# Patient Record
Sex: MALE | Race: WHITE | Employment: OTHER | URBAN - METROPOLITAN AREA
[De-identification: names, ages, dates, MRNs, and addresses within clinical notes are randomized per-mention and may not be internally consistent; named-entity substitution may affect disease eponyms.]

---

## 2017-01-18 ENCOUNTER — PATIENT OUTREACH (OUTPATIENT)
Dept: FAMILY MEDICINE CLINIC | Age: 77
End: 2017-01-18

## 2017-01-18 NOTE — PROGRESS NOTES
Patient is greater than 30 days post episode without further incidents. Patient has attended follow up appointment as scheduled with Provider. Will close episode and follow as needed.

## 2017-01-30 DIAGNOSIS — I65.09 VERTEBRAL ARTERY OCCLUSION, UNSPECIFIED LATERALITY: ICD-10-CM

## 2017-01-30 DIAGNOSIS — R76.8 RHEUMATOID FACTOR POSITIVE: ICD-10-CM

## 2017-01-30 DIAGNOSIS — E66.3 OVERWEIGHT: ICD-10-CM

## 2017-01-30 DIAGNOSIS — Z12.5 SCREENING FOR PROSTATE CANCER: ICD-10-CM

## 2017-01-30 DIAGNOSIS — D49.4 BLADDER TUMOR: ICD-10-CM

## 2017-01-30 RX ORDER — AMLODIPINE AND VALSARTAN 10; 320 MG/1; MG/1
TABLET ORAL
Qty: 30 TAB | Refills: 2 | Status: SHIPPED | OUTPATIENT
Start: 2017-01-30 | End: 2017-05-30 | Stop reason: SDUPTHER

## 2017-03-21 ENCOUNTER — TELEPHONE (OUTPATIENT)
Dept: NEUROLOGY | Age: 77
End: 2017-03-21

## 2017-03-21 NOTE — TELEPHONE ENCOUNTER
Dr. Joanna Stevenson office is going to fax over a paper asking if the pt can hold some of his medications. Once the fax is received they would like a phone call back.

## 2017-03-22 NOTE — TELEPHONE ENCOUNTER
Spoke with [facility], informed returned Faxed patient form and MD note 12/19/16.

## 2017-04-10 ENCOUNTER — HOSPITAL ENCOUNTER (OUTPATIENT)
Dept: CT IMAGING | Age: 77
Discharge: HOME | End: 2017-04-10
Attending: NURSE PRACTITIONER
Payer: MEDICARE

## 2017-04-10 DIAGNOSIS — I65.01 STENOSIS OF RIGHT VERTEBRAL ARTERY: ICD-10-CM

## 2017-04-10 LAB — CREAT BLD-MCNC: 1 MG/DL (ref 0.6–1.3)

## 2017-04-10 PROCEDURE — 70498 CT ANGIOGRAPHY NECK: CPT

## 2017-04-10 PROCEDURE — 74011636320 HC RX REV CODE- 636/320: Performed by: NURSE PRACTITIONER

## 2017-04-10 PROCEDURE — 74011000258 HC RX REV CODE- 258: Performed by: NURSE PRACTITIONER

## 2017-04-10 PROCEDURE — 82565 ASSAY OF CREATININE: CPT

## 2017-04-10 RX ORDER — SODIUM CHLORIDE 0.9 % (FLUSH) 0.9 %
10 SYRINGE (ML) INJECTION
Status: COMPLETED | OUTPATIENT
Start: 2017-04-10 | End: 2017-04-10

## 2017-04-10 RX ADMIN — SODIUM CHLORIDE 100 ML: 900 INJECTION, SOLUTION INTRAVENOUS at 12:37

## 2017-04-10 RX ADMIN — IOPAMIDOL 100 ML: 755 INJECTION, SOLUTION INTRAVENOUS at 12:30

## 2017-04-10 RX ADMIN — Medication 10 ML: at 12:37

## 2017-04-12 ENCOUNTER — TELEPHONE (OUTPATIENT)
Dept: NEUROLOGY | Age: 77
End: 2017-04-12

## 2017-04-12 NOTE — TELEPHONE ENCOUNTER
Gris patel/ Dr. Darshan Titus office [facility] checking status for clearance for pt.   Test was done at [facility] on Monday

## 2017-04-14 NOTE — TELEPHONE ENCOUNTER
Spoke with Carisa Fountain with [facility], informed of MD recommendation and clearance for surgery, with precautions of risk for stroke or TIA if aspirin or Plavix discontinued. Patient will have to resume antiplatelets once cleared by urology. Fax # for MD noted to Urology, [phone].

## 2017-04-21 ENCOUNTER — TELEPHONE (OUTPATIENT)
Dept: FAMILY MEDICINE CLINIC | Age: 77
End: 2017-04-21

## 2017-04-21 NOTE — TELEPHONE ENCOUNTER
Radha Richardson from [facility] is calling, Radha Richardson states that the patient was sent in two medications (prilosec and plavix) and they came back as two drugs that interact with each other. Radha Richardson wants to make sure that NP Alyssa Maddox wants to proceed with filling these medications. Naima Chaidez of NP [facility] absence until Monday and she states that this can wait until then.      Best call back # for Jonathan:[phone]

## 2017-04-23 DIAGNOSIS — K21.9 GASTROESOPHAGEAL REFLUX DISEASE WITHOUT ESOPHAGITIS: Primary | ICD-10-CM

## 2017-04-23 RX ORDER — LANSOPRAZOLE 30 MG/1
30 CAPSULE, DELAYED RELEASE ORAL
Qty: 30 CAP | Refills: 5 | Status: SHIPPED | OUTPATIENT
Start: 2017-04-23 | End: 2017-09-09 | Stop reason: SDUPTHER

## 2017-04-27 ENCOUNTER — TELEPHONE (OUTPATIENT)
Dept: NEUROLOGY | Age: 77
End: 2017-04-27

## 2017-05-09 ENCOUNTER — OFFICE VISIT (OUTPATIENT)
Dept: NEUROLOGY | Age: 77
End: 2017-05-09

## 2017-05-09 VITALS
DIASTOLIC BLOOD PRESSURE: 60 MMHG | OXYGEN SATURATION: 97 % | SYSTOLIC BLOOD PRESSURE: 140 MMHG | HEART RATE: 76 BPM | RESPIRATION RATE: 18 BRPM

## 2017-05-09 DIAGNOSIS — I65.02 OCCLUSION OF LEFT VERTEBRAL ARTERY: Primary | ICD-10-CM

## 2017-05-09 DIAGNOSIS — I69.30 LATE EFFECT OF STROKE: ICD-10-CM

## 2017-05-09 NOTE — PATIENT INSTRUCTIONS
[facility]   Statement to Patients  April 1, 2014      In an effort to ensure the large volume of patient prescription refills is processed in the most efficient and expeditious manner, we are asking our patients to assist us by calling your Pharmacy for all prescription refills, this will include also your  Mail Order Pharmacy. The pharmacy will contact our office electronically to continue the refill process. Please do not wait until the last minute to call your pharmacy. We need at least 48 hours (2days) to fill prescriptions. We also encourage you to call your pharmacy before going to  your prescription to make sure it is ready. With regard to controlled substance prescription refill requests (narcotic refills) that need to be picked up at our office, we ask your cooperation by providing us with at least 72 hours (3days) notice that you will need a refill. We will not refill narcotic prescription refill requests after 4:00pm on any weekday, Monday through Thursday, or after 2:00pm on Fridays, or on the weekends. We encourage everyone to explore another way of getting your prescription refill request processed using [employer], our patient web portal through our electronic medical record system. [employer] is an efficient and effective way to communicate your medication request directly to the office and  downloadable as an cooper on your smart phone . [employer] also features a review functionality that allows you to view your medication list as well as leave messages for your physician. Are you ready to get connected? If so please review the attatched instructions or speak to any of our staff to get you set up right away! Thank you so much for your cooperation. Should you have any questions please contact our Practice Administrator.     The Physicians and Staff,  [facility]     Thank you for choosing [facility] for your     care. You may receive a survey about your visit. We appreciate you taking time     to complete this survey as we use your feedback to improve our services. We     realize we are not perfect, but we strive to provide excellent care.

## 2017-05-09 NOTE — LETTER
5/9/2017 Patient:  Judah Evangelista YOB: 1940 Date of Visit: 5/9/2017 Dear Paramjit Spence MD 
[address] [facility] [address] [id number] VIA In Basket Natividad Ramirez NP 
[address] [phone] VIA In Basket : 
 
 
I was requested by Natividad Ramirez NP to evaluate Mr. Harrison Blackmon  for Chief Complaint Patient presents with  Neurologic Problem  
  follow up stroke Harles Old I am recommending the following:  
 
Nadir Otero was seen today for neurologic problem. Diagnoses and all orders for this visit: 
 
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 
 
 
 
---------------------------------------------------------------------------------------------------------------------- Below is my encounter: Chief Complaint Patient presents with  Neurologic Problem  
  follow up stroke MARIA T Blackmon is a 65-year-old gentleman whom I evaluated in October 2016 when he suffered an acute right cerebellar infarct. Evaluation also revealing a left vertebral artery occlusion. Repeat CTA in April 2017 showed no interval change. He is currently seeing urology for what sounds like a workup for possible bladder cancer. He is currently taking aspirin and Plavix for stroke prevention and heart attack prevention. Currently, his only complaints are fatigue and some wobbly gait in the morning. Review of Systems Constitutional: Positive for malaise/fatigue. All other systems reviewed and are negative. Past Medical History:  
Diagnosis Date  Calculus of kidney  Cerebral artery occlusion with cerebral infarction ([facility] [address]  Elevated cholesterol 5/26/2010  Fracture of right ankle 5/26/2010  
 HBP (high blood pressure) 5/26/2010  
 NIDDM (non-insulin dependent diabetes mellitus) 5/26/2010  Primary urethral papillary carcinoma ([facility] [address] 2014 Dr Gabriel Jimenez  Skin cancer of face  Ureteral calculus 5/26/2010 History reviewed. No pertinent family history. Social History Social History  Marital status:  Spouse name: N/A  
 Number of children: N/A  
 Years of education: N/A Occupational History  Not on file. Social History Main Topics  Smoking status: Former Smoker Packs/day: 1.00 Years: 30.00 Quit date: 5/27/1992  Smokeless tobacco: Never Used  Alcohol use No  
 Drug use: No  
 Sexual activity: Yes  
  Partners: Female Other Topics Concern  Not on file Social History Narrative Allergies Allergen Reactions  Benicar [Olmesartan] Unable to [employer]  Pcn [Penicillins] Unable to [employer]  Simvastatin Myalgia  Uniretic [Moexipril-Hydrochlorothiazide] Other (comments) GI upset Current Outpatient Prescriptions Medication Sig  
 glimepiride (AMARYL) 4 mg tablet TAKE 1 TAB BY MOUTH TWO (2) TIMES A DAY.  lansoprazole (PREVACID) 30 mg capsule Take 1 Cap by mouth Daily (before breakfast).  metFORMIN (GLUCOPHAGE) 1,000 mg tablet TAKE 1 TABLET TWICE A DAY WITH MEALS  clopidogrel (PLAVIX) 75 mg tab Take 1 Tab by mouth daily.  amLODIPine-valsartan (EXFORGE)  mg per tablet TAKE 1 TABLET BY MOUTH DAILY  ondansetron (ZOFRAN ODT) 4 mg disintegrating tablet Take 1 Tab by mouth every eight (8) hours as needed for Nausea.  aspirin delayed-release 81 mg tablet Take 1 Tab by mouth daily.  clotrimazole-betamethasone (LOTRISONE) topical cream APPLY TO AFFECTED AREA TWO (2) TIMES A DAY. (Patient taking differently: APPLY TO AFFECTED AREA TWO (2) TIMES A DAY. Applies to fingers)  colesevelam (WELCHOL) 625 mg tablet TAKE 3 TABS BY MOUTH TWO (2) TIMES DAILY (WITH MEALS).  diclofenac (VOLTAREN) 1 % topical gel Apply 4 g to affected area four (4) times daily. No current facility-administered medications for this visit. Neurologic Exam  
 
Mental Status WD/WN adult in NAD, normal grooming VSS 
A&O x 3 
 
 PERRL, nonicteric Face is asymmetric, tongue midline Speech is fluent and clear No limb ataxia. No abnl movements. 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 He is a stroke risk. He will need to remain on aspirin and Plavix from here on out. We discussed his urology workup. I have seen the request for medical clearance. I have no restriction on his surgery from a neurologic perspective. However, will need to defer to urology when is the appropriate time for him to discontinue his antiplatelet therapy since I am not familiar with the procedure being done. I discussed with the patient that there are risks with briefly discontinuing antiplatelet therapy such as slightly increased risk for stroke. He will need to discuss with his urologist the risks versus benefits of discontinuing antiplatelet therapy recently for his procedure. If he does pursue the procedure he will need to resume antiplatelets once cleared by urology postop. I would like to see him in 6 months as needed. Thank you for giving me the opportunity to assist in the care of Mr. Ike Simpson. If you have questions, please do not hesitate to contact me. Sincerely, [address] [facility], DO Neurologist 
Diplomate ASHLEY

## 2017-05-09 NOTE — PROGRESS NOTES
Chief Complaint   Patient presents with    Neurologic Problem     follow up stroke       HPI    Lisseth Atwood is a 65-year-old gentleman whom I evaluated in October 2016 when he suffered an acute right cerebellar infarct. Evaluation also revealing a left vertebral artery occlusion. Repeat CTA in April 2017 showed no interval change. He is currently seeing urology for what sounds like a workup for possible bladder cancer. He is currently taking aspirin and Plavix for stroke prevention and heart attack prevention. Currently, his only complaints are fatigue and some wobbly gait in the morning. Review of Systems   Constitutional: Positive for malaise/fatigue. All other systems reviewed and are negative. Past Medical History:   Diagnosis Date    Calculus of kidney     Cerebral artery occlusion with cerebral infarction ([facility] [address]     Elevated cholesterol 5/26/2010    Fracture of right ankle 5/26/2010    HBP (high blood pressure) 5/26/2010    NIDDM (non-insulin dependent diabetes mellitus) 5/26/2010    [facility]ral papillary [facility]) 2014    Dr Carla Dooley Skin cancer of face     Ureteral calculus 5/26/2010     History reviewed. No pertinent family history. Social History     Social History    Marital status:      Spouse name: N/A    Number of children: N/A    Years of education: N/A     Occupational History    Not on file.      Social History Main Topics    Smoking status: Former Smoker     Packs/day: 1.00     Years: 30.00     Quit date: 5/27/1992    Smokeless tobacco: Never Used    Alcohol use No    Drug use: No    Sexual activity: Yes     Partners: Female     Other Topics Concern    Not on file     Social History Narrative     Allergies   Allergen Reactions    Benicar [Olmesartan] Unable to Obtain    Pcn [Penicillins] Unable to Obtain    Simvastatin Myalgia    Uniretic [Moexipril-Hydrochlorothiazide] Other (comments)     GI upset         Current Outpatient Prescriptions Medication Sig    glimepiride (AMARYL) 4 mg tablet TAKE 1 TAB BY MOUTH TWO (2) TIMES A DAY.  lansoprazole (PREVACID) 30 mg capsule Take 1 Cap by mouth Daily (before breakfast).  metFORMIN (GLUCOPHAGE) 1,000 mg tablet TAKE 1 TABLET TWICE A DAY WITH MEALS    clopidogrel (PLAVIX) 75 mg tab Take 1 Tab by mouth daily.  amLODIPine-valsartan (EXFORGE)  mg per tablet TAKE 1 TABLET BY MOUTH DAILY    ondansetron (ZOFRAN ODT) 4 mg disintegrating tablet Take 1 Tab by mouth every eight (8) hours as needed for Nausea.  aspirin delayed-release 81 mg tablet Take 1 Tab by mouth daily.  clotrimazole-betamethasone (LOTRISONE) topical cream APPLY TO AFFECTED AREA TWO (2) TIMES A DAY. (Patient taking differently: APPLY TO AFFECTED AREA TWO (2) TIMES A DAY. Applies to fingers)    colesevelam (WELCHOL) 625 mg tablet TAKE 3 TABS BY MOUTH TWO (2) TIMES DAILY (WITH MEALS).  diclofenac (VOLTAREN) 1 % topical gel Apply 4 g to affected area four (4) times daily. No current facility-administered medications for this visit. Neurologic Exam     Mental Status        WD/WN adult in NAD, normal grooming  VSS  A&O x 3    PERRL, nonicteric  Face is asymmetric, tongue midline  Speech is fluent and clear  No limb ataxia. No abnl movements. Moving all extemities spontaneously and symmetric  Normal gait    CVS RRR  Lungs nonlabored  Skin is warm and dry         Visit Vitals    /60    Pulse 76    Resp 18    SpO2 97%       Assessment and Plan   Yimi Rosas was seen today for neurologic problem. Diagnoses and all orders for this visit:    Occlusion of left vertebral artery    Late effect of stroke      54-year-old gentleman with left vertebral artery occlusion. He is a stroke risk. He will need to remain on aspirin and Plavix from here on out. We discussed his urology workup. I have seen the request for medical clearance. I have no restriction on his surgery from a neurologic perspective.   However, will need to defer to urology when is the appropriate time for him to discontinue his antiplatelet therapy since I am not familiar with the procedure being done. I discussed with the patient that there are risks with briefly discontinuing antiplatelet therapy such as slightly increased risk for stroke. He will need to discuss with his urologist the risks versus benefits of discontinuing antiplatelet therapy recently for his procedure. If he does pursue the procedure he will need to resume antiplatelets once cleared by urology postop. I would like to see him in 6 months as needed. I reviewed and decided to continue the current medications.       [address] [place] [address], [facility] [address]  Diplomate ABPN

## 2017-05-09 NOTE — COMMUNICATION BODY
Chief Complaint   Patient presents with    Neurologic Problem     follow up stroke       HPI    Teena Michael is a 76-year-old gentleman whom I evaluated in October 2016 when he suffered an acute right cerebellar infarct. Evaluation also revealing a left vertebral artery occlusion. Repeat CTA in April 2017 showed no interval change. He is currently seeing urology for what sounds like a workup for possible bladder cancer. He is currently taking aspirin and Plavix for stroke prevention and heart attack prevention. Currently, his only complaints are fatigue and some wobbly gait in the morning. Review of Systems   Constitutional: Positive for malaise/fatigue. All other systems reviewed and are negative. Past Medical History:   Diagnosis Date    Calculus of kidney     Cerebral artery occlusion with cerebral infarction ([facility] [address]     Elevated cholesterol 5/26/2010    Fracture of right ankle 5/26/2010    HBP (high blood pressure) 5/26/2010    NIDDM (non-insulin dependent diabetes mellitus) 5/26/2010    Primary urethral papillary carcinoma [facility]) 2014    Dr Zoe Almazan Skin cancer of face     Ureteral calculus 5/26/2010     History reviewed. No pertinent family history. Social History     Social History    Marital status:      Spouse name: N/A    Number of children: N/A    Years of education: N/A     Occupational History    Not on file.      Social History Main Topics    Smoking status: Former Smoker     Packs/day: 1.00     Years: 30.00     Quit date: 5/27/1992    Smokeless tobacco: Never Used    Alcohol use No    Drug use: No    Sexual activity: Yes     Partners: Female     Other Topics Concern    Not on file     Social History Narrative     Allergies   Allergen Reactions    Benicar [Olmesartan] Unable to Obtain    Pcn [Penicillins] Unable to Obtain    Simvastatin Myalgia    Uniretic [Moexipril-Hydrochlorothiazide] Other (comments)     GI upset         Current Outpatient Prescriptions Medication Sig    glimepiride (AMARYL) 4 mg tablet TAKE 1 TAB BY MOUTH TWO (2) TIMES A DAY.  lansoprazole (PREVACID) 30 mg capsule Take 1 Cap by mouth Daily (before breakfast).  metFORMIN (GLUCOPHAGE) 1,000 mg tablet TAKE 1 TABLET TWICE A DAY WITH MEALS    clopidogrel (PLAVIX) 75 mg tab Take 1 Tab by mouth daily.  amLODIPine-valsartan (EXFORGE)  mg per tablet TAKE 1 TABLET BY MOUTH DAILY    ondansetron (ZOFRAN ODT) 4 mg disintegrating tablet Take 1 Tab by mouth every eight (8) hours as needed for Nausea.  aspirin delayed-release 81 mg tablet Take 1 Tab by mouth daily.  clotrimazole-betamethasone (LOTRISONE) topical cream APPLY TO AFFECTED AREA TWO (2) TIMES A DAY. (Patient taking differently: APPLY TO AFFECTED AREA TWO (2) TIMES A DAY. Applies to fingers)    colesevelam (WELCHOL) 625 mg tablet TAKE 3 TABS BY MOUTH TWO (2) TIMES DAILY (WITH MEALS).  diclofenac (VOLTAREN) 1 % topical gel Apply 4 g to affected area four (4) times daily. No current facility-administered medications for this visit. Neurologic Exam     Mental Status        WD/WN adult in NAD, normal grooming  VSS  A&O x 3    PERRL, nonicteric  Face is asymmetric, tongue midline  Speech is fluent and clear  No limb ataxia. No abnl movements. Moving all extemities spontaneously and symmetric  Normal gait    CVS RRR  Lungs nonlabored  Skin is warm and dry         Visit Vitals    /60    Pulse 76    Resp 18    SpO2 97%       Assessment and Plan   Greg Meade was seen today for neurologic problem. Diagnoses and all orders for this visit:    Occlusion of left vertebral artery    Late effect of stroke      16-year-old gentleman with left vertebral artery occlusion. He is a stroke risk. He will need to remain on aspirin and Plavix from here on out. We discussed his urology workup. I have seen the request for medical clearance. I have no restriction on his surgery from a neurologic perspective.   However, will need to defer to urology when is the appropriate time for him to discontinue his antiplatelet therapy since I am not familiar with the procedure being done. I discussed with the patient that there are risks with briefly discontinuing antiplatelet therapy such as slightly increased risk for stroke. He will need to discuss with his urologist the risks versus benefits of discontinuing antiplatelet therapy recently for his procedure. If he does pursue the procedure he will need to resume antiplatelets once cleared by urology postop. I would like to see him in 6 months as needed. I reviewed and decided to continue the current medications.       [address] [place] [address], [facility] [address]  Diplomate ABPN

## 2017-05-09 NOTE — MR AVS SNAPSHOT
Visit Information Date & Time Provider Department Dept. Phone Encounter #  
 5/9/2017  1:40 PM Clovis Costa, [facility] at [address] [id number] Follow-up Instructions Return in about 6 months (around 11/9/2017), or if symptoms worsen or fail to improve. Routing History Your Appointments 5/11/2017  8:30 AM  
ROUTINE CARE with Toi Kerr NP Formerly [facility] ([address]) Appt Note: FOLLOW UP VISIT -  [address] [facility] [address] [phone]  
[phone]  
  
   
 [address] [phone] Upcoming Health Maintenance Date Due DTaP/Tdap/Td series (1 - Tdap) 4/6/1960 Pneumococcal 65+ High/Highest Risk (2 of 2 - PPSV23) 10/13/2011 EYE EXAM RETINAL OR DILATED Q1 6/25/2015 MEDICARE YEARLY EXAM 4/3/2016 GLAUCOMA SCREENING Q2Y 6/25/2016 HEMOGLOBIN A1C Q6M 4/27/2017 MICROALBUMIN Q1 6/10/2017 FOOT EXAM Q1 6/16/2017 INFLUENZA AGE 9 TO ADULT 8/1/2017 LIPID PANEL Q1 11/1/2017 Allergies as of 5/9/2017  Review Complete On: 5/9/2017 By: Vik Burden LPN Severity Noted Reaction Type Reactions Benicar [Olmesartan]  05/26/2010    Unable to Obtain Pcn [Penicillins]  05/26/2010    Unable to Obtain Simvastatin  05/26/2010    Myalgia Uniretic [Moexipril-hydrochlorothiazide]  05/26/2010    Other (comments) GI upset Current Immunizations  Reviewed on 10/31/2016 Name Date Influenza High Dose Vaccine PF 10/27/2016, 10/28/2015, 12/4/2014 Influenza Vaccine 12/12/2012 10:42 AM  
 Influenza Vaccine PF 11/11/2013 Influenza Vaccine Split 11/8/2011, 10/26/2010 Pneumococcal Vaccine (Unspecified Type) 10/13/2006 Varicella Virus Vaccine Live 2/27/2008 Zoster Vaccine, Live 10/31/2006 Not reviewed this visit You Were Diagnosed With   
  
 Codes Comments Occlusion of left vertebral artery    -  Primary ICD-10-CM: I65.02 
ICD-9-CM: 433.20 Late effect of stroke     ICD-10-CM: I69.30 ICD-9-CM: 438.9 Vitals BP Pulse Resp SpO2 Smoking Status 140/60 76 18 97% Former Smoker Preferred Pharmacy Pharmacy Name Phone [id number] [address] [facility] [address] [phone] Your Updated Medication List  
  
   
This list is accurate as of: 5/9/17  1:51 PM.  Always use your most recent med list. amLODIPine-valsartan  mg per tablet Commonly known as:  EXFORGE  
TAKE 1 TABLET BY MOUTH DAILY  
  
 aspirin delayed-release 81 mg tablet Take 1 Tab by mouth daily. clopidogrel 75 mg Tab Commonly known as:  PLAVIX Take 1 Tab by mouth daily. clotrimazole-betamethasone topical cream  
Commonly known as:  LOTRISONE  
APPLY TO AFFECTED AREA TWO (2) TIMES A DAY. colesevelam 625 mg tablet Commonly known as:  WELCHOL  
TAKE 3 TABS BY MOUTH TWO (2) TIMES DAILY (WITH MEALS). diclofenac 1 % Gel Commonly known as:  VOLTAREN Apply 4 g to affected area four (4) times daily. glimepiride 4 mg tablet Commonly known as:  AMARYL  
TAKE 1 TAB BY MOUTH TWO (2) TIMES A DAY. lansoprazole 30 mg capsule Commonly known as:  PREVACID Take 1 Cap by mouth Daily (before breakfast). metFORMIN 1,000 mg tablet Commonly known as:  GLUCOPHAGE  
TAKE 1 TABLET TWICE A DAY WITH MEALS  
  
 ondansetron 4 mg disintegrating tablet Commonly known as:  ZOFRAN ODT Take 1 Tab by mouth every eight (8) hours as needed for Nausea. Follow-up Instructions Return in about 6 months (around 11/9/2017), or if symptoms worsen or fail to improve. Patient Instructions PRESCRIPTION REFILL POLICY [facility] Statement to Patients April 1, 2014 In an effort to ensure the large volume of patient prescription refills is processed in the most efficient and expeditious manner, we are asking our patients to assist us by calling your Pharmacy for all prescription refills, this will include also your  Mail Order Pharmacy. The pharmacy will contact our office electronically to continue the refill process. Please do not wait until the last minute to call your pharmacy. We need at least 48 hours (2days) to fill prescriptions. We also encourage you to call your pharmacy before going to  your prescription to make sure it is ready. With regard to controlled substance prescription refill requests (narcotic refills) that need to be picked up at our office, we ask your cooperation by providing us with at least 72 hours (3days) notice that you will need a refill. We will not refill narcotic prescription refill requests after 4:00pm on any weekday, Monday through Thursday, or after 2:00pm on Fridays, or on the weekends. We encourage everyone to explore another way of getting your prescription refill request processed using [employer], our patient web portal through our electronic medical record system. [employer] is an efficient and effective way to communicate your medication request directly to the office and  downloadable as an cooper on your smart phone . [employer] also features a review functionality that allows you to view your medication list as well as leave messages for your physician. Are you ready to get connected? If so please review the attatched instructions or speak to any of our staff to get you set up right away! Thank you so much for your cooperation. Should you have any questions please contact our Practice Administrator. The Physicians and Staff,  [facility] Thank you for choosing Climmie Benjamin and [facility] for your  
 
care. You may receive a survey about your visit. We appreciate you taking time to complete this survey as we use your feedback to improve our services. We  
 
realize we are not perfect, but we strive to provide excellent care. Introducing [facility] & HEALTH SERVICES! Dear Terrence Davis: Thank you for requesting a [employer] account. Our records indicate that you already have an active [employer] account. You can access your account anytime at [email or web address] Did you know that you can access your hospital and ER discharge instructions at any time in [employer] You can also review all of your test results from your hospital stay or ER visit. Additional Information If you have questions, please visit the Frequently Asked Questions section of the [employer] website at [email or web address] Remember, [employer] is NOT to be used for urgent needs. For medical emergencies, dial 911. Now available from your iPhone and Android! Please provide this summary of care documentation to your next provider. Your primary care clinician is listed as Alexandre Rajput. If you have any questions after today's visit, please call [phone].

## 2017-05-11 ENCOUNTER — OFFICE VISIT (OUTPATIENT)
Dept: FAMILY MEDICINE CLINIC | Age: 77
End: 2017-05-11

## 2017-05-11 ENCOUNTER — HOSPITAL ENCOUNTER (OUTPATIENT)
Dept: LAB | Age: 77
Discharge: HOME | End: 2017-05-11
Payer: MEDICARE

## 2017-05-11 VITALS
DIASTOLIC BLOOD PRESSURE: 64 MMHG | HEART RATE: 68 BPM | TEMPERATURE: 96.8 F | RESPIRATION RATE: 18 BRPM | HEIGHT: 69 IN | BODY MASS INDEX: 31.04 KG/M2 | SYSTOLIC BLOOD PRESSURE: 139 MMHG | OXYGEN SATURATION: 98 % | WEIGHT: 209.6 LBS

## 2017-05-11 DIAGNOSIS — Z23 ENCOUNTER FOR IMMUNIZATION: ICD-10-CM

## 2017-05-11 DIAGNOSIS — E11.9 TYPE 2 DIABETES MELLITUS WITHOUT COMPLICATION, WITHOUT LONG-TERM CURRENT USE OF INSULIN (HCC): ICD-10-CM

## 2017-05-11 DIAGNOSIS — Z00.00 MEDICARE ANNUAL WELLNESS VISIT, SUBSEQUENT: Primary | ICD-10-CM

## 2017-05-11 DIAGNOSIS — I63.9 ACUTE CVA (CEREBROVASCULAR ACCIDENT) (HCC): ICD-10-CM

## 2017-05-11 DIAGNOSIS — E78.00 HYPERCHOLESTEROLEMIA: ICD-10-CM

## 2017-05-11 DIAGNOSIS — I10 BENIGN ESSENTIAL HTN: ICD-10-CM

## 2017-05-11 PROBLEM — Z71.89 ADVANCED CARE PLANNING/COUNSELING DISCUSSION: Status: ACTIVE | Noted: 2017-05-11

## 2017-05-11 PROCEDURE — 80061 LIPID PANEL: CPT

## 2017-05-11 PROCEDURE — 80053 COMPREHEN METABOLIC PANEL: CPT

## 2017-05-11 PROCEDURE — 83036 HEMOGLOBIN GLYCOSYLATED A1C: CPT

## 2017-05-11 PROCEDURE — 82043 UR ALBUMIN QUANTITATIVE: CPT

## 2017-05-11 NOTE — MR AVS SNAPSHOT
Visit Information Date & Time Provider Department Dept. Phone Encounter #  
 5/11/2017  8:30 AM Eric Blevins  [facility] [address] [phone] [id number] Upcoming Health Maintenance Date Due DTaP/Tdap/Td series (1 - Tdap) 4/6/1960 Pneumococcal 65+ High/Highest Risk (2 of 2 - PPSV23) 10/13/2011 EYE EXAM RETINAL OR DILATED Q1 6/25/2015 MEDICARE YEARLY EXAM 4/3/2016 GLAUCOMA SCREENING Q2Y 6/25/2016 HEMOGLOBIN A1C Q6M 4/27/2017 MICROALBUMIN Q1 6/10/2017 FOOT EXAM Q1 6/16/2017 INFLUENZA AGE 9 TO ADULT 8/1/2017 LIPID PANEL Q1 11/1/2017 Allergies as of 5/11/2017  Review Complete On: 5/11/2017 By: Eric Blevins NP Severity Noted Reaction Type Reactions Benicar [Olmesartan]  05/26/2010    Unable to Obtain Pcn [Penicillins]  05/26/2010    Unable to Obtain Simvastatin  05/26/2010    Myalgia Uniretic [Moexipril-hydrochlorothiazide]  05/26/2010    Other (comments) GI upset Current Immunizations  Reviewed on 10/31/2016 Name Date Influenza High Dose Vaccine PF 10/27/2016, 10/28/2015, 12/4/2014 Influenza Vaccine 12/12/2012 10:42 AM  
 Influenza Vaccine PF 11/11/2013 Influenza Vaccine Split 11/8/2011, 10/26/2010 Pneumococcal Vaccine (Unspecified Type) 10/13/2006 Varicella Virus Vaccine Live 2/27/2008 Zoster Vaccine, Live 10/31/2006 Not reviewed this visit You Were Diagnosed With   
  
 Codes Comments Type 2 diabetes mellitus without complication, without long-term current use of insulin (HCC)    -  Primary ICD-10-CM: E11.9 ICD-9-CM: 250.00 Benign essential HTN     ICD-10-CM: I10 
ICD-9-CM: 401.1 Hypercholesterolemia     ICD-10-CM: E78.00 ICD-9-CM: 272.0 Encounter for immunization     ICD-10-CM: M37 ICD-9-CM: V03.89 Vitals BP Pulse Temp Resp Height(growth percentile) Weight(growth percentile)  139/64 (BP 1 Location: Left arm, BP Patient Position: Sitting) 68 96.8 °F (36 °C) (Oral) 18 5' 9\" (1.753 m) 209 lb 9.6 oz (95.1 kg) SpO2 BMI Smoking Status 98% 30.95 kg/m2 Former Smoker BMI and BSA Data Body Mass Index Body Surface Area 30.95 kg/m 2 2.15 m 2 Preferred Pharmacy Pharmacy Name Phone [id number]- [address] [facility] [address] [phone] Your Updated Medication List  
  
   
This list is accurate as of: 17  8:57 AM.  Always use your most recent med list. amLODIPine-valsartan  mg per tablet Commonly known as:  EXFORGE  
TAKE 1 TABLET BY MOUTH DAILY  
  
 aspirin delayed-release 81 mg tablet Take 1 Tab by mouth daily. clopidogrel 75 mg Tab Commonly known as:  PLAVIX Take 1 Tab by mouth daily. clotrimazole-betamethasone topical cream  
Commonly known as:  LOTRISONE  
APPLY TO AFFECTED AREA TWO (2) TIMES A DAY. colesevelam 625 mg tablet Commonly known as:  WELCHOL  
TAKE 3 TABS BY MOUTH TWO (2) TIMES DAILY (WITH MEALS). diclofenac 1 % Gel Commonly known as:  VOLTAREN Apply 4 g to affected area four (4) times daily. glimepiride 4 mg tablet Commonly known as:  AMARYL  
TAKE 1 TAB BY MOUTH TWO (2) TIMES A DAY. lansoprazole 30 mg capsule Commonly known as:  PREVACID Take 1 Cap by mouth Daily (before breakfast). metFORMIN 1,000 mg tablet Commonly known as:  GLUCOPHAGE  
TAKE 1 TABLET TWICE A DAY WITH MEALS  
  
 ondansetron 4 mg disintegrating tablet Commonly known as:  ZOFRAN ODT Take 1 Tab by mouth every eight (8) hours as needed for Nausea. pneumococcal 13 alejandrina conj dip 0.5 mL Syrg injection Commonly known as:  PREVNAR-13  
0.5 mL by IntraMUSCular route once for 1 dose. Prescriptions Printed Refills  
 pneumococcal 13 alejandrina conj dip (PREVNAR-13) 0.5 mL syrg injection 0 Si.5 mL by IntraMUSCular route once for 1 dose. Class: Print Route: IntraMUSCular We Performed the Following HEMOGLOBIN A1C WITH EAG [02140 CPT(R)] LIPID PANEL [10990 CPT(R)] METABOLIC PANEL, COMPREHENSIVE [72543 CPT(R)] MICROALBUMIN, UR, RAND W/ MICROALBUMIN/CREA RATIO [id number] CPT(R)] TX COLLECTION VENOUS BLOOD,VENIPUNCTURE [id number] CPT(R)] TX HANDLG&/OR CONVEY OF SPEC FOR TR OFFICE TO LAB [07463 CPT(R)] Introducing [facility] & HEALTH SERVICES! Dear Teo Cook: Thank you for requesting a [employer] account. Our records indicate that you already have an active [employer] account. You can access your account anytime at [email or web address] Did you know that you can access your hospital and ER discharge instructions at any time in [employer]? You can also review all of your test results from your hospital stay or ER visit. Additional Information If you have questions, please visit the Frequently Asked Questions section of the [employer] website at [email or web address] Remember, [employer] is NOT to be used for urgent needs. For medical emergencies, dial 911. Now available from your iPhone and Android! Please provide this summary of care documentation to your next provider. Your primary care clinician is listed as Esthela Rutledge. If you have any questions after today's visit, please call [phone].

## 2017-05-12 LAB
ALBUMIN SERPL-MCNC: 4.6 G/DL (ref 3.5–4.8)
ALBUMIN/CREAT UR: <4.3 MG/G CREAT (ref 0–30)
ALBUMIN/GLOB SERPL: 2.2 {RATIO} (ref 1.2–2.2)
ALP SERPL-CCNC: 71 IU/L (ref 39–117)
ALT SERPL-CCNC: 30 IU/L (ref 0–44)
AST SERPL-CCNC: 25 IU/L (ref 0–40)
BILIRUB SERPL-MCNC: 0.4 MG/DL (ref 0–1.2)
BUN SERPL-MCNC: 24 MG/DL (ref 8–27)
BUN/CREAT SERPL: 23 (ref 10–24)
CALCIUM SERPL-MCNC: 9.3 MG/DL (ref 8.6–10.2)
CHLORIDE SERPL-SCNC: 103 MMOL/L (ref 96–106)
CHOLEST SERPL-MCNC: 116 MG/DL (ref 100–199)
CO2 SERPL-SCNC: 24 MMOL/L (ref 18–29)
CREAT SERPL-MCNC: 1.04 MG/DL (ref 0.76–1.27)
CREAT UR-MCNC: 69.6 MG/DL
EST. AVERAGE GLUCOSE BLD GHB EST-MCNC: 148 MG/DL
GLOBULIN SER CALC-MCNC: 2.1 G/DL (ref 1.5–4.5)
GLUCOSE SERPL-MCNC: 110 MG/DL (ref 65–99)
HBA1C MFR BLD: 6.8 % (ref 4.8–5.6)
HDLC SERPL-MCNC: 44 MG/DL
INTERPRETATION, 910389: NORMAL
LDLC SERPL CALC-MCNC: 57 MG/DL (ref 0–99)
MICROALBUMIN UR-MCNC: <3 UG/ML
POTASSIUM SERPL-SCNC: 4.9 MMOL/L (ref 3.5–5.2)
PROT SERPL-MCNC: 6.7 G/DL (ref 6–8.5)
SODIUM SERPL-SCNC: 141 MMOL/L (ref 134–144)
TRIGL SERPL-MCNC: 76 MG/DL (ref 0–149)
VLDLC SERPL CALC-MCNC: 15 MG/DL (ref 5–40)

## 2017-05-30 DIAGNOSIS — R76.8 RHEUMATOID FACTOR POSITIVE: ICD-10-CM

## 2017-05-30 DIAGNOSIS — E66.3 OVERWEIGHT: ICD-10-CM

## 2017-05-30 DIAGNOSIS — Z12.5 SCREENING FOR PROSTATE CANCER: ICD-10-CM

## 2017-05-30 DIAGNOSIS — I65.09 VERTEBRAL ARTERY OCCLUSION, UNSPECIFIED LATERALITY: ICD-10-CM

## 2017-05-30 DIAGNOSIS — E11.9 TYPE 2 DIABETES MELLITUS WITHOUT COMPLICATION, WITHOUT LONG-TERM CURRENT USE OF INSULIN (HCC): ICD-10-CM

## 2017-05-30 DIAGNOSIS — D49.4 BLADDER TUMOR: ICD-10-CM

## 2017-05-30 RX ORDER — COLESEVELAM HYDROCHLORIDE 625 MG/1
TABLET, FILM COATED ORAL
Qty: 180 TAB | Refills: 2 | Status: SHIPPED | OUTPATIENT
Start: 2017-05-30 | End: 2017-08-26 | Stop reason: SDUPTHER

## 2017-05-30 RX ORDER — AMLODIPINE AND VALSARTAN 10; 320 MG/1; MG/1
TABLET ORAL
Qty: 30 TAB | Refills: 2 | Status: SHIPPED | OUTPATIENT
Start: 2017-05-30 | End: 2017-08-26 | Stop reason: SDUPTHER

## 2017-06-21 ENCOUNTER — TELEPHONE (OUTPATIENT)
Dept: FAMILY MEDICINE CLINIC | Age: 77
End: 2017-06-21

## 2017-06-21 NOTE — TELEPHONE ENCOUNTER
Contact # is [phone]    Trevon Sharma, with Dr Rubi Persaud office, called stating they received the notes from patient's May appointment, however, they need for a physician to sign of on notes stating that they reviewed and approved of LORY Mcdonough's notes. They are trying to get patient [facility].  Please fax over signed notes to [phone]

## 2017-08-04 DIAGNOSIS — E11.9 TYPE 2 DIABETES MELLITUS WITHOUT COMPLICATION, WITHOUT LONG-TERM CURRENT USE OF INSULIN (HCC): ICD-10-CM

## 2017-08-08 RX ORDER — GLIMEPIRIDE 4 MG/1
TABLET ORAL
Qty: 180 TAB | Refills: 0 | Status: SHIPPED | OUTPATIENT
Start: 2017-08-08 | End: 2017-11-02 | Stop reason: SDUPTHER

## 2017-08-26 DIAGNOSIS — E66.3 OVERWEIGHT: ICD-10-CM

## 2017-08-26 DIAGNOSIS — D49.4 BLADDER TUMOR: ICD-10-CM

## 2017-08-26 DIAGNOSIS — Z12.5 SCREENING FOR PROSTATE CANCER: ICD-10-CM

## 2017-08-26 DIAGNOSIS — R76.8 RHEUMATOID FACTOR POSITIVE: ICD-10-CM

## 2017-08-26 DIAGNOSIS — E11.9 TYPE 2 DIABETES MELLITUS WITHOUT COMPLICATION, WITHOUT LONG-TERM CURRENT USE OF INSULIN (HCC): ICD-10-CM

## 2017-08-26 DIAGNOSIS — I65.09 VERTEBRAL ARTERY OCCLUSION, UNSPECIFIED LATERALITY: ICD-10-CM

## 2017-08-27 RX ORDER — AMLODIPINE AND VALSARTAN 10; 320 MG/1; MG/1
TABLET ORAL
Qty: 30 TAB | Refills: 2 | Status: SHIPPED | OUTPATIENT
Start: 2017-08-27 | End: 2017-11-18 | Stop reason: SDUPTHER

## 2017-08-27 RX ORDER — COLESEVELAM HYDROCHLORIDE 625 MG/1
TABLET, FILM COATED ORAL
Qty: 180 TAB | Refills: 2 | Status: SHIPPED | OUTPATIENT
Start: 2017-08-27 | End: 2017-11-18 | Stop reason: SDUPTHER

## 2017-09-08 DIAGNOSIS — Z86.73 HISTORY OF RIGHT MCA STROKE: ICD-10-CM

## 2017-09-08 DIAGNOSIS — I65.01 STENOSIS OF RIGHT VERTEBRAL ARTERY: Primary | ICD-10-CM

## 2017-09-14 ENCOUNTER — HOSPITAL ENCOUNTER (OUTPATIENT)
Dept: CT IMAGING | Age: 77
Discharge: HOME | End: 2017-09-14
Attending: RADIOLOGY
Payer: MEDICARE

## 2017-09-14 DIAGNOSIS — I65.01 STENOSIS OF RIGHT VERTEBRAL ARTERY: ICD-10-CM

## 2017-09-14 DIAGNOSIS — Z86.73 HISTORY OF RIGHT MCA STROKE: ICD-10-CM

## 2017-09-14 LAB — CREAT BLD-MCNC: 1 MG/DL (ref 0.6–1.3)

## 2017-09-14 PROCEDURE — 70496 CT ANGIOGRAPHY HEAD: CPT

## 2017-09-14 PROCEDURE — 70498 CT ANGIOGRAPHY NECK: CPT

## 2017-09-14 PROCEDURE — 82565 ASSAY OF CREATININE: CPT

## 2017-09-14 PROCEDURE — 74011636320 HC RX REV CODE- 636/320: Performed by: RADIOLOGY

## 2017-09-14 PROCEDURE — 74011000258 HC RX REV CODE- 258: Performed by: RADIOLOGY

## 2017-09-14 RX ORDER — SODIUM CHLORIDE 0.9 % (FLUSH) 0.9 %
10 SYRINGE (ML) INJECTION
Status: COMPLETED | OUTPATIENT
Start: 2017-09-14 | End: 2017-09-14

## 2017-09-14 RX ADMIN — Medication 10 ML: at 08:50

## 2017-09-14 RX ADMIN — IOPAMIDOL 100 ML: 755 INJECTION, SOLUTION INTRAVENOUS at 08:50

## 2017-09-14 RX ADMIN — SODIUM CHLORIDE 100 ML: 900 INJECTION, SOLUTION INTRAVENOUS at 08:50

## 2017-09-21 ENCOUNTER — HOSPITAL ENCOUNTER (OUTPATIENT)
Dept: LAB | Age: 77
Discharge: HOME | End: 2017-09-21
Payer: MEDICARE

## 2017-09-21 ENCOUNTER — OFFICE VISIT (OUTPATIENT)
Dept: NEUROSURGERY | Age: 77
End: 2017-09-21

## 2017-09-21 VITALS
OXYGEN SATURATION: 18 % | TEMPERATURE: 97.5 F | SYSTOLIC BLOOD PRESSURE: 144 MMHG | HEART RATE: 74 BPM | DIASTOLIC BLOOD PRESSURE: 80 MMHG

## 2017-09-21 DIAGNOSIS — I63.541 CEREBRAL INFARCTION INVOLVING RIGHT CEREBELLAR ARTERY (HCC): Primary | ICD-10-CM

## 2017-09-21 DIAGNOSIS — R42 DIZZINESS, NONSPECIFIC: ICD-10-CM

## 2017-09-21 DIAGNOSIS — I65.02 OCCLUSION OF LEFT VERTEBRAL ARTERY: ICD-10-CM

## 2017-09-21 DIAGNOSIS — I65.01 STENOSIS OF RIGHT VERTEBRAL ARTERY: ICD-10-CM

## 2017-09-21 LAB — P2Y12 PLT RESPONSE,PPPR: 187 PRU (ref 194–418)

## 2017-09-21 PROCEDURE — 85576 BLOOD PLATELET AGGREGATION: CPT | Performed by: NURSE PRACTITIONER

## 2017-09-21 NOTE — PROGRESS NOTES
Neurointerventional Surgery  Ambulatory Progress Note      Patient: Gilbert Torre MRN: [id number]  SSN: [id number]    YOB: 1940  Age: 65 y.o. Sex: male      History of Present Illness:      66 yo male returns to clinic today for delayed follow up for his Right vertebral artery stenosis, that is dominant and his Left vertebral artery partial occlusion. History of Right cerebellar and Left frontal infarction, 2016. Today patient endorses multiple co morbidities including Diabetes and significant related factors including DPN  (feet), gastroparesis, nausea, and GERD. Pt continues to have fluctuating dizziness that he tries to control with slow position changes. DX with bladder CA and well controlled HTN. He is compliant with daily ASA and Plavix. Last Plavix assay was 193. Non  Smoker. He has recently  Obtained a CTA head and neck and is here today to review results. 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 Objective:     Current Outpatient Prescriptions   Medication Sig Dispense Refill    lansoprazole (PREVACID) 30 mg capsule Take 1 Cap by mouth Daily (before breakfast). Office visit due.  30 Cap 2    amLODIPine-valsartan (EXFORGE)  mg per tablet TAKE 1 TABLET EVERY DAY 30 Tab 2    WELCHOL 625 mg tablet TAKE 3 TABS BY MOUTH TWO (2) TIMES DAILY (WITH MEALS). 180 Tab 2    glimepiride (AMARYL) 4 mg tablet TAKE 1 TAB BY MOUTH TWO (2) TIMES A DAY. 180 Tab 0    clopidogrel (PLAVIX) 75 mg tab TAKE 1 TABLET EVERY DAY 30 Tab 5    metFORMIN (GLUCOPHAGE) 1,000 mg tablet Take 1 Tab by mouth two (2) times daily (with meals). 60 Tab 5    ondansetron (ZOFRAN ODT) 4 mg disintegrating tablet Take 1 Tab by mouth every eight (8) hours as needed for Nausea. 30 Tab 0    aspirin delayed-release 81 mg tablet Take 1 Tab by mouth daily. 30 Tab 0    clotrimazole-betamethasone (LOTRISONE) topical cream APPLY TO AFFECTED AREA TWO (2) TIMES A DAY. (Patient taking differently: APPLY TO AFFECTED AREA TWO (2) TIMES A DAY. Applies to fingers) 60 g 0    diclofenac (VOLTAREN) 1 % topical gel Apply 4 g to affected area four (4) times daily. 100 g 0        Visit Vitals    /80 (BP 1 Location: Right arm, BP Patient Position: Sitting)    Pulse 74    Temp 97.5 °F (36.4 °C) (Oral)    SpO2 (!) 18%       Allergies   Allergen Reactions    Benicar [Olmesartan] Unable to Obtain    Pcn [Penicillins] Unable to Obtain    Simvastatin Myalgia    Uniretic [Moexipril-Hydrochlorothiazide] Other (comments)     GI upset        Physical Exam:  General: NAD  Lungs: clear to auscultation bilaterally  Heart: regular rate and rhythm, S1, S2 normal, no murmur, click, rub or gallop  Abdomen: deferred  Extremities: extremities normal, atraumatic, no cyanosis or edema  Pulses: 2+ and symmetric    Neurologic Exam:  Mental Status:  Alert and oriented x 4. Appropriate affect, mood and behavior. Language:    Normal fluency, repetition, comprehension and naming. Cranial Nerves:   Pupils equal, round and reactive to light. Visual fields full to confrontation. Extraocular movements intact. Facial sensation intact V1 - V3. Full facial strength, no asymmetry. Hearing intact bilaterally. No dysarthria.  Tongue protrudes to midline, palate elevates symmetrically. Shoulder shrug 5/5 bilaterally. Motor:    No pronator drift. Bulk and tone normal.      5/5 power in all extremities proximally and distally. 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 Sensation:    Decreased sensation bilateral feet    Coordination & Gait: Romberg positive, difficult tandem gait. Labs:  Lab Results   Component Value Date/Time    Sodium 141 05/11/2017 09:01 AM    Potassium 4.9 05/11/2017 09:01 AM    Chloride 103 05/11/2017 09:01 AM    CO2 24 05/11/2017 09:01 AM    Anion gap 9 10/31/2016 12:10 PM    Glucose 110 05/11/2017 09:01 AM    BUN 24 05/11/2017 09:01 AM    Creatinine 1.04 05/11/2017 09:01 AM    BUN/Creatinine ratio 23 05/11/2017 09:01 AM    GFR est AA 79 05/11/2017 09:01 AM    GFR est non-AA 68 05/11/2017 09:01 AM    Calcium 9.3 05/11/2017 09:01 AM     Lab Results   Component Value Date/Time    WBC 8.5 11/01/2016 03:47 AM    WBC 9.1 07/02/2012 08:54 AM    HGB 12.6 11/01/2016 03:47 AM    HCT 38.5 11/01/2016 03:47 AM    PLATELET 376 06/13/2101 03:47 AM    MCV 90.4 11/01/2016 03:47 AM     Lab Results   Component Value Date/Time    MCH 29.6 11/01/2016 03:47 AM    MCHC 32.7 11/01/2016 03:47 AM    BASOPHILS 0 10/31/2016 12:10 PM    ABS. LYMPHOCYTES 0.8 10/31/2016 12:10 PM    ABS. MONOCYTES 0.4 10/31/2016 12:10 PM    ABS. EOSINOPHILS 0.0 10/31/2016 12:10 PM    ABS. BASOPHILS 0.0 10/31/2016 12:10 PM     IMAGING:    CTA head neck 9/14/2017:    IMPRESSION:     1. No significant change in mild atherosclerotic plaque involving the bilateral  internal carotid artery origins and cavernous carotid artery segments. 2. Patent, dominant right vertebral artery though there is moderate stenosis at  its origin. Partial occlusion of the proximal to mid cervical left vertebral  artery, unchanged.   3. Unchanged course and borderline dehiscence of the proximal petrous internal  carotid artery segments as above.       Assessment/Plan:       ICD-10-CM ICD-9-CM    1. 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 Occlusion of left vertebral artery I65.02 433.20 MRI BRAIN W WO CONT   3. Stenosis of right vertebral artery I65.01 433.20 MRI BRAIN W WO CONT   4. Dizziness, nonspecific R42 780.4         - will order MRI head w and wo for ongoing problematic dizziness , pertinent hx of small cerebellar infarct and bladder CA, probable GI sx related to diabetic complications of gastroparesis     - P2Y12 reordered, last Assay 193 (borderline therapeutic)     - will determine plan of care once results obtained     - recent CTA head and neck appears stable as compared to last year.     Chai Davis, NP

## 2017-09-21 NOTE — MR AVS SNAPSHOT
Visit Information Date & Time Provider Department Dept. Phone Encounter #  
 9/21/2017 10:30 AM Richardalejandrina [facility] Neurointerventional Surgery [phone] [id number] Follow-up Instructions Return in about 1 year (around 9/21/2018), or TBD, for MRI now. Upcoming Health Maintenance Date Due  
 EYE EXAM RETINAL OR DILATED Q1 6/25/2015 FOOT EXAM Q1 6/16/2017 INFLUENZA AGE 9 TO ADULT 8/1/2017 HEMOGLOBIN A1C Q6M 11/11/2017 MICROALBUMIN Q1 5/11/2018 LIPID PANEL Q1 5/11/2018 MEDICARE YEARLY EXAM 5/12/2018 GLAUCOMA SCREENING Q2Y 1/24/2019 DTaP/Tdap/Td series (2 - Td) 5/11/2027 Allergies as of 9/21/2017  Review Complete On: 9/21/2017 By: Adolfo Kevin CNA Severity Noted Reaction Type Reactions Benicar [Olmesartan]  05/26/2010    Unable to Obtain Pcn [Penicillins]  05/26/2010    Unable to Obtain Simvastatin  05/26/2010    Myalgia Uniretic [Moexipril-hydrochlorothiazide]  05/26/2010    Other (comments) GI upset Current Immunizations  Reviewed on 10/31/2016 Name Date Influenza High Dose Vaccine PF 10/27/2016, 10/28/2015, 12/4/2014 Influenza Vaccine 12/12/2012 10:42 AM  
 Influenza Vaccine PF 11/11/2013 Influenza Vaccine Split 11/8/2011, 10/26/2010 Pneumococcal Conjugate (PCV-13) 7/25/2017 Varicella Virus Vaccine Live 2/27/2008 ZZZ-RETIRED (DO NOT USE) Pneumococcal Vaccine (Unspecified Type) 10/13/2006 Zoster Vaccine, Live 10/31/2006 Not reviewed this visit You Were Diagnosed With   
  
 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 
ICD-9-CM: 433.20 Stenosis of right vertebral artery     ICD-10-CM: I65.01 
ICD-9-CM: 433.20 Dizziness, nonspecific     ICD-10-CM: C67 ICD-9-CM: 780.4 Vitals BP Pulse Temp SpO2 Smoking Status 144/80 (BP 1 Location: Right arm, BP Patient Position: Sitting) 74 97.5 °F (36.4 °C) (Oral) (!) 18% Former Smoker Preferred Pharmacy Pharmacy Name Phone [id number] [facility] [address] [phone] Your Updated Medication List  
  
   
This list is accurate as of: 9/21/17 10:55 AM.  Always use your most recent med list. amLODIPine-valsartan  mg per tablet Commonly known as:  EXFORGE  
TAKE 1 TABLET EVERY DAY  
  
 aspirin delayed-release 81 mg tablet Take 1 Tab by mouth daily. clopidogrel 75 mg Tab Commonly known as:  PLAVIX TAKE 1 TABLET EVERY DAY  
  
 clotrimazole-betamethasone topical cream  
Commonly known as:  LOTRISONE  
APPLY TO AFFECTED AREA TWO (2) TIMES A DAY. diclofenac 1 % Gel Commonly known as:  VOLTAREN Apply 4 g to affected area four (4) times daily. glimepiride 4 mg tablet Commonly known as:  AMARYL  
TAKE 1 TAB BY MOUTH TWO (2) TIMES A DAY. lansoprazole 30 mg capsule Commonly known as:  PREVACID Take 1 Cap by mouth Daily (before breakfast). Office visit due.  
  
 metFORMIN 1,000 mg tablet Commonly known as:  GLUCOPHAGE Take 1 Tab by mouth two (2) times daily (with meals). ondansetron 4 mg disintegrating tablet Commonly known as:  ZOFRAN ODT Take 1 Tab by mouth every eight (8) hours as needed for Nausea. WELCHOL 625 mg tablet Generic drug:  colesevelam  
TAKE 3 TABS BY MOUTH TWO (2) TIMES DAILY (WITH MEALS). We Performed the Following PLATELET FUNCTION, VERIFY NOW P2Y12 [[id number] Custom] Follow-up Instructions Return in about 1 year (around 9/21/2018), or TBD, for MRI now. To-Do List   
 09/21/2017 Imaging:  MRI BRAIN W WO CONT Introducing [address]! Dear Lance Abad: Thank you for requesting a Lookwider account. Our records indicate that you already have an active Lookwider account.   You can access your account anytime at [email or web address] Did you know that you can access your hospital and ER discharge instructions at any time in advisorCONNECT? You can also review all of your test results from your hospital stay or ER visit. Additional Information If you have questions, please visit the Frequently Asked Questions section of the advisorCONNECT website at [email or web address] Remember, advisorCONNECT is NOT to be used for urgent needs. For medical emergencies, dial 911. Now available from your iPhone and Android! Please provide this summary of care documentation to your next provider. Your primary care clinician is listed as Garfield Macias. If you have any questions after today's visit, please call [phone].

## 2017-09-30 ENCOUNTER — HOSPITAL ENCOUNTER (OUTPATIENT)
Dept: MRI IMAGING | Age: 77
Discharge: HOME | End: 2017-09-30
Attending: NURSE PRACTITIONER
Payer: MEDICARE

## 2017-09-30 DIAGNOSIS — I65.02 OCCLUSION OF LEFT VERTEBRAL ARTERY: ICD-10-CM

## 2017-09-30 DIAGNOSIS — I63.541 CEREBRAL INFARCTION INVOLVING RIGHT CEREBELLAR ARTERY (HCC): ICD-10-CM

## 2017-09-30 DIAGNOSIS — I65.01 STENOSIS OF RIGHT VERTEBRAL ARTERY: ICD-10-CM

## 2017-09-30 PROCEDURE — 70553 MRI BRAIN STEM W/O & W/DYE: CPT

## 2017-09-30 PROCEDURE — 74011250636 HC RX REV CODE- 250/636: Performed by: NURSE PRACTITIONER

## 2017-09-30 PROCEDURE — A9576 INJ PROHANCE MULTIPACK: HCPCS | Performed by: NURSE PRACTITIONER

## 2017-09-30 RX ADMIN — GADOTERIDOL 19 ML: 279.3 INJECTION, SOLUTION INTRAVENOUS at 09:00

## 2017-10-03 ENCOUNTER — TELEPHONE (OUTPATIENT)
Dept: NEUROSURGERY | Age: 77
End: 2017-10-03

## 2017-10-03 NOTE — TELEPHONE ENCOUNTER
I discussed recent MRI findings directly with the patient via telephone. There is no evidence of new infarct or CA. These findings taken with the recent CTA findings suggest that the symptom of dizziness does not have a neurovascular etiology. Plavix P2Y12 testing showed a PRU of 187, within therapeutic range. ENT consultation is recommended to eval for other nonvascular causes of dizziness. Clinical followup in our clinic in 1 year. No further imaging is indicated.       Bernard Junior MD

## 2017-10-31 DIAGNOSIS — L30.9 DERMATITIS: ICD-10-CM

## 2017-10-31 RX ORDER — CLOTRIMAZOLE AND BETAMETHASONE DIPROPIONATE 10; .64 MG/G; MG/G
CREAM TOPICAL
Qty: 60 G | Refills: 0 | Status: SHIPPED | OUTPATIENT
Start: 2017-10-31 | End: 2018-11-01 | Stop reason: SDUPTHER

## 2017-11-02 DIAGNOSIS — E11.9 TYPE 2 DIABETES MELLITUS WITHOUT COMPLICATION, WITHOUT LONG-TERM CURRENT USE OF INSULIN (HCC): ICD-10-CM

## 2017-11-02 RX ORDER — GLIMEPIRIDE 4 MG/1
TABLET ORAL
Qty: 180 TAB | Refills: 0 | Status: SHIPPED | OUTPATIENT
Start: 2017-11-02 | End: 2018-01-30 | Stop reason: SDUPTHER

## 2017-11-09 ENCOUNTER — OFFICE VISIT (OUTPATIENT)
Dept: FAMILY MEDICINE CLINIC | Age: 77
End: 2017-11-09

## 2017-11-09 ENCOUNTER — HOSPITAL ENCOUNTER (OUTPATIENT)
Dept: LAB | Age: 77
Discharge: HOME | End: 2017-11-09
Payer: MEDICARE

## 2017-11-09 VITALS
TEMPERATURE: 97.7 F | SYSTOLIC BLOOD PRESSURE: 136 MMHG | DIASTOLIC BLOOD PRESSURE: 68 MMHG | OXYGEN SATURATION: 98 % | HEART RATE: 77 BPM | WEIGHT: 211.6 LBS | RESPIRATION RATE: 18 BRPM | HEIGHT: 69 IN | BODY MASS INDEX: 31.34 KG/M2

## 2017-11-09 DIAGNOSIS — K21.9 GASTROESOPHAGEAL REFLUX DISEASE WITHOUT ESOPHAGITIS: ICD-10-CM

## 2017-11-09 DIAGNOSIS — E11.9 TYPE 2 DIABETES MELLITUS WITHOUT COMPLICATION, WITHOUT LONG-TERM CURRENT USE OF INSULIN (HCC): Primary | ICD-10-CM

## 2017-11-09 DIAGNOSIS — I63.541 CEREBRAL INFARCTION INVOLVING RIGHT CEREBELLAR ARTERY (HCC): ICD-10-CM

## 2017-11-09 DIAGNOSIS — I10 BENIGN ESSENTIAL HTN: ICD-10-CM

## 2017-11-09 DIAGNOSIS — Z23 ENCOUNTER FOR IMMUNIZATION: ICD-10-CM

## 2017-11-09 DIAGNOSIS — E78.00 HYPERCHOLESTEROLEMIA: ICD-10-CM

## 2017-11-09 DIAGNOSIS — C67.9 MALIGNANT NEOPLASM OF URINARY BLADDER, UNSPECIFIED SITE (HCC): ICD-10-CM

## 2017-11-09 PROCEDURE — 83036 HEMOGLOBIN GLYCOSYLATED A1C: CPT

## 2017-11-09 PROCEDURE — 80053 COMPREHEN METABOLIC PANEL: CPT

## 2017-11-09 PROCEDURE — 80061 LIPID PANEL: CPT

## 2017-11-09 NOTE — PROGRESS NOTES
HISTORY OF PRESENT ILLNESS  Vicente Ford is a 65 y.o. male. HPI  Follow up chronic health problems. T2DM. Taking prescribed medications. Reports he had to hold meds for a few days for a procedure. Now back on daily schedule. HTN. Adhering to medication regimen. Hypercholesterolemia. Taking prescribed welchol. Intolerant to statins. 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 Taking prescribed plavix, followed by neuro. Continues to c/o lightheadedness. Recently referred to ENT for further eval.  Bladder cancer. Currently under treatment with urology. GERD. Having some early am nausea and heartburn on daily prevacid. 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[facility] [address] 2014    Dr Derral Bence Skin cancer of face     Ureteral calculus 5/26/2010     Patient Active Problem List   Diagnosis Code    Type 2 diabetes mellitus without complication, without long-term current use of insulin ([address] E11.9    Hypercholesterolemia E78.00    Benign essential HTN I10    Cerebral infarction involving right cerebellar artery ([address] I63.341    Occlusion of left vertebral artery I65.02    Stenosis of right vertebral artery I65.01    History of right MCA stroke Z86.73    Acute CVA (cerebrovascular accident) ([facility] [address] I63.9    Gastroesophageal reflux disease without esophagitis K21.9    Advanced care planning/counseling discussion Z71.89    Malignant neoplasm of urinary bladder (HCC) C67.9     Current Outpatient Prescriptions   Medication Sig    metFORMIN (GLUCOPHAGE) 1,000 mg tablet Take 1 Tab by mouth two (2) times daily (with meals).     glimepiride (AMARYL) 4 mg tablet TAKE 1 TAB BY MOUTH TWO (2) TIMES A DAY.  clotrimazole-betamethasone (LOTRISONE) topical cream APPLY TO AFFECTED AREA TWO (2) TIMES A DAY.  lansoprazole (PREVACID) 30 mg capsule Take 1 Cap by mouth Daily (before breakfast). Office visit due.  amLODIPine-valsartan (EXFORGE)  mg per tablet TAKE 1 TABLET EVERY DAY    WELCHOL 625 mg tablet TAKE 3 TABS BY MOUTH TWO (2) TIMES DAILY (WITH MEALS).  clopidogrel (PLAVIX) 75 mg tab TAKE 1 TABLET EVERY DAY    ondansetron (ZOFRAN ODT) 4 mg disintegrating tablet Take 1 Tab by mouth every eight (8) hours as needed for Nausea.  aspirin delayed-release 81 mg tablet Take 1 Tab by mouth daily.  diclofenac (VOLTAREN) 1 % topical gel Apply 4 g to affected area four (4) times daily. No current facility-administered medications for this visit. Social History   Substance Use Topics    Smoking status: Former Smoker     Packs/day: 1.00     Years: 30.00     Quit date: 5/27/1992    Smokeless tobacco: Never Used    Alcohol use No     Visit Vitals    /68 (BP 1 Location: Left arm, BP Patient Position: Sitting)    Pulse 77    Temp 97.7 °F (36.5 °C) (Oral)    Resp 18    Ht 5' 9\" (1.753 m)    Wt 211 lb 9.6 oz (96 kg)    SpO2 98%    BMI 31.25 kg/m2       Review of Systems   Constitutional: Negative for chills, fever and malaise/fatigue. Eyes: Negative for blurred vision. Respiratory: Negative for cough and shortness of breath. Cardiovascular: Negative for chest pain, palpitations and claudication. Gastrointestinal: Positive for nausea (intermittent). Negative for abdominal pain, blood in stool, constipation, diarrhea and vomiting. Neurological: Positive for dizziness. Negative for tingling, sensory change, weakness and headaches. All other systems reviewed and are negative. Physical Exam   Constitutional: He is oriented to person, place, and time. No distress. Overweight. Neck: Neck supple.    Cardiovascular: Normal rate, regular rhythm and normal heart sounds. Pulmonary/Chest: Effort normal and breath sounds normal.   Abdominal: Soft. He exhibits no distension. There is no tenderness. There is no guarding. Musculoskeletal: Normal range of motion. He exhibits no edema. Lymphadenopathy:     He has no cervical adenopathy. Neurological: He is alert and oriented to person, place, and time. Coordination normal.   Skin: Skin is warm and dry. Psychiatric: He has a normal mood and affect. Advance Care Planning (ACP) Provider Note - Comprehensive     Date of ACP Conversation: 11/09/17  Persons included in Conversation:  patient  Length of ACP Conversation in minutes:  <16 minutes (Non-Billable)    Authorized Decision Maker (if patient is incapable of making informed decisions): This person is:  NA          General ACP for ALL Patients with Decision Making Capacity:   Understanding of the healthcare agent role was assessed and information provided    Review of Existing Advance Directive:  What information were you given about medical decisions to consider before completing your advance directive? documentation  What is your understanding of your agent's willingness to honor your wishes, even if he/she may not agree with them? NA  Does this advance directive still reflect your preferences? Yes (Provide new form/Refer for assistance in updating)    For Serious or Chronic Illness:  Understanding of medical condition      Interventions Provided:  Reviewed existing Advance Directive     ASSESSMENT and PLAN  Diagnoses and all orders for this visit:    1. Type 2 diabetes mellitus without complication, without long-term current use of insulin (HCC)  -     ID HANDLG&/OR CONVEY OF SPEC FOR TR OFFICE TO LAB  -     ID COLLECTION VENOUS BLOOD,VENIPUNCTURE  -     HEMOGLOBIN A1C WITH EAG  -     LIPID PANEL  -     METABOLIC PANEL, COMPREHENSIVE  Controlled based on last A1C. Recheck today. Fasting labs sent.   Reviewed diabetic diet and carbohydrate restriction. Continue current meds. 2. Encounter for immunization  -     Influenza virus vaccine ([address]) 72 years and older (26[address])    3. Hypercholesterolemia  -     LIPID PANEL  Reviewed healthy diet and exercise recommendations. 4. Benign essential HTN  Well controlled. Continue current treatment. 5. Gastroesophageal reflux disease without esophagitis  Moderate sx control with prevacid. Having some am nausea. Trial zantac 150 mg at bedtime prn. GERD precautions reviewed. GI consult if sx persist.    6. Cerebral infarction involving right cerebellar artery (HCC)  Clinically stable. Follow up as scheduled with neuro. 7. Malignant neoplasm of urinary bladder, unspecified site [facility])  Follow up as scheduled with urology. Follow up 6 months or sooner prn.

## 2017-11-09 NOTE — PROGRESS NOTES
1. Have you been to the ER, urgent care clinic since your last visit? Hospitalized since your last visit? No    2. Have you seen or consulted any other health care providers outside of the [address] [place] [address] since your last visit? Include any pap smears or colon screening. No     Chief Complaint   Patient presents with    Hypertension     follow up    Cholesterol Problem     follow up     Learning assessment complete    Abuse Screening Questionnaire 11/9/2017   Do you ever feel afraid of your partner? N   Are you in a relationship with someone who physically or mentally threatens you? N   Is it safe for you to go home? Y     Fall Risk Assessment, last 12 mths 11/9/2017   Able to walk? Yes   Fall in past 12 months?  No

## 2017-11-09 NOTE — MR AVS SNAPSHOT
Visit Information Date & Time Provider Department Dept. Phone Encounter #  
 11/9/2017  8:15 AM Michael Cruz  [facility] [phone] [id number] Upcoming Health Maintenance Date Due  
 EYE EXAM RETINAL OR DILATED Q1 6/25/2015 FOOT EXAM Q1 6/16/2017 Influenza Age 5 to Adult 8/1/2017 HEMOGLOBIN A1C Q6M 11/11/2017 MICROALBUMIN Q1 5/11/2018 LIPID PANEL Q1 5/11/2018 MEDICARE YEARLY EXAM 5/12/2018 GLAUCOMA SCREENING Q2Y 1/24/2019 DTaP/Tdap/Td series (2 - Td) 5/11/2027 Allergies as of 11/9/2017  Review Complete On: 11/9/2017 By: Michael Cruz NP Severity Noted Reaction Type Reactions Benicar [Olmesartan]  05/26/2010    Unable to Obtain Pcn [Penicillins]  05/26/2010    Unable to Obtain Simvastatin  05/26/2010    Myalgia Uniretic [Moexipril-hydrochlorothiazide]  05/26/2010    Other (comments) GI upset Current Immunizations  Reviewed on 10/31/2016 Name Date Influenza High Dose Vaccine PF  Incomplete, 10/27/2016, 10/28/2015, 12/4/2014 Influenza Vaccine 12/12/2012 10:42 AM  
 Influenza Vaccine PF 11/11/2013 Influenza Vaccine Split 11/8/2011, 10/26/2010 Pneumococcal Conjugate (PCV-13) 7/25/2017 Varicella Virus Vaccine Live 2/27/2008 ZZZ-RETIRED (DO NOT USE) Pneumococcal Vaccine (Unspecified Type) 10/13/2006 Zoster Vaccine, Live 10/31/2006 Not reviewed this visit You Were Diagnosed With   
  
 Codes Comments Encounter for immunization    -  Primary ICD-10-CM: B44 ICD-9-CM: V03.89 Type 2 diabetes mellitus without complication, without long-term current use of insulin (HCC)     ICD-10-CM: E11.9 ICD-9-CM: 250.00 Hypercholesterolemia     ICD-10-CM: E78.00 ICD-9-CM: 272.0 Benign essential HTN     ICD-10-CM: I10 
ICD-9-CM: 401.1 Gastroesophageal reflux disease without esophagitis     ICD-10-CM: K21.9 ICD-9-CM: 530.81 Vitals BP Pulse Temp Resp Height(growth percentile) Weight(growth percentile) 136/68 (BP 1 Location: Left arm, BP Patient Position: Sitting) 77 97.7 °F (36.5 °C) (Oral) 18 5' 9\" (1.753 m) 211 lb 9.6 oz (96 kg) SpO2 BMI Smoking Status 98% 31.25 kg/m2 Former Smoker Vitals History BMI and BSA Data Body Mass Index Body Surface Area  
 31.25 kg/m 2 2.16 m 2 Preferred Pharmacy Pharmacy Name Phone [id number] [address] [phone] Your Updated Medication List  
  
   
This list is accurate as of: 11/9/17  8:49 AM.  Always use your most recent med list. amLODIPine-valsartan  mg per tablet Commonly known as:  EXFORGE  
TAKE 1 TABLET EVERY DAY  
  
 aspirin delayed-release 81 mg tablet Take 1 Tab by mouth daily. clopidogrel 75 mg Tab Commonly known as:  PLAVIX TAKE 1 TABLET EVERY DAY  
  
 clotrimazole-betamethasone topical cream  
Commonly known as:  LOTRISONE  
APPLY TO AFFECTED AREA TWO (2) TIMES A DAY. diclofenac 1 % Gel Commonly known as:  VOLTAREN Apply 4 g to affected area four (4) times daily. glimepiride 4 mg tablet Commonly known as:  AMARYL  
TAKE 1 TAB BY MOUTH TWO (2) TIMES A DAY. lansoprazole 30 mg capsule Commonly known as:  PREVACID Take 1 Cap by mouth Daily (before breakfast). Office visit due.  
  
 metFORMIN 1,000 mg tablet Commonly known as:  GLUCOPHAGE Take 1 Tab by mouth two (2) times daily (with meals). ondansetron 4 mg disintegrating tablet Commonly known as:  ZOFRAN ODT Take 1 Tab by mouth every eight (8) hours as needed for Nausea. WELCHOL 625 mg tablet Generic drug:  colesevelam  
TAKE 3 TABS BY MOUTH TWO (2) TIMES DAILY (WITH MEALS). We Performed the Following HEMOGLOBIN A1C WITH EAG [65437 CPT(R)] INFLUENZA VIRUS VACCINE, HIGH DOSE SEASONAL, PRESERVATIVE FREE [74025 CPT(R)] LIPID PANEL [45782 CPT(R)] METABOLIC PANEL, COMPREHENSIVE [11909 CPT(R)] VT COLLECTION VENOUS BLOOD,VENIPUNCTURE [id number] CPT(R)] VT HANDLG&/OR CONVEY OF SPEC FOR TR OFFICE TO LAB [94210 CPT(R)] Introducing [facility]! Dear Cherie Tejada: Thank you for requesting a [employer] account. Our records indicate that you already have an active [employer] account. You can access your account anytime at [email or web address] Did you know that you can access your hospital and ER discharge instructions at any time in [employer]? You can also review all of your test results from your hospital stay or ER visit. Additional Information If you have questions, please visit the Frequently Asked Questions section of the [employer] website at [email or web address] Remember, [employer] is NOT to be used for urgent needs. For medical emergencies, dial 911. Now available from your iPhone and Android! Please provide this summary of care documentation to your next provider. Your primary care clinician is listed as Nova Burris. If you have any questions after today's visit, please call [phone].

## 2017-11-10 LAB
ALBUMIN SERPL-MCNC: 5 G/DL (ref 3.5–4.8)
ALBUMIN/GLOB SERPL: 2.3 {RATIO} (ref 1.2–2.2)
ALP SERPL-CCNC: 82 IU/L (ref 39–117)
ALT SERPL-CCNC: 38 IU/L (ref 0–44)
AST SERPL-CCNC: 32 IU/L (ref 0–40)
BILIRUB SERPL-MCNC: 0.6 MG/DL (ref 0–1.2)
BUN SERPL-MCNC: 17 MG/DL (ref 8–27)
BUN/CREAT SERPL: 16 (ref 10–24)
CALCIUM SERPL-MCNC: 9.7 MG/DL (ref 8.6–10.2)
CHLORIDE SERPL-SCNC: 102 MMOL/L (ref 96–106)
CHOLEST SERPL-MCNC: 121 MG/DL (ref 100–199)
CO2 SERPL-SCNC: 24 MMOL/L (ref 18–29)
CREAT SERPL-MCNC: 1.06 MG/DL (ref 0.76–1.27)
EST. AVERAGE GLUCOSE BLD GHB EST-MCNC: 137 MG/DL
GFR SERPLBLD CREATININE-BSD FMLA CKD-EPI: 67 ML/MIN/1.73
GFR SERPLBLD CREATININE-BSD FMLA CKD-EPI: 77 ML/MIN/1.73
GLOBULIN SER CALC-MCNC: 2.2 G/DL (ref 1.5–4.5)
GLUCOSE SERPL-MCNC: 114 MG/DL (ref 65–99)
HBA1C MFR BLD: 6.4 % (ref 4.8–5.6)
HDLC SERPL-MCNC: 47 MG/DL
INTERPRETATION, 910389: NORMAL
LDLC SERPL CALC-MCNC: 59 MG/DL (ref 0–99)
POTASSIUM SERPL-SCNC: 5.1 MMOL/L (ref 3.5–5.2)
PROT SERPL-MCNC: 7.2 G/DL (ref 6–8.5)
SODIUM SERPL-SCNC: 143 MMOL/L (ref 134–144)
TRIGL SERPL-MCNC: 75 MG/DL (ref 0–149)
VLDLC SERPL CALC-MCNC: 15 MG/DL (ref 5–40)

## 2017-11-12 RX ORDER — CLOPIDOGREL BISULFATE 75 MG/1
TABLET ORAL
Qty: 30 TAB | Refills: 5 | Status: SHIPPED | OUTPATIENT
Start: 2017-11-12 | End: 2018-05-06 | Stop reason: SDUPTHER

## 2017-11-14 RX ORDER — BLOOD SUGAR DIAGNOSTIC
STRIP MISCELLANEOUS
Qty: 100 STRIP | Refills: 0 | Status: SHIPPED | OUTPATIENT
Start: 2017-11-14 | End: 2019-05-21 | Stop reason: SDUPTHER

## 2017-11-18 DIAGNOSIS — E66.3 OVERWEIGHT: ICD-10-CM

## 2017-11-18 DIAGNOSIS — R76.8 RHEUMATOID FACTOR POSITIVE: ICD-10-CM

## 2017-11-18 DIAGNOSIS — I65.09 VERTEBRAL ARTERY OCCLUSION, UNSPECIFIED LATERALITY: ICD-10-CM

## 2017-11-18 DIAGNOSIS — D49.4 BLADDER TUMOR: ICD-10-CM

## 2017-11-18 DIAGNOSIS — Z12.5 SCREENING FOR PROSTATE CANCER: ICD-10-CM

## 2017-11-18 DIAGNOSIS — E11.9 TYPE 2 DIABETES MELLITUS WITHOUT COMPLICATION, WITHOUT LONG-TERM CURRENT USE OF INSULIN (HCC): ICD-10-CM

## 2017-11-20 RX ORDER — AMLODIPINE AND VALSARTAN 10; 320 MG/1; MG/1
TABLET ORAL
Qty: 30 TAB | Refills: 2 | Status: SHIPPED | OUTPATIENT
Start: 2017-11-20 | End: 2018-03-04 | Stop reason: SDUPTHER

## 2017-11-20 RX ORDER — COLESEVELAM HYDROCHLORIDE 625 MG/1
TABLET, FILM COATED ORAL
Qty: 180 TAB | Refills: 2 | Status: SHIPPED | OUTPATIENT
Start: 2017-11-20 | End: 2018-07-05 | Stop reason: SDUPTHER

## 2017-12-21 DIAGNOSIS — K21.9 GASTROESOPHAGEAL REFLUX DISEASE WITHOUT ESOPHAGITIS: ICD-10-CM

## 2017-12-21 RX ORDER — LANSOPRAZOLE 30 MG/1
CAPSULE, DELAYED RELEASE ORAL
Qty: 30 CAP | Refills: 2 | Status: SHIPPED | OUTPATIENT
Start: 2017-12-21 | End: 2018-03-14 | Stop reason: SDUPTHER

## 2017-12-27 ENCOUNTER — TELEPHONE (OUTPATIENT)
Dept: NEUROLOGY | Age: 77
End: 2017-12-27

## 2017-12-27 NOTE — TELEPHONE ENCOUNTER
Dr. Rony Martinez office is requesting most recent office notes and last MRI report. Pt scheduled for surgery Jan 3rd.    Fax: [phone]

## 2018-01-30 DIAGNOSIS — E11.9 TYPE 2 DIABETES MELLITUS WITHOUT COMPLICATION, WITHOUT LONG-TERM CURRENT USE OF INSULIN (HCC): ICD-10-CM

## 2018-01-30 RX ORDER — GLIMEPIRIDE 4 MG/1
TABLET ORAL
Qty: 180 TAB | Refills: 0 | Status: SHIPPED | OUTPATIENT
Start: 2018-01-30 | End: 2018-04-22 | Stop reason: SDUPTHER

## 2018-03-04 DIAGNOSIS — R76.8 RHEUMATOID FACTOR POSITIVE: ICD-10-CM

## 2018-03-04 DIAGNOSIS — E66.3 OVERWEIGHT: ICD-10-CM

## 2018-03-04 DIAGNOSIS — I65.09 VERTEBRAL ARTERY OCCLUSION, UNSPECIFIED LATERALITY: ICD-10-CM

## 2018-03-04 DIAGNOSIS — D49.4 BLADDER TUMOR: ICD-10-CM

## 2018-03-04 DIAGNOSIS — Z12.5 SCREENING FOR PROSTATE CANCER: ICD-10-CM

## 2018-03-06 RX ORDER — AMLODIPINE AND VALSARTAN 10; 320 MG/1; MG/1
TABLET ORAL
Qty: 30 TAB | Refills: 2 | Status: SHIPPED | OUTPATIENT
Start: 2018-03-06 | End: 2018-06-01 | Stop reason: SDUPTHER

## 2018-03-14 DIAGNOSIS — K21.9 GASTROESOPHAGEAL REFLUX DISEASE WITHOUT ESOPHAGITIS: ICD-10-CM

## 2018-03-14 RX ORDER — LANSOPRAZOLE 30 MG/1
CAPSULE, DELAYED RELEASE ORAL
Qty: 30 CAP | Refills: 2 | Status: SHIPPED | OUTPATIENT
Start: 2018-03-14 | End: 2018-06-09 | Stop reason: SDUPTHER

## 2018-05-07 RX ORDER — CLOPIDOGREL BISULFATE 75 MG/1
TABLET ORAL
Qty: 30 TAB | Refills: 4 | Status: SHIPPED | OUTPATIENT
Start: 2018-05-07 | End: 2018-09-28 | Stop reason: SDUPTHER

## 2018-05-10 ENCOUNTER — OFFICE VISIT (OUTPATIENT)
Dept: FAMILY MEDICINE CLINIC | Age: 78
End: 2018-05-10

## 2018-05-10 VITALS
TEMPERATURE: 97.7 F | RESPIRATION RATE: 16 BRPM | HEIGHT: 69 IN | DIASTOLIC BLOOD PRESSURE: 52 MMHG | SYSTOLIC BLOOD PRESSURE: 114 MMHG | BODY MASS INDEX: 31.25 KG/M2 | WEIGHT: 211 LBS | HEART RATE: 68 BPM | OXYGEN SATURATION: 97 %

## 2018-05-10 DIAGNOSIS — E78.00 HYPERCHOLESTEROLEMIA: ICD-10-CM

## 2018-05-10 DIAGNOSIS — C67.9 MALIGNANT NEOPLASM OF URINARY BLADDER, UNSPECIFIED SITE (HCC): ICD-10-CM

## 2018-05-10 DIAGNOSIS — K21.9 GASTROESOPHAGEAL REFLUX DISEASE WITHOUT ESOPHAGITIS: ICD-10-CM

## 2018-05-10 DIAGNOSIS — E11.9 TYPE 2 DIABETES MELLITUS WITHOUT COMPLICATION, WITHOUT LONG-TERM CURRENT USE OF INSULIN (HCC): Primary | ICD-10-CM

## 2018-05-10 DIAGNOSIS — I10 BENIGN ESSENTIAL HTN: ICD-10-CM

## 2018-05-10 RX ORDER — RANITIDINE 150 MG/1
150 TABLET, FILM COATED ORAL 2 TIMES DAILY
COMMUNITY
End: 2020-07-02 | Stop reason: ALTCHOICE

## 2018-05-10 RX ORDER — MELATONIN
DAILY
COMMUNITY

## 2018-05-10 NOTE — ACP (ADVANCE CARE PLANNING)
Advance Care Planning (ACP) Provider Note - Comprehensive     Date of ACP Conversation: 05/10/18  Persons included in Conversation:  patient  Length of ACP Conversation in minutes:  <16 minutes (Non-Billable)    Authorized Decision Maker (if patient is incapable of making informed decisions): This person is:  Healthcare Agent/Medical Power of  under Advance Directive          General ACP for ALL Patients with Decision Making Capacity:   Understanding of the healthcare agent role was assessed and information provided    Review of Existing Advance Directive:  Does this advance directive still reflect your preferences?   Yes (Provide new form/Refer for assistance in updating)    For Serious or Chronic Illness:  Understanding of medical condition      Interventions Provided:  Reviewed existing Advance Directive

## 2018-05-10 NOTE — PROGRESS NOTES
HISTORY OF PRESENT ILLNESS  Mortimer Deaner is a 77 y.o. male. HPI  Follow up chronic medical problems. Hypercholesterolemia. Taking prescribed statin. Following healthy diet, stays active doing yard work. HTN. Adhering to medication regimen.  T2DM. Has reduced sugar and processed carbs in diet. Taking prescribed medications. Bladder cancer. Under the care of urology. GERD. Taking daily prevacid. Has occasional flare of symptoms. Adds zantac at bedtime prn with some sx relief. S/p CVA, 10/2016 with vertebral artery stenosis bilateral.  Taking daily plavix. No new focal neurological deficits. Past Medical History:   Diagnosis Date    Calculus of kidney     Cerebral artery occlusion with cerebral infarction ([facility] [address] 10/2016    Elevated cholesterol 5/26/2010    Fracture of right ankle 5/26/2010    HBP (high blood pressure) 5/26/2010    NIDDM (non-insulin dependent diabetes mellitus) 05/26/2010    Occlusion of left vertebral artery 2016    Primary urethral papillary carcinoma ([facility] [address] 2014    Dr Owen Gonzales Skin cancer of face     Stenosis of right vertebral artery     Ureteral calculus 5/26/2010     Patient Active Problem List   Diagnosis Code    Type 2 diabetes mellitus without complication, without long-term current use of insulin (HCC) E11.9    Hypercholesterolemia E78.00    Benign essential HTN I10    Gastroesophageal reflux disease without esophagitis K21.9    Advanced care planning/counseling discussion Z71.89    Malignant neoplasm of urinary bladder (HCC) C67.9     Current Outpatient Prescriptions   Medication Sig    cholecalciferol (VITAMIN D3) 1,000 unit tablet Take  by mouth daily.  raNITIdine (ZANTAC) 150 mg tablet Take 150 mg by mouth two (2) times a day.  clopidogrel (PLAVIX) 75 mg tab TAKE 1 TABLET EVERY DAY    metFORMIN (GLUCOPHAGE) 1,000 mg tablet Take 1 Tab by mouth two (2) times daily (with meals). Appointment due.     glimepiride (AMARYL) 4 mg tablet Take 1 Tab by mouth two (2) times a day. Appointment due.  lansoprazole (PREVACID) 30 mg capsule TAKE 1 CAP BY MOUTH DAILY (BEFORE BREAKFAST).  amLODIPine-valsartan (EXFORGE)  mg per tablet TAKE 1 TABLET EVERY DAY    WELCHOL 625 mg tablet TAKE 3 TABS BY MOUTH TWO (2) TIMES DAILY (WITH MEALS).  ACCU-CHEK GIANNI PLUS TEST STRP strip TEST DAILY    clotrimazole-betamethasone (LOTRISONE) topical cream APPLY TO AFFECTED AREA TWO (2) TIMES A DAY.  ondansetron (ZOFRAN ODT) 4 mg disintegrating tablet Take 1 Tab by mouth every eight (8) hours as needed for Nausea.  aspirin delayed-release 81 mg tablet Take 1 Tab by mouth daily.  diclofenac (VOLTAREN) 1 % topical gel Apply 4 g to affected area four (4) times daily. No current facility-administered medications for this visit. Social History   Substance Use Topics    Smoking status: Former Smoker     Packs/day: 1.00     Years: 30.00     Quit date: 5/27/1992    Smokeless tobacco: Never Used    Alcohol use No     Visit Vitals    /52    Pulse 68    Temp 97.7 °F (36.5 °C) (Oral)    Resp 16    Ht 5' 9\" (1.753 m)    Wt 211 lb (95.7 kg)    SpO2 97%    BMI 31.16 kg/m2     Review of Systems   Constitutional: Negative for chills, fever and malaise/fatigue. Respiratory: Negative for cough and shortness of breath. Cardiovascular: Negative for chest pain, palpitations, leg swelling and PND. Gastrointestinal: Positive for heartburn (intermittent). Negative for blood in stool and melena. Neurological: Negative. Negative for weakness. All other systems reviewed and are negative. Physical Exam   Constitutional: No distress. Overweight. Neck: Neck supple. No JVD present. Cardiovascular: Normal rate, regular rhythm and normal heart sounds. Pulmonary/Chest: Effort normal and breath sounds normal.   Abdominal: Soft. He exhibits no distension. There is no tenderness. There is no guarding. Musculoskeletal: He exhibits no edema. Lymphadenopathy:     He has no cervical adenopathy. Neurological: He is alert. Coordination normal.   Skin: Skin is warm and dry. Psychiatric: He has a normal mood and affect. His behavior is normal. Thought content normal.       ASSESSMENT and PLAN  Diagnoses and all orders for this visit:    1. Type 2 diabetes mellitus without complication, without long-term current use of insulin (HCC)  -     HEMOGLOBIN A1C WITH EAG  -     MICROALBUMIN, UR, RAND W/ MICROALB/CREAT RATIO  Reviewed diabetic diet and carbohydrate restriction. 2. Hypercholesterolemia  -     LIPID PANEL  -     METABOLIC PANEL, COMPREHENSIVE  Fasting labs sent. 3. Benign essential HTN  Controlled. Continue current treatment. 4. Gastroesophageal reflux disease without esophagitis  Stable on current treatment. 5. BMI 31.0-31.9,adult  Reviewed healthy diet and exercise recommendations. 6. Malignant neoplasm of urinary bladder, unspecified site [facility])  Follow up as scheduled with urologist.    Follow up 6 months or sooner prn.

## 2018-05-10 NOTE — MR AVS SNAPSHOT
[facility] 
 
 
 [address] 
[phone] Patient: Joelle North MRN: [id number] :[id number] Visit Information Date & Time Provider Department Dept. Phone Encounter #  
 5/10/2018  8:00 AM aLuren Mann  Psychiatric [phone] [id number] Upcoming Health Maintenance Date Due  
 EYE EXAM RETINAL OR DILATED Q1 2015 FOOT EXAM Q1 2018* MEDICARE YEARLY EXAM 2018 Influenza Age 5 to Adult 2018 LIPID PANEL Q1 2018 HEMOGLOBIN A1C Q6M 11/10/2018 GLAUCOMA SCREENING Q2Y 2019 MICROALBUMIN Q1 5/10/2019 DTaP/Tdap/Td series (2 - Td) 2027 *Topic was postponed. The date shown is not the original due date. Allergies as of 5/10/2018  Review Complete On: 5/10/2018 By: Lauren Mann NP Severity Noted Reaction Type Reactions Benicar [Olmesartan]  2010    Unable to Obtain Pcn [Penicillins]  2010    Unable to Obtain Simvastatin  2010    Myalgia Uniretic [Moexipril-hydrochlorothiazide]  2010    Other (comments) GI upset Current Immunizations  Reviewed on 2017 Name Date Influenza High Dose Vaccine PF 2017, 10/27/2016, 10/28/2015, 2014 Influenza Vaccine 2012 10:42 AM  
 Influenza Vaccine PF 2013 Influenza Vaccine Split 2011, 10/26/2010 Pneumococcal Conjugate (PCV-13) 2017 Varicella Virus Vaccine Live 2008 ZZZ-RETIRED (DO NOT USE) Pneumococcal Vaccine (Unspecified Type) 10/13/2006 Zoster Vaccine, Live 10/31/2006 Not reviewed this visit You Were Diagnosed With   
  
 Codes Comments Type 2 diabetes mellitus without complication, without long-term current use of insulin (HCC)    -  Primary ICD-10-CM: E11.9 ICD-9-CM: 250.00 Hypercholesterolemia     ICD-10-CM: E78.00 ICD-9-CM: 272.0  Benign essential HTN     ICD-10-CM: I10 
 ICD-9-CM: 401.1 Gastroesophageal reflux disease without esophagitis     ICD-10-CM: K21.9 ICD-9-CM: 530.81 BMI 31.0-31.9,adult     ICD-10-CM: Z68.31 
ICD-9-CM: V85.31 Vitals BP Pulse Temp Resp Height(growth percentile) Weight(growth percentile) 114/52 68 97.7 °F (36.5 °C) (Oral) 16 5' 9\" (1.753 m) 211 lb (95.7 kg) SpO2 BMI Smoking Status 97% 31.16 kg/m2 Former Smoker Vitals History BMI and BSA Data Body Mass Index Body Surface Area  
 31.16 kg/m 2 2.16 m 2 Preferred Pharmacy Pharmacy Name Phone [id number] [address] [facility] [phone] Your Updated Medication List  
  
   
This list is accurate as of 5/10/18  8:38 AM.  Always use your most recent med list.  
  
  
  
  
 ACCU-CHEK GIANNI PLUS TEST STRP strip Generic drug:  glucose blood VI test strips TEST DAILY  
  
 amLODIPine-valsartan  mg per tablet Commonly known as:  EXFORGE  
TAKE 1 TABLET EVERY DAY  
  
 aspirin delayed-release 81 mg tablet Take 1 Tab by mouth daily. clopidogrel 75 mg Tab Commonly known as:  PLAVIX TAKE 1 TABLET EVERY DAY  
  
 clotrimazole-betamethasone topical cream  
Commonly known as:  LOTRISONE  
APPLY TO AFFECTED AREA TWO (2) TIMES A DAY. diclofenac 1 % Gel Commonly known as:  VOLTAREN Apply 4 g to affected area four (4) times daily. glimepiride 4 mg tablet Commonly known as:  AMARYL Take 1 Tab by mouth two (2) times a day. Appointment due.  
  
 lansoprazole 30 mg capsule Commonly known as:  PREVACID TAKE 1 CAP BY MOUTH DAILY (BEFORE BREAKFAST). metFORMIN 1,000 mg tablet Commonly known as:  GLUCOPHAGE Take 1 Tab by mouth two (2) times daily (with meals). Appointment due. ondansetron 4 mg disintegrating tablet Commonly known as:  ZOFRAN ODT Take 1 Tab by mouth every eight (8) hours as needed for Nausea. VITAMIN D3 1,000 unit tablet Generic drug:  cholecalciferol Take  by mouth daily. WELCHOL 625 mg tablet Generic drug:  colesevelam  
TAKE 3 TABS BY MOUTH TWO (2) TIMES DAILY (WITH MEALS). ZANTAC 150 mg tablet Generic drug:  raNITIdine Take 150 mg by mouth two (2) times a day. We Performed the Following HEMOGLOBIN A1C WITH EAG [37971 CPT(R)] LIPID PANEL [70443 CPT(R)] METABOLIC PANEL, COMPREHENSIVE [66824 CPT(R)] MICROALBUMIN, UR, RAND W/ MICROALB/CREAT RATIO [id number] CPT(R)] Introducing [facility]! Dear Nitin Pulido: Thank you for requesting a [employer] account. Our records indicate that you already have an active [employer] account. You can access your account anytime at [email or web address] Did you know that you can access your hospital and ER discharge instructions at any time in [employer]? You can also review all of your test results from your hospital stay or ER visit. Additional Information If you have questions, please visit the Frequently Asked Questions section of the [employer] website at [email or web address] Remember, [employer] is NOT to be used for urgent needs. For medical emergencies, dial 911. Now available from your iPhone and Android! Please provide this summary of care documentation to your next provider. Your primary care clinician is listed as Nicki Man. If you have any questions after today's visit, please call [phone].

## 2018-05-10 NOTE — PROGRESS NOTES
Chief Complaint   Patient presents with    Diabetes     Follow up    Cholesterol Problem     Follow up    Hypertension     Follow up    Labs     Fasting   1. Have you been to the ER, urgent care clinic since your last visit? Hospitalized since your last visit? No    2. Have you seen or consulted any other health care providers outside of the [address] [place] [address] since your last visit? Include any pap smears or colon screening.  Yes to his Urologist (Dr. Eldon Vargas) 1/2018 and will be seeing him 5/31/2018

## 2018-05-11 LAB
ALBUMIN SERPL-MCNC: 4.6 G/DL (ref 3.5–4.8)
ALBUMIN/CREAT UR: 5.8 MG/G CREAT (ref 0–30)
ALBUMIN/GLOB SERPL: 2 {RATIO} (ref 1.2–2.2)
ALP SERPL-CCNC: 77 IU/L (ref 39–117)
ALT SERPL-CCNC: 32 IU/L (ref 0–44)
AST SERPL-CCNC: 29 IU/L (ref 0–40)
BILIRUB SERPL-MCNC: 0.4 MG/DL (ref 0–1.2)
BUN SERPL-MCNC: 16 MG/DL (ref 8–27)
BUN/CREAT SERPL: 16 (ref 10–24)
CALCIUM SERPL-MCNC: 9 MG/DL (ref 8.6–10.2)
CHLORIDE SERPL-SCNC: 103 MMOL/L (ref 96–106)
CHOLEST SERPL-MCNC: 107 MG/DL (ref 100–199)
CO2 SERPL-SCNC: 22 MMOL/L (ref 18–29)
CREAT SERPL-MCNC: 0.99 MG/DL (ref 0.76–1.27)
CREAT UR-MCNC: 120.5 MG/DL
EST. AVERAGE GLUCOSE BLD GHB EST-MCNC: 143 MG/DL
GFR SERPLBLD CREATININE-BSD FMLA CKD-EPI: 72 ML/MIN/1.73
GFR SERPLBLD CREATININE-BSD FMLA CKD-EPI: 83 ML/MIN/1.73
GLOBULIN SER CALC-MCNC: 2.3 G/DL (ref 1.5–4.5)
GLUCOSE SERPL-MCNC: 124 MG/DL (ref 65–99)
HBA1C MFR BLD: 6.6 % (ref 4.8–5.6)
HDLC SERPL-MCNC: 40 MG/DL
INTERPRETATION, 910389: NORMAL
LDLC SERPL CALC-MCNC: 52 MG/DL (ref 0–99)
MICROALBUMIN UR-MCNC: 7 UG/ML
POTASSIUM SERPL-SCNC: 4.9 MMOL/L (ref 3.5–5.2)
PROT SERPL-MCNC: 6.9 G/DL (ref 6–8.5)
SODIUM SERPL-SCNC: 143 MMOL/L (ref 134–144)
TRIGL SERPL-MCNC: 77 MG/DL (ref 0–149)
VLDLC SERPL CALC-MCNC: 15 MG/DL (ref 5–40)

## 2018-06-01 DIAGNOSIS — R76.8 RHEUMATOID FACTOR POSITIVE: ICD-10-CM

## 2018-06-01 DIAGNOSIS — Z12.5 SCREENING FOR PROSTATE CANCER: ICD-10-CM

## 2018-06-01 DIAGNOSIS — I65.09 VERTEBRAL ARTERY OCCLUSION, UNSPECIFIED LATERALITY: ICD-10-CM

## 2018-06-01 DIAGNOSIS — D49.4 BLADDER TUMOR: ICD-10-CM

## 2018-06-01 DIAGNOSIS — E66.3 OVERWEIGHT: ICD-10-CM

## 2018-06-01 RX ORDER — AMLODIPINE AND VALSARTAN 10; 320 MG/1; MG/1
TABLET ORAL
Qty: 30 TAB | Refills: 2 | Status: SHIPPED | OUTPATIENT
Start: 2018-06-01 | End: 2018-08-22 | Stop reason: SDUPTHER

## 2018-07-27 DIAGNOSIS — E11.9 TYPE 2 DIABETES MELLITUS WITHOUT COMPLICATION, WITHOUT LONG-TERM CURRENT USE OF INSULIN (HCC): ICD-10-CM

## 2018-07-27 RX ORDER — GLIMEPIRIDE 4 MG/1
TABLET ORAL
Qty: 180 TAB | Refills: 0 | Status: SHIPPED | OUTPATIENT
Start: 2018-07-27 | End: 2018-10-17 | Stop reason: SDUPTHER

## 2018-08-22 DIAGNOSIS — Z12.5 SCREENING FOR PROSTATE CANCER: ICD-10-CM

## 2018-08-22 DIAGNOSIS — D49.4 BLADDER TUMOR: ICD-10-CM

## 2018-08-22 DIAGNOSIS — I65.09 VERTEBRAL ARTERY OCCLUSION, UNSPECIFIED LATERALITY: ICD-10-CM

## 2018-08-22 DIAGNOSIS — E66.3 OVERWEIGHT: ICD-10-CM

## 2018-08-22 DIAGNOSIS — R76.8 RHEUMATOID FACTOR POSITIVE: ICD-10-CM

## 2018-08-22 RX ORDER — AMLODIPINE AND VALSARTAN 10; 320 MG/1; MG/1
TABLET ORAL
Qty: 30 TAB | Refills: 2 | Status: SHIPPED | OUTPATIENT
Start: 2018-08-22 | End: 2018-12-05 | Stop reason: SDUPTHER

## 2018-10-01 DIAGNOSIS — K21.9 GASTROESOPHAGEAL REFLUX DISEASE WITHOUT ESOPHAGITIS: ICD-10-CM

## 2018-10-01 RX ORDER — LANSOPRAZOLE 30 MG/1
CAPSULE, DELAYED RELEASE ORAL
Qty: 30 CAP | Refills: 3 | Status: SHIPPED | OUTPATIENT
Start: 2018-10-01 | End: 2019-01-29 | Stop reason: SDUPTHER

## 2018-10-09 ENCOUNTER — OFFICE VISIT (OUTPATIENT)
Dept: NEUROLOGY | Age: 78
End: 2018-10-09

## 2018-10-09 VITALS — BODY MASS INDEX: 30.66 KG/M2 | WEIGHT: 207 LBS | RESPIRATION RATE: 18 BRPM | HEIGHT: 69 IN

## 2018-10-09 DIAGNOSIS — I65.02 OCCLUSION OF LEFT VERTEBRAL ARTERY: Primary | ICD-10-CM

## 2018-10-09 DIAGNOSIS — Z91.89 AT RISK FOR STROKE: ICD-10-CM

## 2018-10-09 NOTE — PATIENT INSTRUCTIONS

## 2018-10-09 NOTE — PROGRESS NOTES
Chief Complaint Patient presents with  Neurologic Problem f/u HPI Mr. Rian Martell is a 18-year-old gentleman here to follow-up specifically about having clearance for a bladder procedure. I have been seeing him since 2016 for stroke. He is currently on aspirin and Plavix combination therapy due to bilateral vertebral disease manifesting as a left vertebral occlusion and right vertebral stenosis. He also has bilateral internal carotid artery atherosclerosis. He has had anterior and posterior circulation strokes. He is compliant with medication. He does admit to feeling vertigo in the morning but there is no double vision or slurred speech or unilateral weakness or numbness. He has concomitant GERD. He has a history of bladder cancer and has been needing chemo. He needs to have surveillance of bladder evaluation. Review of Systems Eyes: Negative for double vision. Gastrointestinal: Negative for nausea. Musculoskeletal: Positive for myalgias. Neurological: Positive for dizziness. Negative for headaches. All other systems reviewed and are negative. Past Medical History:  
Diagnosis Date  Calculus of kidney  Cerebral artery occlusion with cerebral infarction ([facility] [address] 10/2016  Elevated cholesterol 5/26/2010  Fracture of right ankle 5/26/2010  
 HBP (high blood pressure) 5/26/2010  
 NIDDM (non-insulin dependent diabetes mellitus) 05/26/2010  Occlusion of left vertebral artery 2016  Primary urethral papillary carcinoma ([facility] [address] 2014 Dr Alonso Cox  Skin cancer of face  Stenosis of right vertebral artery  Ureteral calculus 5/26/2010 History reviewed. No pertinent family history. Social History Social History  Marital status:  Spouse name: N/A  
 Number of children: N/A  
 Years of education: N/A Occupational History  Not on file. Social History Main Topics  Smoking status: Former Smoker   Packs/day: 1.00  
 Years: 30.00 Quit date: 5/27/1992  Smokeless tobacco: Never Used  Alcohol use No  
 Drug use: No  
 Sexual activity: Yes  
  Partners: Female Other Topics Concern  Not on file Social History Narrative Allergies Allergen Reactions  Benicar [Olmesartan] Unable to [employer]  Pcn [Penicillins] Unable to [employer]  Simvastatin Myalgia  Uniretic [Moexipril-Hydrochlorothiazide] Other (comments) GI upset Current Outpatient Prescriptions Medication Sig  clopidogrel (PLAVIX) 75 mg tab Take 1 Tab by mouth daily. Appointment due.  metFORMIN (GLUCOPHAGE) 1,000 mg tablet Take 1 Tab by mouth two (2) times daily (with meals). Appointment due.  lansoprazole (PREVACID) 30 mg capsule TAKE 1 CAP BY MOUTH DAILY (BEFORE BREAKFAST).  amLODIPine-valsartan (EXFORGE)  mg per tablet TAKE 1 TABLET BY MOUTH EVERY DAY  glimepiride (AMARYL) 4 mg tablet TAKE 1 TABLET BY MOUTH 2 TIMES A DAY. **APPOINTMENT DUE**  
 cholecalciferol (VITAMIN D3) 1,000 unit tablet Take  by mouth daily.  raNITIdine (ZANTAC) 150 mg tablet Take 150 mg by mouth two (2) times a day.  ACCU-CHEK GIANNI PLUS TEST STRP strip TEST DAILY  clotrimazole-betamethasone (LOTRISONE) topical cream APPLY TO AFFECTED AREA TWO (2) TIMES A DAY.  ondansetron (ZOFRAN ODT) 4 mg disintegrating tablet Take 1 Tab by mouth every eight (8) hours as needed for Nausea.  aspirin delayed-release 81 mg tablet Take 1 Tab by mouth daily.  diclofenac (VOLTAREN) 1 % topical gel Apply 4 g to affected area four (4) times daily.  colesevelam (WELCHOL) 625 mg tablet TAKE 3 TABS BY MOUTH TWO (2) TIMES DAILY (WITH MEALS). No current facility-administered medications for this visit. Neurologic Exam  
 
Mental Status WD/WN adult in NAD, normal grooming VSS 
A&O x 3 PERRL, nonicteric, no ptosis Face is symmetric, tongue midline Speech is fluent and clear No limb ataxia. No abnl movements. Moving all extemities spontaneously and symmetric Normal gait CVS RRR Lungs nonlabored Skin is warm and dry Visit Vitals  Resp 18  Ht 5' 9\" (1.753 m)  Wt 93.9 kg (207 lb)  BMI 30.57 kg/m2 Assessment and Plan Diagnoses and all orders for this visit: 
 
1. Occlusion of left vertebral artery 2. At risk for stroke 67-year-old gentleman who has a history of anterior posterior circulation strokes in the setting of intracranial atherosclerosis. He is a stroke risk permanently. He requires aspirin and Plavix. I have no restrictions on any urological procedures that need to be done however I have told him adamantly he cannot discontinue aspirin or Plavix. He is a stroke risk and I am concerned that if he discontinues 1 of his medications he could develop an ischemic event. He understands this. Will defer to his urologist the risks and benefits conversation of bleeding risk from any urological procedures. We discussed stroke signs and symptoms in detail. Any double vision or slurred speech or unusual numbness weakness please come to the hospital immediately. Aggressive control of stroke risk factors to include his diabetes and high cholesterol. Follow-up as needed. I reviewed and decided to continue the current medications. 2 [facility], DO 
NEUROLOGIST Diplomate ASHLEY

## 2018-10-09 NOTE — MR AVS SNAPSHOT
[place] [address] [address] [place] [address] 
[phone] Patient: Whit Acevedo MRN: [id number] :1940 Visit Information Date & Time Provider Department Dept. Phone Encounter #  
 10/9/2018  8:40 AM Charito Parra DO [facility] at [address] [id number] Follow-up Instructions Return if symptoms worsen or fail to improve. Routing History Upcoming Health Maintenance Date Due Shingrix Vaccine Age 50> (1 of 2) 1989 MEDICARE YEARLY EXAM 2018 Influenza Age 5 to Adult 2018 FOOT EXAM Q1 2018* HEMOGLOBIN A1C Q6M 11/10/2018 EYE EXAM RETINAL OR DILATED Q1 2019 MICROALBUMIN Q1 5/10/2019 LIPID PANEL Q1 5/10/2019 GLAUCOMA SCREENING Q2Y 2020 DTaP/Tdap/Td series (2 - Td) 2027 *Topic was postponed. The date shown is not the original due date. Allergies as of 10/9/2018  Review Complete On: 10/9/2018 By: Boris Burton LPN Severity Noted Reaction Type Reactions Benicar [Olmesartan]  2010    Unable to Obtain Pcn [Penicillins]  2010    Unable to Obtain Simvastatin  2010    Myalgia Uniretic [Moexipril-hydrochlorothiazide]  2010    Other (comments) GI upset Current Immunizations  Reviewed on 2017 Name Date Influenza High Dose Vaccine PF 2017, 10/27/2016, 10/28/2015, 2014 Influenza Vaccine 2012 10:42 AM  
 Influenza Vaccine PF 2013 Influenza Vaccine Split 2011, 10/26/2010 Pneumococcal Conjugate (PCV-13) 2017 Varicella Virus Vaccine Live 2008 ZZZ-RETIRED (DO NOT USE) Pneumococcal Vaccine (Unspecified Type) 10/13/2006 Zoster Vaccine, Live 10/31/2006 Not reviewed this visit You Were Diagnosed With   
  
 Codes Comments  Occlusion of left vertebral artery    -  Primary ICD-10-CM: I65.02 
 ICD-9-CM: 433.20 At risk for stroke     ICD-10-CM: Z91.89 ICD-9-CM: V15.89 Vitals Resp Height(growth percentile) Weight(growth percentile) BMI Smoking Status 18 5' 9\" (1.753 m) 207 lb (93.9 kg) 30.57 kg/m2 Former Smoker Vitals History BMI and BSA Data Body Mass Index Body Surface Area 30.57 kg/m 2 2.14 m 2 Preferred Pharmacy Pharmacy Name Phone [id number]- [facility] [address] [phone] Your Updated Medication List  
  
   
This list is accurate as of 10/9/18  8:47 AM.  Always use your most recent med list.  
  
  
  
  
 ACCU-CHEK GIANNI PLUS TEST STRP strip Generic drug:  glucose blood VI test strips TEST DAILY  
  
 amLODIPine-valsartan  mg per tablet Commonly known as:  EXFORGE  
TAKE 1 TABLET BY MOUTH EVERY DAY  
  
 aspirin delayed-release 81 mg tablet Take 1 Tab by mouth daily. clopidogrel 75 mg Tab Commonly known as:  PLAVIX Take 1 Tab by mouth daily. Appointment due. clotrimazole-betamethasone topical cream  
Commonly known as:  LOTRISONE  
APPLY TO AFFECTED AREA TWO (2) TIMES A DAY. colesevelam 625 mg tablet Commonly known as:  WELCHOL  
TAKE 3 TABS BY MOUTH TWO (2) TIMES DAILY (WITH MEALS). diclofenac 1 % Gel Commonly known as:  VOLTAREN Apply 4 g to affected area four (4) times daily. glimepiride 4 mg tablet Commonly known as:  AMARYL  
TAKE 1 TABLET BY MOUTH 2 TIMES A DAY. **APPOINTMENT DUE**  
  
 lansoprazole 30 mg capsule Commonly known as:  PREVACID TAKE 1 CAP BY MOUTH DAILY (BEFORE BREAKFAST). metFORMIN 1,000 mg tablet Commonly known as:  GLUCOPHAGE Take 1 Tab by mouth two (2) times daily (with meals). Appointment due. ondansetron 4 mg disintegrating tablet Commonly known as:  ZOFRAN ODT Take 1 Tab by mouth every eight (8) hours as needed for Nausea. VITAMIN D3 1,000 unit tablet Generic drug:  cholecalciferol Take  by mouth daily. ZANTAC 150 mg tablet Generic drug:  raNITIdine Take 150 mg by mouth two (2) times a day. Follow-up Instructions Return if symptoms worsen or fail to improve. Patient Instructions A Healthy Lifestyle: Care Instructions Your Care Instructions A healthy lifestyle can help you feel good, stay at a healthy weight, and have plenty of energy for both work and play. A healthy lifestyle is something you can share with your whole family. A healthy lifestyle also can lower your risk for serious health problems, such as high blood pressure, heart disease, and diabetes. You can follow a few steps listed below to improve your health and the health of your family. Follow-up care is a key part of your treatment and safety. Be sure to make and go to all appointments, and call your doctor if you are having problems. It's also a good idea to know your test results and keep a list of the medicines you take. How can you care for yourself at home? · Do not eat too much sugar, fat, or fast foods. You can still have dessert and treats now and then. The goal is moderation. · Start small to improve your eating habits. Pay attention to portion sizes, drink less juice and soda pop, and eat more fruits and vegetables. ¨ Eat a healthy amount of food. A 3-ounce serving of meat, for example, is about the size of a deck of cards. Fill the rest of your plate with vegetables and whole grains. ¨ Limit the amount of soda and sports drinks you have every day. Drink more water when you are thirsty. ¨ Eat at least 5 servings of fruits and vegetables every day. It may seem like a lot, but it is not hard to reach this goal. A serving or helping is 1 piece of fruit, 1 cup of vegetables, or 2 cups of leafy, raw vegetables. Have an apple or some carrot sticks as an afternoon snack instead of a candy bar.  Try to have fruits and/or vegetables at every meal. 
 · Make exercise part of your daily routine. You may want to start with simple activities, such as walking, bicycling, or slow swimming. Try to be active 30 to 60 minutes every day. You do not need to do all 30 to 60 minutes all at once. For example, you can exercise 3 times a day for 10 or 20 minutes. Moderate exercise is safe for most people, but it is always a good idea to talk to your doctor before starting an exercise program. 
· Keep moving. Jaree Memos the lawn, work in the garden, or [employer]. Take the stairs instead of the elevator at work. · If you smoke, quit. People who smoke have an increased risk for heart attack, stroke, cancer, and other lung illnesses. Quitting is hard, but there are ways to boost your chance of quitting tobacco for good. ¨ Use nicotine gum, patches, or lozenges. ¨ Ask your doctor about stop-smoking programs and medicines. ¨ Keep trying. In addition to reducing your risk of diseases in the future, you will notice some benefits soon after you stop using tobacco. If you have shortness of breath or asthma symptoms, they will likely get better within a few weeks after you quit. · Limit how much alcohol you drink. Moderate amounts of alcohol (up to 2 drinks a day for men, 1 drink a day for women) are okay. But drinking too much can lead to liver problems, high blood pressure, and other health problems. Family health If you have a family, there are many things you can do together to improve your health. · Eat meals together as a family as often as possible. · Eat healthy foods. This includes fruits, vegetables, lean meats and dairy, and whole grains. · Include your family in your fitness plan. Most people think of activities such as jogging or tennis as the way to fitness, but there are many ways you and your family can be more active. Anything that makes you breathe hard and gets your heart pumping is exercise. Here are some tips: ¨ Walk to do errands or to take your child to school or the bus. ¨ Go for a family bike ride after dinner instead of watching TV. Where can you learn more? Go to http://rowan-chirag.info/. Enter E682 in the search box to learn more about \"A Healthy Lifestyle: Care Instructions. \" Current as of: December 7, 2017 Content Version: 11.8 © 0958-4090 [employer]. Care instructions adapted under license by [employer] (which disclaims liability or warranty for this information). If you have questions about a medical condition or this instruction, always ask your healthcare professional. [address] any warranty or liability for your use of this information. Introducing [facility] & HEALTH SERVICES! Dear Purnima Nguyen: Thank you for requesting a Cumed account. Our records indicate that you already have an active [employer]umed account. You can access your account anytime at [email or web address] Did you know that you can access your hospital and ER discharge instructions at any time in Cumed? You can also review all of your test results from your hospital stay or ER visit. Additional Information If you have questions, please visit the Frequently Asked Questions section of the Cumed website at [email or web address] Remember, Cumed is NOT to be used for urgent needs. For medical emergencies, dial 911. Now available from your iPhone and Android! Please provide this summary of care documentation to your next provider. Your primary care clinician is listed as Karlene Amos. If you have any questions after today's visit, please call [phone].

## 2018-10-17 DIAGNOSIS — E11.9 TYPE 2 DIABETES MELLITUS WITHOUT COMPLICATION, WITHOUT LONG-TERM CURRENT USE OF INSULIN (HCC): ICD-10-CM

## 2018-10-17 RX ORDER — GLIMEPIRIDE 4 MG/1
TABLET ORAL
Qty: 180 TAB | Refills: 0 | Status: SHIPPED | OUTPATIENT
Start: 2018-10-17 | End: 2018-11-12 | Stop reason: SDUPTHER

## 2018-10-29 DIAGNOSIS — E11.65 TYPE 2 DIABETES MELLITUS WITH HYPERGLYCEMIA, WITHOUT LONG-TERM CURRENT USE OF INSULIN (HCC): ICD-10-CM

## 2018-10-30 RX ORDER — CLOPIDOGREL BISULFATE 75 MG/1
75 TABLET ORAL DAILY
Qty: 30 TAB | Refills: 0 | Status: SHIPPED | OUTPATIENT
Start: 2018-10-30 | End: 2018-11-23 | Stop reason: SDUPTHER

## 2018-10-30 RX ORDER — METFORMIN HYDROCHLORIDE 1000 MG/1
TABLET ORAL
Qty: 60 TAB | Refills: 0 | Status: SHIPPED | OUTPATIENT
Start: 2018-10-30 | End: 2018-11-23 | Stop reason: SDUPTHER

## 2018-11-01 ENCOUNTER — OFFICE VISIT (OUTPATIENT)
Dept: FAMILY MEDICINE CLINIC | Age: 78
End: 2018-11-01

## 2018-11-01 VITALS
TEMPERATURE: 97.8 F | BODY MASS INDEX: 30.87 KG/M2 | OXYGEN SATURATION: 97 % | SYSTOLIC BLOOD PRESSURE: 134 MMHG | DIASTOLIC BLOOD PRESSURE: 60 MMHG | HEIGHT: 69 IN | HEART RATE: 70 BPM | RESPIRATION RATE: 16 BRPM | WEIGHT: 208.4 LBS

## 2018-11-01 DIAGNOSIS — K21.9 GASTROESOPHAGEAL REFLUX DISEASE WITHOUT ESOPHAGITIS: ICD-10-CM

## 2018-11-01 DIAGNOSIS — C67.9 MALIGNANT NEOPLASM OF URINARY BLADDER, UNSPECIFIED SITE (HCC): ICD-10-CM

## 2018-11-01 DIAGNOSIS — I65.09 VERTEBRAL ARTERY OCCLUSION, UNSPECIFIED LATERALITY: ICD-10-CM

## 2018-11-01 DIAGNOSIS — E78.00 HYPERCHOLESTEROLEMIA: ICD-10-CM

## 2018-11-01 DIAGNOSIS — E11.65 TYPE 2 DIABETES MELLITUS WITH HYPERGLYCEMIA, WITHOUT LONG-TERM CURRENT USE OF INSULIN (HCC): ICD-10-CM

## 2018-11-01 DIAGNOSIS — L30.9 DERMATITIS: ICD-10-CM

## 2018-11-01 DIAGNOSIS — Z23 ENCOUNTER FOR IMMUNIZATION: ICD-10-CM

## 2018-11-01 DIAGNOSIS — I10 BENIGN ESSENTIAL HTN: ICD-10-CM

## 2018-11-01 DIAGNOSIS — Z00.00 MEDICARE ANNUAL WELLNESS VISIT, SUBSEQUENT: Primary | ICD-10-CM

## 2018-11-01 DIAGNOSIS — R11.0 NAUSEA: ICD-10-CM

## 2018-11-01 PROBLEM — Z71.89 ADVANCED CARE PLANNING/COUNSELING DISCUSSION: Status: RESOLVED | Noted: 2017-05-11 | Resolved: 2018-11-01

## 2018-11-01 RX ORDER — CLOTRIMAZOLE AND BETAMETHASONE DIPROPIONATE 10; .64 MG/G; MG/G
CREAM TOPICAL
Qty: 60 G | Refills: 0 | Status: SHIPPED | OUTPATIENT
Start: 2018-11-01 | End: 2019-11-05 | Stop reason: SDUPTHER

## 2018-11-01 RX ORDER — ONDANSETRON 4 MG/1
4 TABLET, ORALLY DISINTEGRATING ORAL
Qty: 30 TAB | Refills: 0 | Status: SHIPPED | OUTPATIENT
Start: 2018-11-01 | End: 2020-06-16

## 2018-11-01 NOTE — PATIENT INSTRUCTIONS
Medicare Wellness Visit, Male The best way to live healthy is to have a lifestyle where you eat a well-balanced diet, exercise regularly, limit alcohol use, and quit all forms of tobacco/nicotine, if applicable. Regular preventive services are another way to keep healthy. Preventive services (vaccines, screening tests, monitoring & exams) can help personalize your care plan, which helps you manage your own care. Screening tests can find health problems at the earliest stages, when they are easiest to treat. [address] follows the current, evidence-based guidelines published by the [facility] Marcel Cony ([facility]) when recommending preventive services for our patients. Because we follow these guidelines, sometimes recommendations change over time as research supports it. (For example, a prostate screening blood test is no longer routinely recommended for men with no symptoms.) Of course, you and your doctor may decide to screen more often for some diseases, based on your risk and co-morbidities (chronic disease you are already diagnosed with). Preventive services for you include: - Medicare offers their members a free annual wellness visit, which is time for you and your primary care provider to discuss and plan for your preventive service needs. Take advantage of this benefit every year! 
-All adults over age 72 should receive the recommended pneumonia vaccines. Current USPSTF guidelines recommend a series of two vaccines for the best pneumonia protection.  
-All adults should have a flu vaccine yearly and an ECG.  All adults age 61 and older should receive a shingles vaccine once in their lifetime.   
-All adults age 38-68 who are overweight should have a diabetes screening test once every three years.  
-Other screening tests & preventive services for persons with diabetes include: an eye exam to screen for diabetic retinopathy, a kidney function test, a foot exam, and stricter control over your cholesterol.  
-Cardiovascular screening for adults with routine risk involves an electrocardiogram (ECG) at intervals determined by the provider.  
-Colorectal cancer screening should be done for adults age 54-65 with no increased risk factors for colorectal cancer. There are a number of acceptable methods of screening for this type of cancer. Each test has its own benefits and drawbacks. Discuss with your provider what is most appropriate for you during your annual wellness visit. The different tests include: colonoscopy (considered the best screening method), a fecal occult blood test, a fecal DNA test, and sigmoidoscopy. 
-All adults born between [facility] should be screened once for Hepatitis C. 
-An Abdominal Aortic Aneurysm (AAA) Screening is recommended for men age 73-68 who has ever smoked in their lifetime. Here is a list of your current Health Maintenance items (your personalized list of preventive services) with a due date: 
Health Maintenance Due Topic Date Due  
 Hemoglobin A1C    11/10/2018

## 2018-11-01 NOTE — PROGRESS NOTES
HISTORY OF PRESENT ILLNESS Yina Correia is a 77 y.o. male. HPI Presents for medicare wellness visit and follow up chronic health problems. Hypercholesterolemia. Taking prescribed statin. Following healthy diet and staying active. HTN. Adhering to medication regimen. GERD. Taking prevacid and zantac as prescribed. Continues to have occasional nausea, takes zofran prn with good effect. Bladder cancer. Completed round of chemotherapy. Scheduled for surgery on 11/14/18. T2DM. Taking prescribed meds. Following diabetic diet. S/p CVA with bilateral vertebral artery stenosis. Taking plavix and ASA. Followed by Dr. Jai Slaughter. Past Medical History:  
Diagnosis Date  Calculus of kidney  Cerebral artery occlusion with cerebral infarction ([facility] [address] 10/2016  Elevated cholesterol 5/26/2010  Fracture of right ankle 5/26/2010  
 HBP (high blood pressure) 5/26/2010  
 NIDDM (non-insulin dependent diabetes mellitus) 05/26/2010  Occlusion of left vertebral artery 2016  Primary urethral papillary carcinoma ([facility] [address] 2014 Dr Larisa Dhaliwal  Skin cancer of face  Stenosis of right vertebral artery  Ureteral calculus 5/26/2010 Patient Active Problem List  
Diagnosis Code  Type 2 diabetes mellitus without complication, without long-term current use of insulin (HCC) E11.9  Hypercholesterolemia E78.00  Benign essential HTN I10  Vertebral artery occlusion, unspecified laterality I65.09  
 Gastroesophageal reflux disease without esophagitis K21.9  Malignant neoplasm of urinary bladder (HCC) C67.9 Current Outpatient Medications Medication Sig  
 ondansetron (ZOFRAN ODT) 4 mg disintegrating tablet Take 1 Tab by mouth every eight (8) hours as needed for Nausea.  clotrimazole-betamethasone (LOTRISONE) topical cream APPLY TO AFFECTED AREA TWO (2) TIMES A DAY.   
 metFORMIN (GLUCOPHAGE) 1,000 mg tablet TAKE 1 TABLET BY MOUTH TWICE A DAY WITH MEALS  
  clopidogrel (PLAVIX) 75 mg tab TAKE 1 TAB BY MOUTH DAILY. APPOINTMENT DUE.  glimepiride (AMARYL) 4 mg tablet TAKE 1 TABLET BY MOUTH 2 TIMES A DAY. **APPOINTMENT DUE**  
 colesevelam (WELCHOL) 625 mg tablet Take 3 tablets by mouth 2 times daily. Appointment due.  lansoprazole (PREVACID) 30 mg capsule TAKE 1 CAP BY MOUTH DAILY (BEFORE BREAKFAST).  amLODIPine-valsartan (EXFORGE)  mg per tablet TAKE 1 TABLET BY MOUTH EVERY DAY  cholecalciferol (VITAMIN D3) 1,000 unit tablet Take  by mouth daily.  raNITIdine (ZANTAC) 150 mg tablet Take 150 mg by mouth two (2) times a day.  ACCU-CHEK GIANNI PLUS TEST STRP strip TEST DAILY  aspirin delayed-release 81 mg tablet Take 1 Tab by mouth daily.  diclofenac (VOLTAREN) 1 % topical gel Apply 4 g to affected area four (4) times daily. No current facility-administered medications for this visit. Social History Tobacco Use  Smoking status: Former Smoker Packs/day: 1.00 Years: 30.00 Pack years: 30.00 Last attempt to quit: 1992 Years since quittin.4  Smokeless tobacco: Never Used Substance Use Topics  Alcohol use: No  
 Drug use: No  
 
Visit Vitals /60 Pulse 70 Temp 97.8 °F (36.6 °C) (Oral) Resp 16 Ht 5' 9\" (1.753 m) Wt 208 lb 6.4 oz (94.5 kg) SpO2 97% BMI 30.78 kg/m² Review of Systems Constitutional: Negative for malaise/fatigue. Respiratory: Positive for shortness of breath (mild, exertional). Negative for cough. Cardiovascular: Negative for chest pain, palpitations and leg swelling. Gastrointestinal: Positive for heartburn and nausea. Negative for blood in stool, constipation, diarrhea, melena and vomiting. Neurological: Positive for dizziness. Negative for sensory change, focal weakness and headaches. All other systems reviewed and are negative. Physical Exam  
Constitutional: No distress. Overweight. Neck: Neck supple. No JVD present. Cardiovascular: Normal rate, regular rhythm and normal heart sounds. Pulmonary/Chest: Effort normal and breath sounds normal.  
Abdominal: Soft. He exhibits no distension. There is no tenderness. There is no guarding. Musculoskeletal: Normal range of motion. He exhibits no edema. Lymphadenopathy:  
  He has no cervical adenopathy. Neurological: He is alert. No cranial nerve deficit. Skin: Skin is warm and dry. Psychiatric: He has a normal mood and affect. His behavior is normal. Thought content normal.  
 
 
ASSESSMENT and PLAN Diagnoses and all orders for this visit: 
 
1. Medicare annual wellness visit, subsequent See separate note. 2. Encounter for immunization 
-     INFLUENZA VACCINE INACTIVATED (IIV), SUBUNIT, ADJUVANTED, IM 
-     ADMIN INFLUENZA VIRUS VAC 3. Type 2 diabetes mellitus with hyperglycemia, without long-term current use of insulin (HCC) 
-     HEMOGLOBIN A1C WITH EAG Controlled based on last A1C. Reviewed diabetic diet and carbohydrate restriction. Continue current treatment. 4. Gastroesophageal reflux disease without esophagitis 
-     ondansetron (ZOFRAN ODT) 4 mg disintegrating tablet; Take 1 Tab by mouth every eight (8) hours as needed for Nausea. Continue prevacid. May use zofran prn as directed. GERD precautions reviewed. 5. Vertebral artery occlusion, unspecified laterality Clinically stable. Follow up as scheduled with neurology. 6. Hypercholesterolemia -     LIPID PANEL 
-     METABOLIC PANEL, COMPREHENSIVE 
-     [place] HANDLG&/OR CONVEY OF SPEC FOR TR OFFICE TO LAB Fasting labs sent. Reviewed healthy diet and exercise recommendations. 7. Benign essential HTN Controlled. 8. Dermatitis -     clotrimazole-betamethasone (LOTRISONE) topical cream; APPLY TO AFFECTED AREA TWO (2) TIMES A DAY. 9. Nausea 
-     ondansetron (ZOFRAN ODT) 4 mg disintegrating tablet; Take 1 Tab by mouth every eight (8) hours as needed for Nausea. 10. Malignant neoplasm of urinary bladder, unspecified site ([facility] [address] Follow up as scheduled with oncology/urology. Follow up 6 months or sooner prn. This is the Subsequent Medicare Annual Wellness Exam, performed 12 months or more after the Initial AWV or the last Subsequent AWV I have reviewed the patient's medical history in detail and updated the computerized patient record. History Past Medical History:  
Diagnosis Date  Calculus of kidney  Cerebral artery occlusion with cerebral infarction ([facility] [address] 10/2016  Elevated cholesterol 5/26/2010  Fracture of right ankle 5/26/2010  
 HBP (high blood pressure) 5/26/2010  
 NIDDM (non-insulin dependent diabetes mellitus) 05/26/2010  Occlusion of left vertebral artery 2016  Primary urethral papillary carcinoma ([facility] [address] 2014 Dr Yony Peterson  Skin cancer of face  Stenosis of right vertebral artery  Ureteral calculus 5/26/2010 Past Surgical History:  
Procedure Laterality Date  ENDOSCOPY, COLON, DIAGNOSTIC  02; 2/07; 2012  
 polyp 5 yr  HX TONSILLECTOMY  HX UROLOGICAL  2014  
 bladder  HX UROLOGICAL  2015  
 bladder Current Outpatient Medications Medication Sig Dispense Refill  ondansetron (ZOFRAN ODT) 4 mg disintegrating tablet Take 1 Tab by mouth every eight (8) hours as needed for Nausea. 30 Tab 0  clotrimazole-betamethasone (LOTRISONE) topical cream APPLY TO AFFECTED AREA TWO (2) TIMES A DAY. 60 g 0  
 metFORMIN (GLUCOPHAGE) 1,000 mg tablet TAKE 1 TABLET BY MOUTH TWICE A DAY WITH MEALS 60 Tab 0  clopidogrel (PLAVIX) 75 mg tab TAKE 1 TAB BY MOUTH DAILY. APPOINTMENT DUE. 30 Tab 0  
 glimepiride (AMARYL) 4 mg tablet TAKE 1 TABLET BY MOUTH 2 TIMES A DAY. **APPOINTMENT DUE** 180 Tab 0  
 colesevelam (WELCHOL) 625 mg tablet Take 3 tablets by mouth 2 times daily. Appointment due. 180 Tab 0  
 lansoprazole (PREVACID) 30 mg capsule TAKE 1 CAP BY MOUTH DAILY (BEFORE BREAKFAST).  30 Cap 3  
  amLODIPine-valsartan (EXFORGE)  mg per tablet TAKE 1 TABLET BY MOUTH EVERY DAY 30 Tab 2  cholecalciferol (VITAMIN D3) 1,000 unit tablet Take  by mouth daily.  raNITIdine (ZANTAC) 150 mg tablet Take 150 mg by mouth two (2) times a day.  ACCU-CHEK GIANNI PLUS TEST STRP strip TEST DAILY 100 Strip 0  
 aspirin delayed-release 81 mg tablet Take 1 Tab by mouth daily. 30 Tab 0  
 diclofenac (VOLTAREN) 1 % topical gel Apply 4 g to affected area four (4) times daily. 100 g 0 Allergies Allergen Reactions  Benicar [Olmesartan] Unable to [employer]  Pcn [Penicillins] Unable to [employer]  Simvastatin Myalgia  Uniretic [Moexipril-Hydrochlorothiazide] Other (comments) GI upset History reviewed. No pertinent family history. Social History Tobacco Use  Smoking status: Former Smoker Packs/day: 1.00 Years: 30.00 Pack years: 30.00 Last attempt to quit: 1992 Years since quittin.4  Smokeless tobacco: Never Used Substance Use Topics  Alcohol use: No  
 
Patient Active Problem List  
Diagnosis Code  Type 2 diabetes mellitus without complication, without long-term current use of insulin ([facility]) E11.9  Hypercholesterolemia E78.00  Benign essential HTN I10  Vertebral artery occlusion, unspecified laterality I65.09  
 Gastroesophageal reflux disease without esophagitis K21.9  Malignant neoplasm of urinary bladder ([facility]) C67.9 Depression Risk Factor Screening: PHQ over the last two weeks 2018 Little interest or pleasure in doing things Not at all Feeling down, depressed, irritable, or hopeless Not at all Total Score PHQ 2 0 Alcohol Risk Factor Screening: You do not drink alcohol or very rarely. Functional Ability and Level of Safety:  
Hearing Loss Hearing is good. Activities of Daily Living The home contains: no safety equipment. Patient does total self care Fall Risk Fall Risk Assessment, last 12 mths 11/1/2018 Able to walk? Yes Fall in past 12 months? No  
 
 
Abuse Screen Patient is not abused Cognitive Screening Evaluation of Cognitive Function: 
Has your family/caregiver stated any concerns about your memory: no 
Normal 
 
Patient Care Team  
Patient Care Team: 
Brad Orosco NP as PCP - General (Family Practice) Marla Willard MD (Urology) Assessment/Plan Education and counseling provided: 
Are appropriate based on today's review and evaluation Diagnoses and all orders for this visit: 
 
1. Medicare annual wellness visit, subsequent 2. Encounter for immunization 
-     INFLUENZA VACCINE INACTIVATED (IIV), SUBUNIT, ADJUVANTED, IM 
-     ADMIN INFLUENZA VIRUS VAC 3. Type 2 diabetes mellitus with hyperglycemia, without long-term current use of insulin (HCC) 
-     HEMOGLOBIN A1C WITH EAG 
 
4. Gastroesophageal reflux disease without esophagitis 
-     ondansetron (ZOFRAN ODT) 4 mg disintegrating tablet; Take 1 Tab by mouth every eight (8) hours as needed for Nausea. 5. Vertebral artery occlusion, unspecified laterality 6. Hypercholesterolemia -     LIPID PANEL 
-     METABOLIC PANEL, COMPREHENSIVE 
-     [place] HANDLG&/OR CONVEY OF SPEC FOR TR OFFICE TO LAB 7. Benign essential HTN 8. Dermatitis -     clotrimazole-betamethasone (LOTRISONE) topical cream; APPLY TO AFFECTED AREA TWO (2) TIMES A DAY. 9. Nausea 
-     ondansetron (ZOFRAN ODT) 4 mg disintegrating tablet; Take 1 Tab by mouth every eight (8) hours as needed for Nausea. 10. Malignant neoplasm of urinary bladder, unspecified site ([facility] Health Maintenance Due Topic Date Due  
 HEMOGLOBIN A1C Q6M  11/10/2018

## 2018-11-02 LAB
ALBUMIN SERPL-MCNC: 4.3 G/DL (ref 3.5–4.8)
ALBUMIN/GLOB SERPL: 1.7 {RATIO} (ref 1.2–2.2)
ALP SERPL-CCNC: 90 IU/L (ref 39–117)
ALT SERPL-CCNC: 34 IU/L (ref 0–44)
AST SERPL-CCNC: 30 IU/L (ref 0–40)
BILIRUB SERPL-MCNC: 0.5 MG/DL (ref 0–1.2)
BUN SERPL-MCNC: 21 MG/DL (ref 8–27)
BUN/CREAT SERPL: 20 (ref 10–24)
CALCIUM SERPL-MCNC: 8.9 MG/DL (ref 8.6–10.2)
CHLORIDE SERPL-SCNC: 102 MMOL/L (ref 96–106)
CHOLEST SERPL-MCNC: 102 MG/DL (ref 100–199)
CO2 SERPL-SCNC: 22 MMOL/L (ref 20–29)
CREAT SERPL-MCNC: 1.03 MG/DL (ref 0.76–1.27)
EST. AVERAGE GLUCOSE BLD GHB EST-MCNC: 148 MG/DL
GLOBULIN SER CALC-MCNC: 2.6 G/DL (ref 1.5–4.5)
GLUCOSE SERPL-MCNC: 84 MG/DL (ref 65–99)
HBA1C MFR BLD: 6.8 % (ref 4.8–5.6)
HDLC SERPL-MCNC: 37 MG/DL
INTERPRETATION, 910389: NORMAL
LDLC SERPL CALC-MCNC: 44 MG/DL (ref 0–99)
POTASSIUM SERPL-SCNC: 4.3 MMOL/L (ref 3.5–5.2)
PROT SERPL-MCNC: 6.9 G/DL (ref 6–8.5)
SODIUM SERPL-SCNC: 141 MMOL/L (ref 134–144)
TRIGL SERPL-MCNC: 107 MG/DL (ref 0–149)
VLDLC SERPL CALC-MCNC: 21 MG/DL (ref 5–40)

## 2018-11-12 DIAGNOSIS — E11.9 TYPE 2 DIABETES MELLITUS WITHOUT COMPLICATION, WITHOUT LONG-TERM CURRENT USE OF INSULIN (HCC): ICD-10-CM

## 2018-11-12 RX ORDER — COLESEVELAM 180 1/1
TABLET ORAL
Qty: 180 TAB | Refills: 1 | Status: SHIPPED | OUTPATIENT
Start: 2018-11-12 | End: 2019-01-07 | Stop reason: SDUPTHER

## 2018-11-12 RX ORDER — GLIMEPIRIDE 4 MG/1
4 TABLET ORAL 2 TIMES DAILY
Qty: 180 TAB | Refills: 1 | Status: SHIPPED | OUTPATIENT
Start: 2018-11-12 | End: 2019-03-14 | Stop reason: SDUPTHER

## 2018-11-12 NOTE — TELEPHONE ENCOUNTER
Pharmacy requesting medication refill 90 day supply     Requested Prescriptions     Pending Prescriptions Disp Refills    colesevelam (WELCHOL) 625 mg tablet 180 Tab 0     Sig: Take 3 tablets by mouth 2 times daily. Appointment due.     glimepiride (AMARYL) 4 mg tablet 180 Tab 0       Pharmacy on file verified

## 2018-11-23 DIAGNOSIS — E11.65 TYPE 2 DIABETES MELLITUS WITH HYPERGLYCEMIA, WITHOUT LONG-TERM CURRENT USE OF INSULIN (HCC): ICD-10-CM

## 2018-11-23 RX ORDER — METFORMIN HYDROCHLORIDE 1000 MG/1
TABLET ORAL
Qty: 180 TAB | Refills: 1 | Status: SHIPPED | OUTPATIENT
Start: 2018-11-23 | End: 2019-03-28 | Stop reason: SDUPTHER

## 2018-11-23 RX ORDER — CLOPIDOGREL BISULFATE 75 MG/1
75 TABLET ORAL DAILY
Qty: 90 TAB | Refills: 1 | Status: SHIPPED | OUTPATIENT
Start: 2018-11-23 | End: 2019-05-20 | Stop reason: SDUPTHER

## 2018-11-23 NOTE — TELEPHONE ENCOUNTER
Pharm on file verified, they are requesting a 90-days supply for the following meds,    LOV 11/01/18  Last refill. 10/30/2018    Requested Prescriptions     Pending Prescriptions Disp Refills    metFORMIN (GLUCOPHAGE) 1,000 mg tablet 60 Tab 0    clopidogrel (PLAVIX) 75 mg tab 30 Tab 0     Sig: Take 1 Tab by mouth daily. Appointment due.

## 2018-12-05 DIAGNOSIS — E66.3 OVERWEIGHT: ICD-10-CM

## 2018-12-05 DIAGNOSIS — R76.8 RHEUMATOID FACTOR POSITIVE: ICD-10-CM

## 2018-12-05 DIAGNOSIS — D49.4 BLADDER TUMOR: ICD-10-CM

## 2018-12-05 DIAGNOSIS — Z12.5 SCREENING FOR PROSTATE CANCER: ICD-10-CM

## 2018-12-05 DIAGNOSIS — I65.09 VERTEBRAL ARTERY OCCLUSION, UNSPECIFIED LATERALITY: ICD-10-CM

## 2018-12-06 RX ORDER — AMLODIPINE AND VALSARTAN 10; 320 MG/1; MG/1
TABLET ORAL
Qty: 30 TAB | Refills: 5 | Status: SHIPPED | OUTPATIENT
Start: 2018-12-06 | End: 2019-02-27 | Stop reason: SDUPTHER

## 2018-12-06 NOTE — TELEPHONE ENCOUNTER
Last visit and labs 11/01/18
Pharm on file verified . They are requesting a 90-days supply for the following meds.     LOV 11/01/18  Last refill. 08/22/18    Requested Prescriptions     Pending Prescriptions Disp Refills    amLODIPine-valsartan (EXFORGE)  mg per tablet 30 Tab 2
111

## 2019-01-07 DIAGNOSIS — E11.9 TYPE 2 DIABETES MELLITUS WITHOUT COMPLICATION, WITHOUT LONG-TERM CURRENT USE OF INSULIN (HCC): ICD-10-CM

## 2019-01-07 RX ORDER — COLESEVELAM 180 1/1
TABLET ORAL
Qty: 180 TAB | Refills: 1 | Status: SHIPPED | OUTPATIENT
Start: 2019-01-07 | End: 2019-03-04 | Stop reason: SDUPTHER

## 2019-01-29 DIAGNOSIS — K21.9 GASTROESOPHAGEAL REFLUX DISEASE WITHOUT ESOPHAGITIS: ICD-10-CM

## 2019-01-29 RX ORDER — LANSOPRAZOLE 30 MG/1
CAPSULE, DELAYED RELEASE ORAL
Qty: 30 CAP | Refills: 3 | Status: SHIPPED | OUTPATIENT
Start: 2019-01-29 | End: 2019-05-06 | Stop reason: ALTCHOICE

## 2019-02-18 NOTE — TELEPHONE ENCOUNTER
March 5, 2019      Devi Edwards  [address]  [place] [address]          Dear Devi,     While refilling your prescriptions today, we noticed that you are due to have labs drawn and for an office visit.  We will refill your prescription for 30 days, but a follow-up appointment must be made before any additional refills can be approved.     Please schedule a fasting lab appointment in the next week and than an office visit within in the next 30 days to review lab results and blood sugars.     Please check blood sugars three a day (before breakfast, supper and bedtime) for 4 days prior to the appointment with me and bring the results to the appointment.      Taking care of your health is important to us and we look forward to seeing you in the near future.  Please call us at [phone] or [phone] (or use [employer]) to schedule an appointment.     Please disregard this notice if you have already made an appointment.        Sincerely,      [facility]  Emanuel Mcclure MD           OV note of 5/9/17 and MRI from 9/30/17 faxed to Dr Larisa Cortez as requested.

## 2019-02-21 ENCOUNTER — DOCUMENTATION ONLY (OUTPATIENT)
Dept: INTERNAL MEDICINE CLINIC | Age: 79
End: 2019-02-21

## 2019-02-27 DIAGNOSIS — Z12.5 SCREENING FOR PROSTATE CANCER: ICD-10-CM

## 2019-02-27 DIAGNOSIS — D49.4 BLADDER TUMOR: ICD-10-CM

## 2019-02-27 DIAGNOSIS — E66.3 OVERWEIGHT: ICD-10-CM

## 2019-02-27 DIAGNOSIS — R76.8 RHEUMATOID FACTOR POSITIVE: ICD-10-CM

## 2019-02-27 DIAGNOSIS — I65.09 VERTEBRAL ARTERY OCCLUSION, UNSPECIFIED LATERALITY: ICD-10-CM

## 2019-02-27 RX ORDER — AMLODIPINE AND VALSARTAN 10; 320 MG/1; MG/1
TABLET ORAL
Qty: 30 TAB | Refills: 1 | Status: SHIPPED | OUTPATIENT
Start: 2019-02-27 | End: 2019-03-28 | Stop reason: SDUPTHER

## 2019-02-27 NOTE — TELEPHONE ENCOUNTER
Pharmacy requesting medication refill     Requested Prescriptions     Pending Prescriptions Disp Refills    amLODIPine-valsartan (EXFORGE)  mg per tablet 30 Tab 5     Sig: TAKE 1 [address] on file verified   [phone])

## 2019-03-14 DIAGNOSIS — E11.9 TYPE 2 DIABETES MELLITUS WITHOUT COMPLICATION, WITHOUT LONG-TERM CURRENT USE OF INSULIN (HCC): ICD-10-CM

## 2019-03-14 RX ORDER — GLIMEPIRIDE 4 MG/1
4 TABLET ORAL 2 TIMES DAILY
Qty: 180 TAB | Refills: 0 | Status: SHIPPED | OUTPATIENT
Start: 2019-03-14 | End: 2019-06-11 | Stop reason: SDUPTHER

## 2019-03-14 NOTE — TELEPHONE ENCOUNTER
Pharmacy requesting 90 day supply     Requested Prescriptions     Pending Prescriptions Disp Refills    glimepiride (AMARYL) 4 mg tablet 180 Tab 1     Sig: Take 1 Tab by mouth two (2) times a day.       Pharmacy on file verified  [phone])

## 2019-03-28 DIAGNOSIS — E66.3 OVERWEIGHT: ICD-10-CM

## 2019-03-28 DIAGNOSIS — I65.09 VERTEBRAL ARTERY OCCLUSION, UNSPECIFIED LATERALITY: ICD-10-CM

## 2019-03-28 DIAGNOSIS — Z12.5 SCREENING FOR PROSTATE CANCER: ICD-10-CM

## 2019-03-28 DIAGNOSIS — R76.8 RHEUMATOID FACTOR POSITIVE: ICD-10-CM

## 2019-03-28 DIAGNOSIS — E11.65 TYPE 2 DIABETES MELLITUS WITH HYPERGLYCEMIA, WITHOUT LONG-TERM CURRENT USE OF INSULIN (HCC): ICD-10-CM

## 2019-03-28 DIAGNOSIS — D49.4 BLADDER TUMOR: ICD-10-CM

## 2019-03-28 NOTE — TELEPHONE ENCOUNTER
Pharmacy requesting medication refill     Requested Prescriptions     Pending Prescriptions Disp Refills    amLODIPine-valsartan (EXFORGE)  mg per tablet 30 Tab 1     Sig: TAKE 1 TABLET BY MOUTH EVERY DAY    metFORMIN (GLUCOPHAGE) 1,000 mg tablet 180 Tab 1     Sig: TAKE 1 TABLET BY MOUTH TWICE A DAY WITH MEALS     Pharmacy on file verified  ([facility] [phone])

## 2019-03-31 RX ORDER — AMLODIPINE AND VALSARTAN 10; 320 MG/1; MG/1
TABLET ORAL
Qty: 30 TAB | Refills: 1 | Status: SHIPPED | OUTPATIENT
Start: 2019-03-31 | End: 2019-05-16 | Stop reason: SDUPTHER

## 2019-03-31 RX ORDER — METFORMIN HYDROCHLORIDE 1000 MG/1
TABLET ORAL
Qty: 180 TAB | Refills: 1 | Status: SHIPPED | OUTPATIENT
Start: 2019-03-31 | End: 2019-10-13 | Stop reason: SDUPTHER

## 2019-04-01 DIAGNOSIS — E11.9 TYPE 2 DIABETES MELLITUS WITHOUT COMPLICATION, WITHOUT LONG-TERM CURRENT USE OF INSULIN (HCC): ICD-10-CM

## 2019-04-01 RX ORDER — COLESEVELAM 180 1/1
TABLET ORAL
Qty: 180 TAB | Refills: 0 | Status: SHIPPED | OUTPATIENT
Start: 2019-04-01 | End: 2019-04-29 | Stop reason: SDUPTHER

## 2019-05-06 ENCOUNTER — OFFICE VISIT (OUTPATIENT)
Dept: FAMILY MEDICINE CLINIC | Age: 79
End: 2019-05-06

## 2019-05-06 VITALS
SYSTOLIC BLOOD PRESSURE: 118 MMHG | DIASTOLIC BLOOD PRESSURE: 58 MMHG | WEIGHT: 202 LBS | RESPIRATION RATE: 18 BRPM | TEMPERATURE: 97.8 F | BODY MASS INDEX: 29.92 KG/M2 | HEART RATE: 88 BPM | HEIGHT: 69 IN

## 2019-05-06 DIAGNOSIS — E78.00 HYPERCHOLESTEROLEMIA: ICD-10-CM

## 2019-05-06 DIAGNOSIS — I10 BENIGN ESSENTIAL HTN: ICD-10-CM

## 2019-05-06 DIAGNOSIS — K21.9 GASTROESOPHAGEAL REFLUX DISEASE WITHOUT ESOPHAGITIS: ICD-10-CM

## 2019-05-06 DIAGNOSIS — E11.9 TYPE 2 DIABETES MELLITUS WITHOUT COMPLICATION, WITHOUT LONG-TERM CURRENT USE OF INSULIN (HCC): Primary | ICD-10-CM

## 2019-05-06 DIAGNOSIS — C68.0 PRIMARY URETHRAL PAPILLARY CARCINOMA (HCC): ICD-10-CM

## 2019-05-06 NOTE — PROGRESS NOTES
Chief Complaint Patient presents with  Diabetes 6 month f/u  Hypertension 6 month f/u Pt presents in office today for 6 month follow up 1. Have you been to the ER, urgent care clinic since your last visit? Hospitalized since your last visit? No 
 
2. Have you seen or consulted any other health care providers outside of the [address] [place] [address] since your last visit? Include any pap smears or colon screening.  No

## 2019-05-06 NOTE — PROGRESS NOTES
HISTORY OF PRESENT ILLNESS Juanpablo Garcia is a [de-identified] y.o. male. HPI Follow up chronic health problems. T2DM. Taking prescribed meds. Has reduced sugar and processed carbs in diet. HTN. Interim BP's averaging 120-130/70-80. Adhering to medication regimen. Hypercholesterolemia. Taking welchol as prescribed. History of CVA with bilateral vertebral artery stenosis. Followed by neurology Dr. Javon Landa. Bladder cancer. Recurrent. Followed by urology. Recently completed chemotherapy. Had surgery 11/2018. Has follow up this month for further evaluation. GERD. Followed by Dr. Elba Yeung. Following FODMAP diet. Continues to have issues with indigestion and nausea. Past Medical History:  
Diagnosis Date  Calculus of kidney  Cerebral artery occlusion with cerebral infarction ([facility] [address] 10/2016  Elevated cholesterol 5/26/2010  Fracture of right ankle 5/26/2010  
 HBP (high blood pressure) 5/26/2010  
 NIDDM (non-insulin dependent diabetes mellitus) 05/26/2010  Occlusion of left vertebral artery 2016  Primary urethral papillary carcinoma ([facility] [address] 2014 Dr Rick Ireland  Skin cancer of face  Stenosis of right vertebral artery  Ureteral calculus 5/26/2010 Patient Active Problem List  
Diagnosis Code  Type 2 diabetes mellitus without complication, without long-term current use of insulin (HCC) E11.9  Hypercholesterolemia E78.00  Benign essential HTN I10  Vertebral artery occlusion, unspecified laterality I65.09  
 Gastroesophageal reflux disease without esophagitis K21.9  Primary urethral papillary carcinoma ([facility]) C68.0 Current Outpatient Medications Medication Sig  Wheat Dextrin (BENEFIBER CLEAR) 3 gram/3.5 gram pwpk Take  by mouth.  Bifidobacterium Infantis (ALIGN) 4 mg cap Take  by mouth.  alpha-d-galactosidase (BEANO) 150 unit tab tablet Take  by mouth.  colesevelam (WELCHOL) 625 mg tablet Take 3 tablets by mouth 2 times daily. Appointment due.  amLODIPine-valsartan (EXFORGE)  mg per tablet TAKE 1 TABLET BY MOUTH EVERY DAY  metFORMIN (GLUCOPHAGE) 1,000 mg tablet TAKE 1 TABLET BY MOUTH TWICE A DAY WITH MEALS  glimepiride (AMARYL) 4 mg tablet Take 1 Tab by mouth two (2) times a day.  clopidogrel (PLAVIX) 75 mg tab Take 1 Tab by mouth daily.  ondansetron (ZOFRAN ODT) 4 mg disintegrating tablet Take 1 Tab by mouth every eight (8) hours as needed for Nausea.  clotrimazole-betamethasone (LOTRISONE) topical cream APPLY TO AFFECTED AREA TWO (2) TIMES A DAY.  cholecalciferol (VITAMIN D3) 1,000 unit tablet Take  by mouth daily.  raNITIdine (ZANTAC) 150 mg tablet Take 150 mg by mouth two (2) times a day.  ACCU-CHEK GIANNI PLUS TEST STRP strip TEST DAILY  aspirin delayed-release 81 mg tablet Take 1 Tab by mouth daily.  diclofenac (VOLTAREN) 1 % topical gel Apply 4 g to affected area four (4) times daily. No current facility-administered medications for this visit. Social History Tobacco Use  Smoking status: Former Smoker Packs/day: 1.00 Years: 30.00 Pack years: 30.00 Last attempt to quit: 1992 Years since quittin.9  Smokeless tobacco: Never Used Substance Use Topics  Alcohol use: No  
 Drug use: No  
 
Visit Vitals /58 Pulse 88 Temp 97.8 °F (36.6 °C) (Oral) Resp 18 Ht 5' 9\" (1.753 m) Wt 202 lb (91.6 kg) BMI 29.83 kg/m² Review of Systems Constitutional: Negative for malaise/fatigue. Respiratory: Negative for cough and shortness of breath. Cardiovascular: Negative for chest pain, palpitations, claudication and leg swelling. Gastrointestinal: Positive for heartburn and nausea (occasional). Negative for abdominal pain, blood in stool and vomiting. Neurological: Negative for dizziness and headaches. All other systems reviewed and are negative. Physical Exam  
Constitutional: He is oriented to person, place, and time. No distress. Overweight. Neck: Neck supple. Cardiovascular: Normal rate, regular rhythm and normal heart sounds. Pulmonary/Chest: Effort normal and breath sounds normal.  
Abdominal: Soft. He exhibits no distension. There is no tenderness. There is no guarding. Musculoskeletal: Normal range of motion. He exhibits no edema. Lymphadenopathy:  
  He has no cervical adenopathy. Neurological: He is alert and oriented to person, place, and time. Coordination normal.  
Skin: Skin is warm and dry. Psychiatric: He has a normal mood and affect. Diabetic foot exam:  
 
Left Foot: 
 Visual Exam: normal  
 Pulse DP: 2+ (normal) Filament test: normal sensation Vibratory sensation: normal 
   
Right Foot: 
 Visual Exam: normal  
 Pulse DP: 2+ (normal) Filament test: normal sensation Vibratory sensation: normal 
 
 
ASSESSMENT and PLAN Diagnoses and all orders for this visit: 
 
1. Type 2 diabetes mellitus without complication, without long-term current use of insulin (HCC) 
-     HEMOGLOBIN A1C WITH EAG 
-     MICROALBUMIN, UR, RAND W/ MICROALB/CREAT RATIO 
-      DIABETES FOOT EXAM 
Controlled based on last A1C. Reviewed diabetic diet and carbohydrate restriction. 2. Hypercholesterolemia -     LIPID PANEL 
-     METABOLIC PANEL, COMPREHENSIVE 
-     [place] HANDLG&/OR CONVEY OF SPEC FOR TR OFFICE TO LAB 
-     COLLECTION VENOUS BLOOD,VENIPUNCTURE Reviewed healthy diet and exercise recommendations. Fasting labs sent. 3. Benign essential HTN Well controlled. Continue current treatment. 4. BMI 29.0-29.9,adult Weight loss recommendations given. 5. Gastroesophageal reflux disease without esophagitis Symptoms stable. Follow up as scheduled with Dr. Vilma Bourgeois. 6. Primary urethral papillary carcinoma ([facility] [address] Follow up as scheduled with urology. Follow up 6 months or sooner prn.

## 2019-05-07 LAB
ALBUMIN SERPL-MCNC: 4.6 G/DL (ref 3.5–4.7)
ALBUMIN/CREAT UR: 5.7 MG/G CREAT (ref 0–30)
ALBUMIN/GLOB SERPL: 2.2 {RATIO} (ref 1.2–2.2)
ALP SERPL-CCNC: 87 IU/L (ref 39–117)
ALT SERPL-CCNC: 24 IU/L (ref 0–44)
AST SERPL-CCNC: 26 IU/L (ref 0–40)
BILIRUB SERPL-MCNC: 0.5 MG/DL (ref 0–1.2)
BUN SERPL-MCNC: 17 MG/DL (ref 8–27)
BUN/CREAT SERPL: 18 (ref 10–24)
CALCIUM SERPL-MCNC: 9.1 MG/DL (ref 8.6–10.2)
CHLORIDE SERPL-SCNC: 106 MMOL/L (ref 96–106)
CHOLEST SERPL-MCNC: 97 MG/DL (ref 100–199)
CO2 SERPL-SCNC: 25 MMOL/L (ref 20–29)
CREAT SERPL-MCNC: 0.97 MG/DL (ref 0.76–1.27)
CREAT UR-MCNC: 140.9 MG/DL
EST. AVERAGE GLUCOSE BLD GHB EST-MCNC: 134 MG/DL
GLOBULIN SER CALC-MCNC: 2.1 G/DL (ref 1.5–4.5)
GLUCOSE SERPL-MCNC: 76 MG/DL (ref 65–99)
HBA1C MFR BLD: 6.3 % (ref 4.8–5.6)
HDLC SERPL-MCNC: 39 MG/DL
INTERPRETATION, 910389: NORMAL
LDLC SERPL CALC-MCNC: 41 MG/DL (ref 0–99)
MICROALBUMIN UR-MCNC: 8.1 UG/ML
POTASSIUM SERPL-SCNC: 4.5 MMOL/L (ref 3.5–5.2)
PROT SERPL-MCNC: 6.7 G/DL (ref 6–8.5)
SODIUM SERPL-SCNC: 141 MMOL/L (ref 134–144)
TRIGL SERPL-MCNC: 86 MG/DL (ref 0–149)
VLDLC SERPL CALC-MCNC: 17 MG/DL (ref 5–40)

## 2019-05-16 DIAGNOSIS — E66.3 OVERWEIGHT: ICD-10-CM

## 2019-05-16 DIAGNOSIS — R76.8 RHEUMATOID FACTOR POSITIVE: ICD-10-CM

## 2019-05-16 DIAGNOSIS — D49.4 BLADDER TUMOR: ICD-10-CM

## 2019-05-16 DIAGNOSIS — Z12.5 SCREENING FOR PROSTATE CANCER: ICD-10-CM

## 2019-05-16 DIAGNOSIS — I65.09 VERTEBRAL ARTERY OCCLUSION, UNSPECIFIED LATERALITY: ICD-10-CM

## 2019-05-16 RX ORDER — AMLODIPINE AND VALSARTAN 10; 320 MG/1; MG/1
TABLET ORAL
Qty: 90 TAB | Refills: 1 | Status: SHIPPED | OUTPATIENT
Start: 2019-05-16 | End: 2019-11-16 | Stop reason: SDUPTHER

## 2019-05-16 NOTE — TELEPHONE ENCOUNTER
Pharmacy requesting medication refill 90 day supply     Requested Prescriptions     Pending Prescriptions Disp Refills    amLODIPine-valsartan (EXFORGE)  mg per tablet 30 Tab 1     Sig: TAKE 1 [address] on file verified  [phone])

## 2019-05-20 RX ORDER — CLOPIDOGREL BISULFATE 75 MG/1
TABLET ORAL
Qty: 90 TAB | Refills: 1 | Status: SHIPPED | OUTPATIENT
Start: 2019-05-20 | End: 2019-11-10 | Stop reason: SDUPTHER

## 2019-05-21 RX ORDER — BLOOD SUGAR DIAGNOSTIC
STRIP MISCELLANEOUS
Qty: 100 STRIP | Refills: 0 | Status: SHIPPED | OUTPATIENT
Start: 2019-05-21 | End: 2019-08-20 | Stop reason: SDUPTHER

## 2019-05-28 DIAGNOSIS — E11.9 TYPE 2 DIABETES MELLITUS WITHOUT COMPLICATION, WITHOUT LONG-TERM CURRENT USE OF INSULIN (HCC): ICD-10-CM

## 2019-05-28 NOTE — TELEPHONE ENCOUNTER
Pharmacy requesting 90 day supply    Current medication colesevelam (WELCHOL) 625 mg tablet  Medication was submitted to pharmacy on 05/21/2019 for 180 tab and 2 refills     Pharmacy on file verified  ([facility] [phone])    Pharmacy Comments:  May we get a 90 day supply please- Cheaper for INS Copay

## 2019-05-29 RX ORDER — COLESEVELAM 180 1/1
TABLET ORAL
Qty: 540 TAB | Refills: 1 | Status: SHIPPED | OUTPATIENT
Start: 2019-05-29 | End: 2019-12-16 | Stop reason: SDUPTHER

## 2019-06-11 DIAGNOSIS — E11.9 TYPE 2 DIABETES MELLITUS WITHOUT COMPLICATION, WITHOUT LONG-TERM CURRENT USE OF INSULIN (HCC): ICD-10-CM

## 2019-06-11 RX ORDER — GLIMEPIRIDE 4 MG/1
TABLET ORAL
Qty: 180 TAB | Refills: 0 | Status: SHIPPED | OUTPATIENT
Start: 2019-06-11 | End: 2019-09-05 | Stop reason: SDUPTHER

## 2019-08-20 RX ORDER — BLOOD SUGAR DIAGNOSTIC
STRIP MISCELLANEOUS
Qty: 100 STRIP | Refills: 0 | Status: SHIPPED | OUTPATIENT
Start: 2019-08-20 | End: 2019-11-12 | Stop reason: SDUPTHER

## 2019-09-05 DIAGNOSIS — E11.9 TYPE 2 DIABETES MELLITUS WITHOUT COMPLICATION, WITHOUT LONG-TERM CURRENT USE OF INSULIN (HCC): ICD-10-CM

## 2019-09-05 RX ORDER — GLIMEPIRIDE 4 MG/1
TABLET ORAL
Qty: 180 TAB | Refills: 0 | Status: SHIPPED | OUTPATIENT
Start: 2019-09-05 | End: 2019-10-26 | Stop reason: SDUPTHER

## 2019-10-22 ENCOUNTER — TELEPHONE (OUTPATIENT)
Dept: FAMILY MEDICINE CLINIC | Age: 79
End: 2019-10-22

## 2019-10-22 NOTE — TELEPHONE ENCOUNTER
----- Message from [employer] sent at 10/22/2019  4:43 PM EDT -----  Regarding: [place]p.[place]/Telephone   General Message/Vendor Calls    Caller's first and last name:Pt       Reason for call:Flu shot       Callback required yes/no and why:No      Best contact number(s):[phone]      Details to clarify the request:Pt advising the Dr he would like to receive the flu shot at the appointment on 10/24/19 at [address] [facility]

## 2019-10-24 ENCOUNTER — OFFICE VISIT (OUTPATIENT)
Dept: FAMILY MEDICINE CLINIC | Age: 79
End: 2019-10-24

## 2019-10-24 VITALS
TEMPERATURE: 97.5 F | WEIGHT: 197 LBS | DIASTOLIC BLOOD PRESSURE: 70 MMHG | HEART RATE: 65 BPM | SYSTOLIC BLOOD PRESSURE: 120 MMHG | HEIGHT: 69 IN | RESPIRATION RATE: 16 BRPM | OXYGEN SATURATION: 95 % | BODY MASS INDEX: 29.18 KG/M2

## 2019-10-24 DIAGNOSIS — C68.0 PRIMARY URETHRAL PAPILLARY CARCINOMA (HCC): ICD-10-CM

## 2019-10-24 DIAGNOSIS — Z23 ENCOUNTER FOR IMMUNIZATION: ICD-10-CM

## 2019-10-24 DIAGNOSIS — E78.00 HYPERCHOLESTEROLEMIA: ICD-10-CM

## 2019-10-24 DIAGNOSIS — I65.09 VERTEBRAL ARTERY OCCLUSION, UNSPECIFIED LATERALITY: ICD-10-CM

## 2019-10-24 DIAGNOSIS — K21.9 GASTROESOPHAGEAL REFLUX DISEASE WITHOUT ESOPHAGITIS: ICD-10-CM

## 2019-10-24 DIAGNOSIS — E11.9 TYPE 2 DIABETES MELLITUS WITHOUT COMPLICATION, WITHOUT LONG-TERM CURRENT USE OF INSULIN (HCC): Primary | ICD-10-CM

## 2019-10-24 DIAGNOSIS — I10 BENIGN ESSENTIAL HTN: ICD-10-CM

## 2019-10-24 RX ORDER — CEFUROXIME AXETIL 500 MG/1
TABLET ORAL
Refills: 0 | COMMUNITY
Start: 2019-08-05 | End: 2019-10-24 | Stop reason: ALTCHOICE

## 2019-10-24 NOTE — PATIENT INSTRUCTIONS
Vaccine Information Statement    Influenza (Flu) Vaccine (Inactivated or Recombinant): What You Need to Know    Many Vaccine Information Statements are available in English and other languages. See www.immunize.org/vis  Hojas de información sobre vacunas están disponibles en español y en muchos otros idiomas. Visite www.immunize.org/vis    1. Why get vaccinated? Influenza vaccine can prevent influenza (flu). Flu is a contagious disease that spreads around the [facility] every year, usually between October and May. Anyone can get the flu, but it is more dangerous for some people. Infants and young children, people 72years of age and older, pregnant women, and people with certain health conditions or a weakened immune system are at greatest risk of flu complications. Pneumonia, bronchitis, sinus infections and ear infections are examples of flu-related complications. If you have a medical condition, such as heart disease, cancer or diabetes, flu can make it worse. Flu can cause fever and chills, sore throat, muscle aches, fatigue, cough, headache, and runny or stuffy nose. Some people may have vomiting and diarrhea, though this is more common in children than adults. Each year thousands of people in the [facility] die from flu, and many more are hospitalized. Flu vaccine prevents millions of illnesses and flu-related visits to the doctor each year. 2. Influenza vaccines     CDC recommends everyone 10months of age and older get vaccinated every flu season. Children 6 months through 6years of age may need 2 doses during a single flu season. Everyone else needs only 1 dose each flu season. It takes about 2 weeks for protection to develop after vaccination. There are many flu viruses, and they are always changing. Each year a new flu vaccine is made to protect against three or four viruses that are likely to cause disease in the upcoming flu season.  Even when the vaccine doesnt exactly match these viruses, it may still provide some protection. Influenza vaccine does not cause flu. Influenza vaccine may be given at the same time as other vaccines. 3. Talk with your health care provider    Tell your vaccine provider if the person getting the vaccine:   Has had an allergic reaction after a previous dose of influenza vaccine, or has any severe, life-threatening allergies.  Has ever had Guillain-Barré Syndrome (also called GBS). In some cases, your health care provider may decide to postpone influenza vaccination to a future visit. People with minor illnesses, such as a cold, may be vaccinated. People who are moderately or severely ill should usually wait until they recover before getting influenza vaccine. Your health care provider can give you more information. 4. Risks of a reaction     Soreness, redness, and swelling where shot is given, fever, muscle aches, and headache can happen after influenza vaccine.  There may be a very small increased risk of Guillain-Barré Syndrome (GBS) after inactivated influenza vaccine (the flu shot). Sergey Mccartney children who get the flu shot along with pneumococcal vaccine (PCV13), and/or DTaP vaccine at the same time might be slightly more likely to have a seizure caused by fever. Tell your health care provider if a child who is getting flu vaccine has ever had a seizure. People sometimes faint after medical procedures, including vaccination. Tell your provider if you feel dizzy or have vision changes or ringing in the ears. As with any medicine, there is a very remote chance of a vaccine causing a severe allergic reaction, other serious injury, or death. 5. What if there is a serious problem? An allergic reaction could occur after the vaccinated person leaves the clinic.  If you see signs of a severe allergic reaction (hives, swelling of the face and throat, difficulty breathing, a fast heartbeat, dizziness, or weakness), call 9-1-1 and get the person to the nearest hospital.    For other signs that concern you, call your health care provider. Adverse reactions should be reported to the Vaccine Adverse Event Reporting System (VAERS). Your health care provider will usually file this report, or you can do it yourself. Visit the VAERS website at www.vaers. [facility].gov or call 3-878.741.5448. VAERS is only for reporting reactions, and VAERS staff do not give medical advice. 6. The National Vaccine Injury Compensation Program    The [facility] Vaccine Injury Compensation Program (VICP) is a federal program that was created to compensate people who may have been injured by certain vaccines. Visit the VICP website at www.[facility].gov/vaccinecompensation or call 9-474.927.4688 to learn about the program and about filing a claim. There is a time limit to file a claim for compensation. 7. How can I learn more?  Ask your health care provider.  Call your local or state health department.  Contact the Centers for Disease Control and Prevention (CDC):  - Call 7-133.557.2368 (1-800-CDC-INFO) or  - Visit CDCs influenza website at www.cdc.gov/flu    Vaccine Information Statement (Interim)  Inactivated Influenza Vaccine   8/15/2019  42 ANUJA Soriano [id number]   Department of Health and Human Services  Centers for Disease Control and Prevention    Office Use Only

## 2019-10-24 NOTE — PROGRESS NOTES
Chief Complaint   Patient presents with    Diabetes     follow up , fasting     Hypertension    Cholesterol Problem    Immunization/Injection     Influenza High dose      1. Have you been to the ER, urgent care clinic since your last visit? Hospitalized since your last visit? No    2. Have you seen or consulted any other health care providers outside of the [address] [place] [address] since your last visit? Include any pap smears or colon screening. No      Jhonathan Munoz is a [de-identified] y.o. male who presents for routine Influenza High dose immunizations. He denies any symptoms , reactions or allergies that would exclude them from being immunized today. Risks and adverse reactions were discussed and the VIS was given to them. All questions were addressed. He was observed for 10 min post injection. There were no reactions observed.     Bala Pandya LPN

## 2019-10-24 NOTE — PROGRESS NOTES
HISTORY OF PRESENT ILLNESS  Monique Her is a [de-identified] y.o. male. HPI  Six month follow up chronic health problems. Hypercholesterolemia. Taking welchol as prescribed. Following healthy diet, limited exercise. HTN. Adhering to medication regimen.  T2DM. Taking meds as prescribed. Reports he has had a few instances where blood sugar has gone down to 60 range. Has been following a FODMOP diet and lost about 5 lbs. History of vertebral artery occlusion. Taking plavix as prescribed. Bladder cancer. Completed round of chemo about 2 weeks ago. GERD. Under care of Dr. Chavez Sharp. Continues to have intermittent nausea. On probiotics and zofran.   Past Medical History:   Diagnosis Date    Bladder cancer ([facility] [address] 11/2018    Dr. Miguelito Bustillos    Calculus of kidney     [facility]    Elevated cholesterol 5/26/2010    Fracture of right ankle 5/26/2010    HBP (high blood pressure) 5/26/2010    NIDDM (non-insulin dependent diabetes mellitus) 05/26/2010    Occlusion of left vertebral artery 2016    Primary urethral papillary carcinoma (HCC) 2014    Dr Elizabeth Sears Skin cancer of face     Stenosis of right vertebral artery     Ureteral calculus 5/26/2010     Patient Active Problem List   Diagnosis Code    Type 2 diabetes mellitus without complication, without long-term current use of insulin (HCC) E11.9    Hypercholesterolemia E78.00    Benign essential HTN I10    Vertebral artery occlusion, unspecified laterality I65.09    Gastroesophageal reflux disease without esophagitis K21.9    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     Current Outpatient Medications   Medication Sig    metFORMIN (GLUCOPHAGE) 1,000 mg tablet TAKE 1 TABLET BY MOUTH TWICE A DAY WITH MEALS    glimepiride (AMARYL) 4 mg tablet TAKE 1 TABLET BY MOUTH TWICE A DAY    ACCU-CHEK GIANNI PLUS TEST STRP strip TEST DAILY AS DIRECTED    colesevelam (WELCHOL) 625 mg tablet TAKE 3 TABLETS BY MOUTH 2 TIMES DAILY.  clopidogrel (PLAVIX) 75 mg tab TAKE 1 TABLET BY MOUTH EVERY DAY    amLODIPine-valsartan (EXFORGE)  mg per tablet TAKE 1 TABLET BY MOUTH EVERY DAY    Wheat Dextrin (BENEFIBER CLEAR) 3 gram/3.5 gram pwpk Take  by mouth.  Bifidobacterium Infantis (ALIGN) 4 mg cap Take  by mouth.  alpha-d-galactosidase (BEANO) 150 unit tab tablet Take  by mouth.  ondansetron (ZOFRAN ODT) 4 mg disintegrating tablet Take 1 Tab by mouth every eight (8) hours as needed for Nausea.  clotrimazole-betamethasone (LOTRISONE) topical cream APPLY TO AFFECTED AREA TWO (2) TIMES A DAY.  cholecalciferol (VITAMIN D3) 1,000 unit tablet Take  by mouth daily.  raNITIdine (ZANTAC) 150 mg tablet Take 150 mg by mouth two (2) times a day.  aspirin delayed-release 81 mg tablet Take 1 Tab by mouth daily.  diclofenac (VOLTAREN) 1 % topical gel Apply 4 g to affected area four (4) times daily. No current facility-administered medications for this visit. Social History     Tobacco Use    Smoking status: Former Smoker     Packs/day: 1.00     Years: 30.00     Pack years: 30.00     Last attempt to quit: 1992     Years since quittin.4    Smokeless tobacco: Never Used   Substance Use Topics    Alcohol use: No    Drug use: No     Blood pressure 120/70, pulse 65, temperature 97.5 °F (36.4 °C), temperature source Oral, resp. rate 16, height 5' 9\" (1.753 m), weight 197 lb (89.4 kg), SpO2 95 %. Review of Systems   Constitutional: Negative. Respiratory: Negative for cough and shortness of breath. Cardiovascular: Negative for chest pain, palpitations and leg swelling. Gastrointestinal: Positive for nausea. Negative for abdominal pain, blood in stool and vomiting. Neurological: Positive for dizziness (intermitent since CVA). Negative for sensory change, focal weakness, seizures and headaches. All other systems reviewed and are negative.       Physical Exam Constitutional: No distress. Neck: Neck supple. Cardiovascular: Normal rate, regular rhythm and normal heart sounds. Pulmonary/Chest: Effort normal and breath sounds normal.   Abdominal: Soft. Bowel sounds are normal. He exhibits no distension. There is no tenderness. There is no guarding. Musculoskeletal: He exhibits no edema. Lymphadenopathy:     He has no cervical adenopathy. Neurological: He is alert. Coordination normal.   Skin: Skin is warm and dry. Psychiatric: He has a normal mood and affect. ASSESSMENT and PLAN  Diagnoses and all orders for this visit:    1. Type 2 diabetes mellitus without complication, without long-term current use of insulin (HCC)  -     HEMOGLOBIN A1C WITH EAG  Controlled based on last A1C. Having lower FBS than previous. Consider reducing medication dosage if A1C less than 6.0.    2. Hypercholesterolemia  -     LIPID PANEL  -     METABOLIC PANEL, COMPREHENSIVE  -     SD HANDLG&/OR CONVEY OF SPEC FOR TR OFFICE TO LAB  -     COLLECTION VENOUS BLOOD,VENIPUNCTURE  Reviewed healthy diet and exercise recommendations. Fasting labs sent. 3. Primary urethral papillary carcinoma ([facility] [address]  Follow up as scheduled with oncologistt/urologist.    4. Benign essential HTN  Well controlled. Continue current treatment. 5. Vertebral artery occlusion, unspecified laterality  Condition stable. Continue plavix. 6. Gastroesophageal reflux disease without esophagitis  Nausea persisting. Continue current treatment. Follow up with Dr. Cris Valdse as scheduled. 7. Encounter for immunization  -     INFLUENZA VACCINE INACTIVATED (IIV), SUBUNIT, ADJUVANTED, IM  -     ADMIN INFLUENZA VIRUS VAC    Follow up 6 months or sooner prn.

## 2019-10-25 LAB
ALBUMIN SERPL-MCNC: 4.4 G/DL (ref 3.5–4.7)
ALBUMIN/GLOB SERPL: 2 {RATIO} (ref 1.2–2.2)
ALP SERPL-CCNC: 73 IU/L (ref 39–117)
ALT SERPL-CCNC: 18 IU/L (ref 0–44)
AST SERPL-CCNC: 23 IU/L (ref 0–40)
BILIRUB SERPL-MCNC: 0.5 MG/DL (ref 0–1.2)
BUN SERPL-MCNC: 17 MG/DL (ref 8–27)
BUN/CREAT SERPL: 18 (ref 10–24)
CALCIUM SERPL-MCNC: 9 MG/DL (ref 8.6–10.2)
CHLORIDE SERPL-SCNC: 107 MMOL/L (ref 96–106)
CHOLEST SERPL-MCNC: 108 MG/DL (ref 100–199)
CO2 SERPL-SCNC: 22 MMOL/L (ref 20–29)
CREAT SERPL-MCNC: 0.94 MG/DL (ref 0.76–1.27)
EST. AVERAGE GLUCOSE BLD GHB EST-MCNC: 117 MG/DL
GLOBULIN SER CALC-MCNC: 2.2 G/DL (ref 1.5–4.5)
GLUCOSE SERPL-MCNC: 59 MG/DL (ref 65–99)
HBA1C MFR BLD: 5.7 % (ref 4.8–5.6)
HDLC SERPL-MCNC: 49 MG/DL
INTERPRETATION, 910389: NORMAL
LDLC SERPL CALC-MCNC: 45 MG/DL (ref 0–99)
POTASSIUM SERPL-SCNC: 4.2 MMOL/L (ref 3.5–5.2)
PROT SERPL-MCNC: 6.6 G/DL (ref 6–8.5)
SODIUM SERPL-SCNC: 145 MMOL/L (ref 134–144)
TRIGL SERPL-MCNC: 71 MG/DL (ref 0–149)
VLDLC SERPL CALC-MCNC: 14 MG/DL (ref 5–40)

## 2019-10-26 DIAGNOSIS — E11.9 TYPE 2 DIABETES MELLITUS WITHOUT COMPLICATION, WITHOUT LONG-TERM CURRENT USE OF INSULIN (HCC): ICD-10-CM

## 2019-10-26 RX ORDER — GLIMEPIRIDE 4 MG/1
4 TABLET ORAL DAILY
Qty: 90 TAB | Refills: 0
Start: 2019-10-26 | End: 2019-12-02 | Stop reason: SDUPTHER

## 2019-10-26 RX ORDER — METFORMIN HYDROCHLORIDE 500 MG/1
500 TABLET, EXTENDED RELEASE ORAL 2 TIMES DAILY
Qty: 180 TAB | Refills: 0 | Status: SHIPPED | OUTPATIENT
Start: 2019-10-26 | End: 2020-01-08 | Stop reason: SDUPTHER

## 2019-11-05 DIAGNOSIS — L30.9 DERMATITIS: ICD-10-CM

## 2019-11-05 RX ORDER — CLOTRIMAZOLE AND BETAMETHASONE DIPROPIONATE 10; .64 MG/G; MG/G
CREAM TOPICAL
Qty: 60 G | Refills: 0 | Status: SHIPPED | OUTPATIENT
Start: 2019-11-05 | End: 2020-06-16

## 2019-11-11 RX ORDER — CLOPIDOGREL BISULFATE 75 MG/1
TABLET ORAL
Qty: 90 TAB | Refills: 1 | Status: SHIPPED | OUTPATIENT
Start: 2019-11-11 | End: 2020-05-07

## 2019-11-12 RX ORDER — BLOOD SUGAR DIAGNOSTIC
STRIP MISCELLANEOUS
Qty: 100 STRIP | Refills: 0 | Status: SHIPPED | OUTPATIENT
Start: 2019-11-12

## 2019-11-16 DIAGNOSIS — R76.8 RHEUMATOID FACTOR POSITIVE: ICD-10-CM

## 2019-11-16 DIAGNOSIS — I65.09 VERTEBRAL ARTERY OCCLUSION, UNSPECIFIED LATERALITY: ICD-10-CM

## 2019-11-16 DIAGNOSIS — D49.4 BLADDER TUMOR: ICD-10-CM

## 2019-11-16 DIAGNOSIS — E66.3 OVERWEIGHT: ICD-10-CM

## 2019-11-16 DIAGNOSIS — Z12.5 SCREENING FOR PROSTATE CANCER: ICD-10-CM

## 2019-11-18 RX ORDER — AMLODIPINE AND VALSARTAN 10; 320 MG/1; MG/1
TABLET ORAL
Qty: 90 TAB | Refills: 1 | Status: SHIPPED | OUTPATIENT
Start: 2019-11-18 | End: 2020-05-14

## 2019-12-02 DIAGNOSIS — E11.9 TYPE 2 DIABETES MELLITUS WITHOUT COMPLICATION, WITHOUT LONG-TERM CURRENT USE OF INSULIN (HCC): ICD-10-CM

## 2019-12-02 RX ORDER — GLIMEPIRIDE 4 MG/1
TABLET ORAL
Qty: 180 TAB | Refills: 0 | Status: SHIPPED | OUTPATIENT
Start: 2019-12-02 | End: 2020-03-02

## 2019-12-16 DIAGNOSIS — E11.9 TYPE 2 DIABETES MELLITUS WITHOUT COMPLICATION, WITHOUT LONG-TERM CURRENT USE OF INSULIN (HCC): ICD-10-CM

## 2019-12-17 RX ORDER — COLESEVELAM 180 1/1
TABLET ORAL
Qty: 540 TAB | Refills: 1 | Status: SHIPPED | OUTPATIENT
Start: 2019-12-17 | End: 2020-06-09

## 2020-01-08 DIAGNOSIS — E11.9 TYPE 2 DIABETES MELLITUS WITHOUT COMPLICATION, WITHOUT LONG-TERM CURRENT USE OF INSULIN (HCC): ICD-10-CM

## 2020-01-08 RX ORDER — METFORMIN HYDROCHLORIDE 500 MG/1
500 TABLET, EXTENDED RELEASE ORAL 2 TIMES DAILY
Qty: 180 TAB | Refills: 0 | Status: SHIPPED | OUTPATIENT
Start: 2020-01-08 | End: 2020-04-03

## 2020-03-02 DIAGNOSIS — E11.9 TYPE 2 DIABETES MELLITUS WITHOUT COMPLICATION, WITHOUT LONG-TERM CURRENT USE OF INSULIN (HCC): ICD-10-CM

## 2020-03-02 RX ORDER — GLIMEPIRIDE 4 MG/1
TABLET ORAL
Qty: 180 TAB | Refills: 0 | Status: SHIPPED | OUTPATIENT
Start: 2020-03-02 | End: 2020-05-26

## 2020-04-02 ENCOUNTER — TELEPHONE (OUTPATIENT)
Dept: FAMILY MEDICINE CLINIC | Age: 80
End: 2020-04-02

## 2020-04-02 DIAGNOSIS — E11.9 TYPE 2 DIABETES MELLITUS WITHOUT COMPLICATION, WITHOUT LONG-TERM CURRENT USE OF INSULIN (HCC): ICD-10-CM

## 2020-04-02 NOTE — TELEPHONE ENCOUNTER
Chief Complaint   Patient presents with    Rescheduled Appointment     reschedule appointment for Monday, April 6, 2020 at 8:30 am to a virtual visit.        Left message for patient to return call to office to get set up for virtual.

## 2020-04-02 NOTE — TELEPHONE ENCOUNTER
----- Message from Letitia Chaudhry sent at 4/2/2020  1:40 PM EDT -----  Regarding: [facility]  Contact: [phone]  Message: Pt returned the call to office. Pt advised that he was unable to make the message out that was left.    Best Contact:[phone]

## 2020-04-03 RX ORDER — METFORMIN HYDROCHLORIDE 500 MG/1
TABLET, EXTENDED RELEASE ORAL
Qty: 180 TAB | Refills: 0 | Status: SHIPPED | OUTPATIENT
Start: 2020-04-03 | End: 2020-07-09

## 2020-04-03 NOTE — TELEPHONE ENCOUNTER
Patient was called and name and  verified. Patient informed me that he has been scheduled for a Tele med visit for 2020 at 2:00 pm.  Patient needed to be rescheduled for a virtual on the same day 2020 not moved.   Roshni Sher LPN

## 2020-05-24 DIAGNOSIS — E11.9 TYPE 2 DIABETES MELLITUS WITHOUT COMPLICATION, WITHOUT LONG-TERM CURRENT USE OF INSULIN (HCC): ICD-10-CM

## 2020-05-26 RX ORDER — GLIMEPIRIDE 4 MG/1
TABLET ORAL
Qty: 180 TAB | Refills: 0 | Status: SHIPPED | OUTPATIENT
Start: 2020-05-26 | End: 2020-08-19

## 2020-06-16 DIAGNOSIS — R11.0 NAUSEA: ICD-10-CM

## 2020-06-16 DIAGNOSIS — K21.9 GASTROESOPHAGEAL REFLUX DISEASE WITHOUT ESOPHAGITIS: ICD-10-CM

## 2020-06-16 DIAGNOSIS — L30.9 DERMATITIS: ICD-10-CM

## 2020-06-16 RX ORDER — ONDANSETRON 4 MG/1
TABLET, ORALLY DISINTEGRATING ORAL
Qty: 30 TAB | Refills: 0 | Status: SHIPPED | OUTPATIENT
Start: 2020-06-16 | End: 2020-12-21

## 2020-06-16 RX ORDER — CLOTRIMAZOLE AND BETAMETHASONE DIPROPIONATE 10; .64 MG/G; MG/G
CREAM TOPICAL
Qty: 60 G | Refills: 0 | Status: SHIPPED | OUTPATIENT
Start: 2020-06-16 | End: 2020-12-21

## 2020-06-16 NOTE — TELEPHONE ENCOUNTER
Patient request for new rx for voltaren. Thanks, Antolin Abreu    Last Visit: 10/24/19  NP Sharonda  Next Appointment: 6/29/20  NP Sharonda  Previous Refill Encounter(s): 1/8/16  100g    Requested Prescriptions     Pending Prescriptions Disp Refills    diclofenac (VOLTAREN) 1 % gel 100 g 0     Sig: Apply 4 g to affected area four (4) times daily.

## 2020-06-17 RX ORDER — DICLOFENAC SODIUM 10 MG/G
4 GEL TOPICAL 4 TIMES DAILY
Qty: 100 G | Refills: 0 | Status: SHIPPED | OUTPATIENT
Start: 2020-06-17

## 2020-06-19 ENCOUNTER — TELEPHONE (OUTPATIENT)
Dept: FAMILY MEDICINE CLINIC | Age: 80
End: 2020-06-19

## 2020-06-19 NOTE — TELEPHONE ENCOUNTER
Chief Complaint   Patient presents with    Prior Auth     DICLOFENAC SODIUM 1% GEL     Prior Auth     DICLOFENAC SODIUM 1% GEL :  APPROVED- MARCH 21, 2020 THROUGH JUNE 19, 2021     Approval notification faxed to [facility] pharmacy at [phone] with confirmation received. Ingrid Recio LPN     Patient informed via mychart.   Ingrid Recio LPN

## 2020-07-02 ENCOUNTER — OFFICE VISIT (OUTPATIENT)
Dept: FAMILY MEDICINE CLINIC | Age: 80
End: 2020-07-02

## 2020-07-02 VITALS
TEMPERATURE: 97.7 F | OXYGEN SATURATION: 96 % | HEART RATE: 67 BPM | WEIGHT: 204 LBS | HEIGHT: 69 IN | BODY MASS INDEX: 30.21 KG/M2 | RESPIRATION RATE: 16 BRPM | SYSTOLIC BLOOD PRESSURE: 138 MMHG | DIASTOLIC BLOOD PRESSURE: 82 MMHG

## 2020-07-02 DIAGNOSIS — I65.09 VERTEBRAL ARTERY OCCLUSION, UNSPECIFIED LATERALITY: ICD-10-CM

## 2020-07-02 DIAGNOSIS — Z00.00 MEDICARE ANNUAL WELLNESS VISIT, SUBSEQUENT: Primary | ICD-10-CM

## 2020-07-02 DIAGNOSIS — E11.9 TYPE 2 DIABETES MELLITUS WITHOUT COMPLICATION, WITHOUT LONG-TERM CURRENT USE OF INSULIN (HCC): ICD-10-CM

## 2020-07-02 DIAGNOSIS — E78.00 HYPERCHOLESTEROLEMIA: ICD-10-CM

## 2020-07-02 DIAGNOSIS — K21.9 GASTROESOPHAGEAL REFLUX DISEASE WITHOUT ESOPHAGITIS: ICD-10-CM

## 2020-07-02 DIAGNOSIS — C68.0 PRIMARY URETHRAL PAPILLARY CARCINOMA (HCC): ICD-10-CM

## 2020-07-02 RX ORDER — CEFUROXIME AXETIL 500 MG/1
TABLET ORAL
COMMUNITY
Start: 2020-04-07 | End: 2020-07-02 | Stop reason: ALTCHOICE

## 2020-07-02 RX ORDER — ACETAMINOPHEN 500 MG
TABLET ORAL
COMMUNITY
Start: 2016-11-11 | End: 2021-12-09 | Stop reason: ALTCHOICE

## 2020-07-02 RX ORDER — ASPIRIN 81 MG/1
TABLET ORAL
COMMUNITY
Start: 2016-11-11 | End: 2020-10-02 | Stop reason: SDUPTHER

## 2020-07-02 RX ORDER — AMLODIPINE AND VALSARTAN 10; 320 MG/1; MG/1
TABLET ORAL
COMMUNITY
Start: 2016-11-11 | End: 2020-08-10 | Stop reason: SDUPTHER

## 2020-07-02 RX ORDER — FINASTERIDE 5 MG/1
TABLET, FILM COATED ORAL
COMMUNITY
Start: 2020-05-14

## 2020-07-02 RX ORDER — LUBIPROSTONE 24 UG/1
CAPSULE, GELATIN COATED ORAL
COMMUNITY
Start: 2020-06-03 | End: 2020-12-28 | Stop reason: ALTCHOICE

## 2020-07-02 RX ORDER — DICLOFENAC SODIUM 10 MG/G
4 GEL TOPICAL
COMMUNITY
Start: 2016-11-11 | End: 2021-06-07

## 2020-07-02 RX ORDER — COLESEVELAM 180 1/1
TABLET ORAL
COMMUNITY
Start: 2016-11-11 | End: 2020-10-02 | Stop reason: SDUPTHER

## 2020-07-02 RX ORDER — METFORMIN HYDROCHLORIDE 500 MG/1
TABLET ORAL
COMMUNITY
Start: 2016-11-11 | End: 2020-07-09 | Stop reason: SDUPTHER

## 2020-07-02 RX ORDER — CLOPIDOGREL BISULFATE 75 MG/1
TABLET ORAL
COMMUNITY
Start: 2016-11-11 | End: 2020-07-31 | Stop reason: SDUPTHER

## 2020-07-02 RX ORDER — GLIMEPIRIDE 2 MG/1
TABLET ORAL
COMMUNITY
Start: 2016-11-11 | End: 2020-08-19 | Stop reason: DRUGHIGH

## 2020-07-02 NOTE — PROGRESS NOTES
HISTORY OF PRESENT ILLNESS  Cj Falk is a 79 y.o. male. HPI Medicare wellness visit and follow up health problems. Hypercholesterolemia. Taking welchol as prescribed. Following healthy diet, limited exercise. HTN. Adhering to medication regimen.  T2DM. Taking glimeperide and metformin as prescribed. History of vertebral artery occlusion. Taking plavix as prescribed. Bladder cancer. Followed by oncology and urology. GERD. Under care of Dr. Selene Dial. Continues to have intermittent nausea. On probiotics and zofran. Taking lansoprazole as prescribed.   Past Medical History:   Diagnosis Date    Bladder cancer ([facility] 11/2018    Dr. 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    Dr Anish Babin Skin cancer of face     Stenosis of right vertebral artery     Ureteral calculus 5/26/2010     Patient Active Problem List   Diagnosis Code    Type 2 diabetes mellitus without complication, without long-term current use of insulin ([facility] E11.9    Hypercholesterolemia E78.00    Benign essential HTN I10    Vertebral artery occlusion, unspecified laterality I65.09    Gastroesophageal reflux disease without esophagitis K21.9    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     Current Outpatient Medications   Medication Sig    aspirin delayed-release 81 mg tablet   81 mg, = 1 tab, Tab-EC, Oral, Daily, 30 tab, Refills 0, Print Requisition, 0    acetaminophen (TYLENOL) 500 mg tablet   500 mg, = 1 tab, Tab, Oral, q4hr PRN, Refills 0, not to exceed 3000 mg/day, as needed for pain  Special Instructions: not to exceed 3000 mg/day    finasteride (PROSCAR) 5 mg tablet TAKE 1 TABLET BY MOUTH EVERY DAY    Amitiza 24 mcg capsule TAKE 1 CAPSULE BY MOUTH TWICE A DAY    amLODIPine-valsartan (Exforge)  mg per tablet   1 tab, Tab, Oral, Daily, 30 tab, Refills 0, Print Requisition, 0    clopidogreL (PLAVIX) 75 mg tab   75 mg, = 1 tab, Tab, Oral, Daily, 30 tab, Refills 0, Print Requisition, 0    colesevelam (WELCHOL) 625 mg tablet   1,875 mg, = 3 tab, Tab, Oral, BIDWM, 180 tab, Refills 0, Print Requisition, 0    diclofenac (Voltaren) 1 % gel 4 g.    glimepiride (AMARYL) 2 mg tablet   2 mg, = 1 tab, Tab, Oral, BID, 60 tab, Refills 0, Print Requisition, 0    metFORMIN (GLUCOPHAGE) 500 mg tablet   1,000 mg, = 2 tab, Tab, Oral, BIDWM, 120 tab, Refills 0, Print Requisition, 0    diclofenac (VOLTAREN) 1 % gel Apply 4 g to affected area four (4) times daily.  ondansetron (ZOFRAN ODT) 4 mg disintegrating tablet TAKE 1 TABLET BY MOUTH EVERY 8 HOURS AS NEEDED FOR NAUSEA    clotrimazole-betamethasone (LOTRISONE) topical cream APPLY TO AFFECTED AREA TWICE A DAY    colesevelam (WELCHOL) 625 mg tablet TAKE 3 TABLETS BY MOUTH TWICE A DAY    glimepiride (AMARYL) 4 mg tablet TAKE 1 TABLET BY MOUTH TWICE A DAY (Patient taking differently: Take 4 mg by mouth every morning.)    amLODIPine-valsartan (EXFORGE)  mg per tablet TAKE 1 TABLET BY MOUTH EVERY DAY    clopidogreL (PLAVIX) 75 mg tab TAKE 1 TABLET BY MOUTH EVERY DAY    metFORMIN ER (GLUCOPHAGE XR) 500 mg tablet TAKE 1 TABLET BY MOUTH TWICE A DAY    ACCU-CHEK GIANNI PLUS TEST STRP strip TEST DAILY AS DIRECTED *E11.9*    Wheat Dextrin (BENEFIBER CLEAR) 3 gram/3.5 gram pwpk Take  by mouth.  Bifidobacterium Infantis (ALIGN) 4 mg cap Take  by mouth.  alpha-d-galactosidase (BEANO) 150 unit tab tablet Take  by mouth.  cholecalciferol (VITAMIN D3) 1,000 unit tablet Take  by mouth daily.  aspirin delayed-release 81 mg tablet Take 1 Tab by mouth daily. No current facility-administered medications for this visit.       Social History     Tobacco Use    Smoking status: Former Smoker     Packs/day: 1.00     Years: 30.00     Pack years: 30.00     Last attempt to quit: 1992     Years since quittin.1    Smokeless tobacco: Never Used   Substance Use Topics    Alcohol use: No    Drug use: No     Blood pressure 138/82, pulse 67, temperature 97.7 °F (36.5 °C), temperature source Oral, resp. rate 16, height 5' 9\" (1.753 m), weight 204 lb (92.5 kg), SpO2 96 %. Review of Systems   Respiratory: Negative for cough and shortness of breath. Cardiovascular: Negative for chest pain, palpitations and leg swelling. Gastrointestinal: Positive for nausea (occasional). Negative for abdominal pain, blood in stool, constipation, diarrhea and vomiting. Neurological: Negative for dizziness and headaches. All other systems reviewed and are negative. Physical Exam  Constitutional:       General: He is not in acute distress. Neck:      Musculoskeletal: Neck supple. Cardiovascular:      Rate and Rhythm: Normal rate and regular rhythm. Heart sounds: Normal heart sounds. Pulmonary:      Effort: Pulmonary effort is normal.      Breath sounds: Normal breath sounds. Abdominal:      General: There is no distension. Palpations: Abdomen is soft. Tenderness: There is no abdominal tenderness. Musculoskeletal:      Right lower leg: No edema. Left lower leg: No edema. Lymphadenopathy:      Cervical: No cervical adenopathy. Skin:     General: Skin is warm and dry. Neurological:      General: No focal deficit present. Mental Status: He is alert.       Coordination: Coordination normal.      Gait: Gait normal.   Psychiatric:         Mood and Affect: Mood normal.         Behavior: Behavior normal.     Diabetic foot exam:     Left Foot:   Visual Exam: normal    Pulse DP: 2+ (normal)   Filament test: normal sensation    Vibratory sensation: normal      Right Foot:   Visual Exam: normal    Pulse DP: 2+ (normal)   Filament test: normal sensation    Vibratory sensation: normal      ASSESSMENT and PLAN  Diagnoses and all orders for this visit:    1. Medicare annual wellness visit, subsequent  See note. 2. Type 2 diabetes mellitus without complication, without long-term current use of insulin (HCC)  -     HEMOGLOBIN A1C WITH EAG  -     MICROALBUMIN, UR, RAND W/ MICROALB/CREAT RATIO  -      DIABETES FOOT EXAM  Controlled based on last A1C. Reviewed diabetic diet and carbohydrate restriction. 3. Hypercholesterolemia  -     LIPID PANEL  -     METABOLIC PANEL, COMPREHENSIVE  -     SD HANDLG&/OR CONVEY OF SPEC FOR TR OFFICE TO LAB  -     COLLECTION VENOUS BLOOD,VENIPUNCTURE  Reviewed healthy diet and exercise recommendations. Fasting labs sent. 4. Gastroesophageal reflux disease without esophagitis  Stable on PPI. 5. Primary urethral papillary carcinoma ([facility]  Follow up as scheduled with urology, oncology. 6. Vertebral artery occlusion, unspecified laterality  Stable on plavix. Follow up 6 months or sooner prn. We discussed the expected course, resolution and complications of the diagnosis in detail. Medication risks, benefits, costs, interactions and side effects were discussed. The patient was advised to contact the office for worsening of condition or FTI as anticipated. The patient expressed understanding of the diagnosis and plan. This is the Subsequent Medicare Annual Wellness Exam, performed 12 months or more after the Initial AWV or the last Subsequent AWV    I have reviewed the patient's medical history in detail and updated the computerized patient record.      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 Toshia Castlilo     Current Outpatient Medications   Medication Sig Dispense Refill    aspirin delayed-release 81 mg tablet   81 mg, = 1 tab, Tab-EC, Oral, Daily, 30 tab, Refills 0, Print Requisition, 0      acetaminophen (TYLENOL) 500 mg tablet   500 mg, = 1 tab, Tab, Oral, q4hr PRN, Refills 0, not to exceed 3000 mg/day, as needed for pain  Special Instructions: not to exceed 3000 mg/day      finasteride (PROSCAR) 5 mg tablet TAKE 1 TABLET BY MOUTH EVERY DAY      Amitiza 24 mcg capsule TAKE 1 CAPSULE BY MOUTH TWICE A DAY      amLODIPine-valsartan (Exforge)  mg per tablet   1 tab, Tab, Oral, Daily, 30 tab, Refills 0, Print Requisition, 0      clopidogreL (PLAVIX) 75 mg tab   75 mg, = 1 tab, Tab, Oral, Daily, 30 tab, Refills 0, Print Requisition, 0      colesevelam (WELCHOL) 625 mg tablet   1,875 mg, = 3 tab, Tab, Oral, BIDWM, 180 tab, Refills 0, Print Requisition, 0      diclofenac (Voltaren) 1 % gel 4 g.      glimepiride (AMARYL) 2 mg tablet   2 mg, = 1 tab, Tab, Oral, BID, 60 tab, Refills 0, Print Requisition, 0      metFORMIN (GLUCOPHAGE) 500 mg tablet   1,000 mg, = 2 tab, Tab, Oral, BIDWM, 120 tab, Refills 0, Print Requisition, 0      diclofenac (VOLTAREN) 1 % gel Apply 4 g to affected area four (4) times daily. 100 g 0    ondansetron (ZOFRAN ODT) 4 mg disintegrating tablet TAKE 1 TABLET BY MOUTH EVERY 8 HOURS AS NEEDED FOR NAUSEA 30 Tab 0    clotrimazole-betamethasone (LOTRISONE) topical cream APPLY TO AFFECTED AREA TWICE A DAY 60 g 0    colesevelam (WELCHOL) 625 mg tablet TAKE 3 TABLETS BY MOUTH TWICE A  Tab 0    glimepiride (AMARYL) 4 mg tablet TAKE 1 TABLET BY MOUTH TWICE A DAY (Patient taking differently: Take 4 mg by mouth every morning.) 180 Tab 0    amLODIPine-valsartan (EXFORGE)  mg per tablet TAKE 1 TABLET BY MOUTH EVERY DAY 90 Tab 0    clopidogreL (PLAVIX) 75 mg tab TAKE 1 TABLET BY MOUTH EVERY DAY 90 Tab 0    metFORMIN ER (GLUCOPHAGE XR) 500 mg tablet TAKE 1 TABLET BY MOUTH TWICE A  Tab 0    ACCU-CHEK GIANNI PLUS TEST STRP strip TEST DAILY AS DIRECTED *E11.9* 100 Strip 0    Wheat Dextrin (BENEFIBER CLEAR) 3 gram/3.5 gram pwpk Take  by mouth.  Bifidobacterium Infantis (ALIGN) 4 mg cap Take  by mouth.  alpha-d-galactosidase (BEANO) 150 unit tab tablet Take  by mouth.  cholecalciferol (VITAMIN D3) 1,000 unit tablet Take  by mouth daily.  aspirin delayed-release 81 mg tablet Take 1 Tab by mouth daily. 30 Tab 0     Allergies   Allergen Reactions    Benicar [Olmesartan] Unable to Obtain    Moexipril Other (comments)    Pcn [Penicillins] Unable to Obtain    Simvastatin Myalgia    Simvastatin Myalgia    Uniretic [Moexipril-Hydrochlorothiazide] Other (comments)     GI upset       History reviewed. No pertinent family history.   Social History     Tobacco Use    Smoking status: Former Smoker     Packs/day: 1.00     Years: 30.00     Pack years: 30.00     Last attempt to quit: 1992     Years since quittin.1    Smokeless tobacco: Never Used   Substance Use Topics    Alcohol use: No       Depression Risk Factor Screening:     3 most recent PHQ Screens 7/2/2020   Little interest or pleasure in doing things Not at all   Feeling down, depressed, irritable, or hopeless Not at all   Total Score PHQ 2 0       Alcohol Risk Factor Screening (MALE > 65): Do you average more 1 drink per night or more than 7 drinks a week: No    In the past three months have you have had more than 4 drinks containing alcohol on one occasion: No      Functional Ability and Level of Safety:   Hearing: Hearing is good. Activities of Daily Living: The home contains: no safety equipment. Patient does total self care     Ambulation: with no difficulty     Fall Risk:  Fall Risk Assessment, last 12 mths 7/2/2020   Able to walk? Yes   Fall in past 12 months? No     Abuse Screen:  Patient is not abused       Cognitive Screening   Has your family/caregiver stated any concerns about your memory: no     Cognitive Screening: Normal - Mini Cog Test    Patient Care Team   Patient Care Team:  Toni Cisneros NP as PCP - General (Family Practice)  Toni Cisneros NP as PCP - [facility]ak Provider  Suzy Maddox MD (Urology)    Assessment/Plan   Education and counseling provided:  Are appropriate based on today's review and evaluation    Diagnoses and all orders for this visit:    1. Medicare annual wellness visit, subsequent    2. Type 2 diabetes mellitus without complication, without long-term current use of insulin ([facility])  -     HEMOGLOBIN A1C WITH EAG  -     MICROALBUMIN, UR, RAND W/ MICROALB/CREAT RATIO  -      DIABETES FOOT EXAM    3. Hypercholesterolemia  -     LIPID PANEL  -     METABOLIC PANEL, COMPREHENSIVE  -     MD HANDLG&/OR CONVEY OF SPEC FOR TR OFFICE TO LAB  -     COLLECTION VENOUS BLOOD,VENIPUNCTURE    4. Gastroesophageal reflux disease without esophagitis    5. Primary urethral papillary carcinoma ([facility]    6.  Vertebral artery occlusion, unspecified laterality        Health Maintenance Due   Topic Date Due    Eye Exam Retinal or Dilated  06/25/2016    Medicare Yearly Exam  11/02/2019    GLAUCOMA SCREENING Q2Y  02/26/2020    A1C test (Diabetic or Prediabetic)  04/24/2020    Foot Exam Q1  05/06/2020    MICROALBUMIN Q1  05/06/2020

## 2020-07-03 LAB
ALBUMIN SERPL-MCNC: 4.9 G/DL (ref 3.6–4.6)
ALBUMIN/CREAT UR: <10 MG/G CREAT (ref 0–29)
ALBUMIN/GLOB SERPL: 2.3 {RATIO} (ref 1.2–2.2)
ALP SERPL-CCNC: 77 IU/L (ref 39–117)
ALT SERPL-CCNC: 22 IU/L (ref 0–44)
AST SERPL-CCNC: 18 IU/L (ref 0–40)
BILIRUB SERPL-MCNC: 0.6 MG/DL (ref 0–1.2)
BUN SERPL-MCNC: 18 MG/DL (ref 8–27)
BUN/CREAT SERPL: 16 (ref 10–24)
CALCIUM SERPL-MCNC: 9.5 MG/DL (ref 8.6–10.2)
CHLORIDE SERPL-SCNC: 104 MMOL/L (ref 96–106)
CHOLEST SERPL-MCNC: 113 MG/DL (ref 100–199)
CO2 SERPL-SCNC: 22 MMOL/L (ref 20–29)
CREAT SERPL-MCNC: 1.13 MG/DL (ref 0.76–1.27)
CREAT UR-MCNC: 29.1 MG/DL
EST. AVERAGE GLUCOSE BLD GHB EST-MCNC: 134 MG/DL
GLOBULIN SER CALC-MCNC: 2.1 G/DL (ref 1.5–4.5)
GLUCOSE SERPL-MCNC: 94 MG/DL (ref 65–99)
HBA1C MFR BLD: 6.3 % (ref 4.8–5.6)
HDLC SERPL-MCNC: 50 MG/DL
INTERPRETATION, 910389: NORMAL
LDLC SERPL CALC-MCNC: 45 MG/DL (ref 0–99)
MICROALBUMIN UR-MCNC: <3 UG/ML
POTASSIUM SERPL-SCNC: 4.4 MMOL/L (ref 3.5–5.2)
PROT SERPL-MCNC: 7 G/DL (ref 6–8.5)
SODIUM SERPL-SCNC: 141 MMOL/L (ref 134–144)
TRIGL SERPL-MCNC: 89 MG/DL (ref 0–149)
VLDLC SERPL CALC-MCNC: 18 MG/DL (ref 5–40)

## 2020-07-03 NOTE — PATIENT INSTRUCTIONS
Medicare Wellness Visit, Male The best way to live healthy is to have a lifestyle where you eat a well-balanced diet, exercise regularly, limit alcohol use, and quit all forms of tobacco/nicotine, if applicable. Regular preventive services are another way to keep healthy. Preventive services (vaccines, screening tests, monitoring & exams) can help personalize your care plan, which helps you manage your own care. Screening tests can find health problems at the earliest stages, when they are easiest to treat. [facility] follows the current, evidence-based guidelines published by the [facility] ([facility]) when recommending preventive services for our patients. Because we follow these guidelines, sometimes recommendations change over time as research supports it. (For example, a prostate screening blood test is no longer routinely recommended for men with no symptoms). Of course, you and your doctor may decide to screen more often for some diseases, based on your risk and co-morbidities (chronic disease you are already diagnosed with). Preventive services for you include: - Medicare offers their members a free annual wellness visit, which is time for you and your primary care provider to discuss and plan for your preventive service needs. Take advantage of this benefit every year! 
-All adults over age 72 should receive the recommended pneumonia vaccines. Current USPSTF guidelines recommend a series of two vaccines for the best pneumonia protection.  
-All adults should have a flu vaccine yearly and tetanus vaccine every 10 years. 
-All adults age 48 and older should receive the shingles vaccines (series of two vaccines).       
-All adults age 38-68 who are overweight should have a diabetes screening test once every three years.  
-Other screening tests & preventive services for persons with diabetes include: an eye exam to screen for diabetic retinopathy, a kidney function test, a foot exam, and stricter control over your cholesterol.  
-Cardiovascular screening for adults with routine risk involves an electrocardiogram (ECG) at intervals determined by the provider.  
-Colorectal cancer screening should be done for adults age 54-65 with no increased risk factors for colorectal cancer. There are a number of acceptable methods of screening for this type of cancer. Each test has its own benefits and drawbacks. Discuss with your provider what is most appropriate for you during your annual wellness visit. The different tests include: colonoscopy (considered the best screening method), a fecal occult blood test, a fecal DNA test, and sigmoidoscopy. 
-All adults born between [facility] should be screened once for Hepatitis C. 
-An Abdominal Aortic Aneurysm (AAA) Screening is recommended for men age 73-68 who has ever smoked in their lifetime. Here is a list of your current Health Maintenance items (your personalized list of preventive services) with a due date: 
Health Maintenance Due Topic Date Due [facility] Eye Exam  06/25/2016 [facility] Annual Well Visit  11/02/2019  Glaucoma Screening   02/26/2020  Hemoglobin A1C    04/24/2020 [address] [place] [address] Diabetic Foot Care  05/06/2020  Albumin Urine Test  05/06/2020

## 2020-07-08 RX ORDER — LANSOPRAZOLE 30 MG/1
30 CAPSULE, DELAYED RELEASE ORAL
Qty: 90 CAP | Refills: 1 | Status: SHIPPED | OUTPATIENT
Start: 2020-07-08 | End: 2020-12-28 | Stop reason: SDUPTHER

## 2020-07-08 NOTE — TELEPHONE ENCOUNTER
LORY Mcdonough,    Patient request for new rx. This medication not listed as current medication (therapy completed 5/6/19) however was mentioned in notes on visit 7/2/20. Attached new rx if appropriate. Thanks, Mayra Patel    Last Visit: 7/2/20  LORY Mcdonough  Next Appointment: Not scheduled  Previous Refill Encounter(s): 1/29/19  30 + 3 refills    Requested Prescriptions     Pending Prescriptions Disp Refills    lansoprazole (PREVACID) 30 mg capsule 90 Cap 1     Sig: Take 1 Cap by mouth Daily (before breakfast).  Indications: gastroesophageal reflux disease

## 2020-09-10 ENCOUNTER — TELEPHONE (OUTPATIENT)
Dept: FAMILY MEDICINE CLINIC | Age: 80
End: 2020-09-10

## 2020-09-10 DIAGNOSIS — E11.9 TYPE 2 DIABETES MELLITUS WITHOUT COMPLICATION, WITHOUT LONG-TERM CURRENT USE OF INSULIN (HCC): Primary | ICD-10-CM

## 2020-09-10 RX ORDER — METFORMIN HYDROCHLORIDE 500 MG/1
500 TABLET ORAL 2 TIMES DAILY WITH MEALS
Qty: 180 TAB | Refills: 0 | Status: SHIPPED | OUTPATIENT
Start: 2020-09-10 | End: 2021-01-06 | Stop reason: ALTCHOICE

## 2020-09-10 NOTE — TELEPHONE ENCOUNTER
----- Message from Court [place] sent at 9/4/2020  1:14 PM EDT -----  Regarding: NP Rittman/ telephone  Caller's first and last name: pt.   Reason for call: pt. states he received a letter in the mail this morning from [facility] pharmacy that there was a recall on his Metformin 500mg medication. He was advised to contact his PCP because it may be a health hazard. Callback required yes/no and why: Yes   Best contact number(s): [phone]   Details to clarify the request: pt. states he feels fine and he had one this morning.

## 2020-09-10 NOTE — TELEPHONE ENCOUNTER
Spoke with patient name and  verified. Patient informed that [facility], NP has reviewed and has sent new Metformin immediate release to pharmacy and he has problems to let our office know. Patient verbalized understanding.   Kayla Webb LPN

## 2020-10-02 ENCOUNTER — OFFICE VISIT (OUTPATIENT)
Dept: FAMILY MEDICINE CLINIC | Age: 80
End: 2020-10-02
Payer: MEDICARE

## 2020-10-02 VITALS
HEART RATE: 70 BPM | HEIGHT: 69 IN | BODY MASS INDEX: 30.69 KG/M2 | DIASTOLIC BLOOD PRESSURE: 70 MMHG | WEIGHT: 207.2 LBS | SYSTOLIC BLOOD PRESSURE: 122 MMHG | TEMPERATURE: 97.6 F | RESPIRATION RATE: 18 BRPM | OXYGEN SATURATION: 95 %

## 2020-10-02 DIAGNOSIS — M79.89 RIGHT LEG SWELLING: Primary | ICD-10-CM

## 2020-10-02 DIAGNOSIS — Z23 NEEDS FLU SHOT: ICD-10-CM

## 2020-10-02 PROCEDURE — G0463 HOSPITAL OUTPT CLINIC VISIT: HCPCS | Performed by: NURSE PRACTITIONER

## 2020-10-02 PROCEDURE — G8752 SYS BP LESS 140: HCPCS | Performed by: NURSE PRACTITIONER

## 2020-10-02 PROCEDURE — G8536 NO DOC ELDER MAL SCRN: HCPCS | Performed by: NURSE PRACTITIONER

## 2020-10-02 PROCEDURE — 1101F PT FALLS ASSESS-DOCD LE1/YR: CPT | Performed by: NURSE PRACTITIONER

## 2020-10-02 PROCEDURE — G8432 DEP SCR NOT DOC, RNG: HCPCS | Performed by: NURSE PRACTITIONER

## 2020-10-02 PROCEDURE — 99214 OFFICE O/P EST MOD 30 MIN: CPT | Performed by: NURSE PRACTITIONER

## 2020-10-02 PROCEDURE — 90662 IIV NO PRSV INCREASED AG IM: CPT | Performed by: NURSE PRACTITIONER

## 2020-10-02 PROCEDURE — G8427 DOCREV CUR MEDS BY ELIG CLIN: HCPCS | Performed by: NURSE PRACTITIONER

## 2020-10-02 PROCEDURE — G8417 CALC BMI ABV UP PARAM F/U: HCPCS | Performed by: NURSE PRACTITIONER

## 2020-10-02 PROCEDURE — 90662 IIV NO PRSV INCREASED AG IM: CPT

## 2020-10-02 PROCEDURE — G8754 DIAS BP LESS 90: HCPCS | Performed by: NURSE PRACTITIONER

## 2020-10-02 NOTE — PROGRESS NOTES
[facility] [facility] Note  Subjective:      Flower Bolton is a 79 y.o. male who presents for an acute same-day visit with the following chief complaints. Chief Complaint   Patient presents with    Ankle swelling     Right begin a week ago. Flower Boltno is a 79 y.o. male with significant PMH of bladder cancer, primary urethral papillary carcinoma, skin cancer, DM, CVA, HTN, HLD, GERD, presenting today with acute swelling of the right ankle. Onset was about 1 week ago, no change since onset. The swelling is present all day. The patient states this has not happened in the past.  The swelling has been aggravated by nothing, relieved by elevation of involved area, and been associated with nothing. He denies shortness of breath, weight gain, recent surgery, recent injury, diagnosis of heart failure, diagnosis of right heart failure, venous insufficiency. He is on norvasc but no recent changes to therapy. His risk for DVT includes history of malignancy - he reports he has been cancer free for 8 years - seeing oncologist routinely. Cardiac risk factors include dyslipidemia, diabetes mellitus, male gender, hypertension. Current Outpatient Medications   Medication Sig Dispense Refill    metFORMIN (GLUCOPHAGE) 500 mg tablet Take 1 Tab by mouth two (2) times daily (with meals). 180 Tab 0    glimepiride (AMARYL) 4 mg tablet Take 1 Tab by mouth every morning. 90 Tab 1    amLODIPine-valsartan (EXFORGE)  mg per tablet TAKE 1 TABLET BY MOUTH EVERY DAY 90 Tab 1    clopidogreL (PLAVIX) 75 mg tab TAKE 1 TABLET BY MOUTH EVERY DAY 90 Tab 1    lansoprazole (PREVACID) 30 mg capsule Take 1 Cap by mouth Daily (before breakfast).  Indications: gastroesophageal reflux disease 90 Cap 1    acetaminophen (TYLENOL) 500 mg tablet   500 mg, = 1 tab, Tab, Oral, q4hr PRN, Refills 0, not to exceed 3000 mg/day, as needed for pain  Special Instructions: not to exceed 3000 mg/day      finasteride (PROSCAR) 5 mg tablet TAKE 1 TABLET BY MOUTH EVERY DAY      Amitiza 24 mcg capsule TAKE 1 CAPSULE BY MOUTH TWICE A DAY      diclofenac (Voltaren) 1 % gel 4 g.      diclofenac (VOLTAREN) 1 % gel Apply 4 g to affected area four (4) times daily. 100 g 0    ondansetron (ZOFRAN ODT) 4 mg disintegrating tablet TAKE 1 TABLET BY MOUTH EVERY 8 HOURS AS NEEDED FOR NAUSEA 30 Tab 0    clotrimazole-betamethasone (LOTRISONE) topical cream APPLY TO AFFECTED AREA TWICE A DAY 60 g 0    colesevelam (WELCHOL) 625 mg tablet TAKE 3 TABLETS BY MOUTH TWICE A  Tab 0    ACCU-CHEK GIANNI PLUS TEST STRP strip TEST DAILY AS DIRECTED *E11.9* 100 Strip 0    Wheat Dextrin (BENEFIBER CLEAR) 3 gram/3.5 gram pwpk Take  by mouth.  Bifidobacterium Infantis (ALIGN) 4 mg cap Take  by mouth.  cholecalciferol (VITAMIN D3) 1,000 unit tablet Take  by mouth daily.  aspirin delayed-release 81 mg tablet Take 1 Tab by mouth daily.  30 Tab 0     Allergies   Allergen Reactions    Benicar [Olmesartan] Unable to Obtain    Moexipril Other (comments)    Pcn [Penicillins] Unable to Obtain    Simvastatin Myalgia    Simvastatin Myalgia    Uniretic [Moexipril-Hydrochlorothiazide] Other (comments)     GI upset     Past Medical History:   Diagnosis Date    [facility]    Dr. Vijay Guallpa    Calculus of kidney     Cerebral artery occlusion with cerebral infarction ([facility] 10/2016    Elevated cholesterol 5/26/2010    Fracture of right ankle 5/26/2010    HBP (high blood pressure) 5/26/2010    NIDDM (non-insulin dependent diabetes mellitus) 05/26/2010    Occlusion of left vertebral artery 2016    Primary urethral papillary carcinoma ([facility] 2014    Dr Angi Madrid Skin cancer of face     Stenosis of right vertebral artery     Ureteral calculus 5/26/2010     Past Surgical History:   Procedure Laterality Date    ENDOSCOPY, COLON, DIAGNOSTIC  02; 2/07; 2012    polyp 5 yr    HX TONSILLECTOMY      HX UROLOGICAL  2014 bladder    HX UROLOGICAL  2015    bladder    HX UROLOGICAL  2020    transurethral resection of recurrent bladder tumor Dr. Orozco [place] History     Socioeconomic History    Marital status:      Spouse name: Not on file    Number of children: Not on file    Years of education: Not on file    Highest education level: Not on file   Occupational History    Not on file   Social Needs    Financial resource strain: Not on file    Food insecurity     Worry: Not on file     Inability: Not on file    Transportation needs     Medical: Not on file     Non-medical: Not on file   Tobacco Use    Smoking status: Former Smoker     Packs/day: 1.00     Years: 30.00     Pack years: 30.00     Last attempt to quit: 1992     Years since quittin.3    Smokeless tobacco: Never Used   Substance and Sexual Activity    Alcohol use: No    Drug use: No    Sexual activity: Yes     Partners: Female   Lifestyle    Physical activity     Days per week: Not on file     Minutes per session: Not on file    Stress: Not on file   Relationships    Social connections     Talks on phone: Not on file     Gets together: Not on file     Attends Congregation service: Not on file     Active member of club or organization: Not on file     Attends meetings of clubs or organizations: Not on file     Relationship status: Not on file    Intimate partner violence     Fear of current or ex partner: Not on file     Emotionally abused: Not on file     Physically abused: Not on file     Forced sexual activity: Not on file   Other Topics Concern    Not on file   Social History Narrative    Not on file         ROS:   Complete review of systems was reviewed with pertinent information listed in HPI. Review of Systems   Constitutional: Negative for chills, diaphoresis, fever, malaise/fatigue and weight loss. HENT: Negative for congestion, ear pain, hearing loss, sinus pain, sore throat and tinnitus.     Eyes: Negative for blurred vision and double vision. Respiratory: Negative for shortness of breath. Cardiovascular: Positive for leg swelling. Negative for chest pain and palpitations. Gastrointestinal: Negative for abdominal pain, blood in stool, constipation, diarrhea, heartburn, melena, nausea and vomiting. Genitourinary: Negative for dysuria, frequency, hematuria and urgency. Musculoskeletal: Negative for falls, joint pain and myalgias. Skin: Negative for itching and rash. Neurological: Negative for dizziness, tingling, weakness and headaches. Endo/Heme/Allergies: Negative for polydipsia. Psychiatric/Behavioral: Negative. Objective:     Visit Vitals  /70 (BP 1 Location: Left arm, BP Patient Position: Sitting)   Pulse 70   Temp 97.6 °F (36.4 °C) (Temporal)   Resp 18   Ht 5' 9\" (1.753 m)   Wt 207 lb 3.2 oz (94 kg)   SpO2 95%   BMI 30.60 kg/m²       Vitals and Nurse Documentation reviewed. Physical Exam  Constitutional:       General: He is not in acute distress. Appearance: Normal appearance. He is well-developed, well-groomed and normal weight. He is not ill-appearing, toxic-appearing or diaphoretic. HENT:      Head: Normocephalic and atraumatic. Eyes:      General: Lids are normal.   Neck:      Musculoskeletal: Normal range of motion and neck supple. Cardiovascular:      Rate and Rhythm: Normal rate and regular rhythm. Pulses: Normal pulses. Popliteal pulses are 2+ on the right side and 2+ on the left side. Dorsalis pedis pulses are 2+ on the right side and 2+ on the left side. Heart sounds: Normal heart sounds, S1 normal and S2 normal.   Pulmonary:      Effort: Pulmonary effort is normal.      Breath sounds: Normal breath sounds. Musculoskeletal:      Right knee: He exhibits normal range of motion, no swelling, no effusion, no ecchymosis, no deformity, no laceration, no erythema and normal alignment. No tenderness found.       Right ankle: He exhibits swelling. He exhibits normal range of motion, no ecchymosis, no deformity, no laceration and normal pulse. No tenderness. No lateral malleolus, no medial malleolus, no head of 5th metatarsal and no proximal fibula tenderness found. Achilles tendon normal. Achilles tendon exhibits no pain and no defect. Left ankle: He exhibits normal range of motion, no swelling, no ecchymosis, no deformity, no laceration and normal pulse. No tenderness. Achilles tendon normal. Achilles tendon exhibits no pain and no defect. Right lower leg: He exhibits tenderness. He exhibits no deformity and no laceration. 1+ Pitting Edema present. Left lower leg: No edema. Legs:         Feet:    Feet:      Comments: There is no erythema of the right leg, ankle or foot. Skin:     General: Skin is warm and dry. Capillary Refill: Capillary refill takes less than 2 seconds. Findings: No erythema. Neurological:      Mental Status: He is alert and oriented to person, place, and time. Psychiatric:         Mood and Affect: Mood normal.         Behavior: Behavior normal. Behavior is cooperative. Thought Content: Thought content normal.         Judgment: Judgment normal.        Left calf: 40 cm  Right calf: 41.6 cm    Assessment/Plan:     Diagnoses and all orders for this visit:    1. Right leg swelling: Acute non-traumatic unilateral swelling of the right lower extremity with 1.6 cm calf discrepancy compared to the left leg. No additional cardiopulmonary symptoms. Risk factors for DVT include history of malignancy. Will proceed with ultrasound of the right leg to rule out DVT. Advised ER for new or worsening symptoms. He states understanding.   -     DUPLEX LOWER EXT VENOUS RIGHT; Future    2. Needs flu shot: Risks and benefits of immunization reviewed, VIS provided. -     INFLUENZA, HIGH DOSE SEASONAL      Follow-up and Dispositions    · Return if symptoms worsen or fail to improve.        Discussed expected course/resolution/complications of diagnosis in detail with patient.    Medication risks/benefits/costs/interactions/alternatives discussed with patient.    Pt was given an after visit summary which includes diagnoses, current medications & vitals.    Pt expressed understanding with the diagnosis and plan

## 2020-10-02 NOTE — PATIENT INSTRUCTIONS
Vaccine Information Statement Influenza (Flu) Vaccine (Inactivated or Recombinant): What You Need to Know Many Vaccine Information Statements are available in Kiswahili and other languages. See www.immunize.org/vis Hojas de información sobre vacunas están disponibles en español y en muchos otros idiomas. Visite www.immunize.org/vis 1. Why get vaccinated? Influenza vaccine can prevent influenza (flu). Flu is a contagious disease that spreads around the [facility] every year, usually between October and May. Anyone can get the flu, but it is more dangerous for some people. Infants and young children, people 72years of age and older, pregnant women, and people with certain health conditions or a weakened immune system are at greatest risk of flu complications. Pneumonia, bronchitis, sinus infections and ear infections are examples of flu-related complications. If you have a medical condition, such as heart disease, cancer or diabetes, flu can make it worse. Flu can cause fever and chills, sore throat, muscle aches, fatigue, cough, headache, and runny or stuffy nose. Some people may have vomiting and diarrhea, though this is more common in children than adults. Each year thousands of people in the [facility] die from flu, and many more are hospitalized. Flu vaccine prevents millions of illnesses and flu-related visits to the doctor each year. 2. Influenza vaccines CDC recommends everyone 10months of age and older get vaccinated every flu season. Children 6 months through 6years of age may need 2 doses during a single flu season. Everyone else needs only 1 dose each flu season. It takes about 2 weeks for protection to develop after vaccination. There are many flu viruses, and they are always changing. Each year a new flu vaccine is made to protect against three or four viruses that are likely to cause disease in the upcoming flu season.  Even when the vaccine doesnt exactly match these viruses, it may still provide some protection. Influenza vaccine does not cause flu. Influenza vaccine may be given at the same time as other vaccines. 3. Talk with your health care provider Tell your vaccine provider if the person getting the vaccine: 
 Has had an allergic reaction after a previous dose of influenza vaccine, or has any severe, life-threatening allergies.  Has ever had Guillain-Barré Syndrome (also called GBS). In some cases, your health care provider may decide to postpone influenza vaccination to a future visit. People with minor illnesses, such as a cold, may be vaccinated. People who are moderately or severely ill should usually wait until they recover before getting influenza vaccine. Your health care provider can give you more information. 4. Risks of a reaction  Soreness, redness, and swelling where shot is given, fever, muscle aches, and headache can happen after influenza vaccine.  There may be a very small increased risk of Guillain-Barré Syndrome (GBS) after inactivated influenza vaccine (the flu shot). Kirsty Julio children who get the flu shot along with pneumococcal vaccine (PCV13), and/or DTaP vaccine at the same time might be slightly more likely to have a seizure caused by fever. Tell your health care provider if a child who is getting flu vaccine has ever had a seizure. People sometimes faint after medical procedures, including vaccination. Tell your provider if you feel dizzy or have vision changes or ringing in the ears. As with any medicine, there is a very remote chance of a vaccine causing a severe allergic reaction, other serious injury, or death. 5. What if there is a serious problem? An allergic reaction could occur after the vaccinated person leaves the clinic.  If you see signs of a severe allergic reaction (hives, swelling of the face and throat, difficulty breathing, a fast heartbeat, dizziness, or weakness), call 9-1-1 and get the person to the nearest hospital. 
 
For other signs that concern you, call your health care provider. Adverse reactions should be reported to the Vaccine Adverse Event Reporting System (VAERS). Your health care provider will usually file this report, or you can do it yourself. Visit the VAERS website at www.vaers. hhs.gov or call 8-437.531.4307. VAERS is only for reporting reactions, and VAERS staff do not give medical advice. 6. The National Vaccine Injury Compensation Program 
 
The [facility] Vaccine Injury Compensation Program (VICP) is a federal program that was created to compensate people who may have been injured by certain vaccines. Visit the VICP website at www.hrsa.gov/vaccinecompensation or call 3-939.452.5648 to learn about the program and about filing a claim. There is a time limit to file a claim for compensation. 7. How can I learn more?  Ask your health care provider.  Call your local or state health department.  Contact the Centers for Disease Control and Prevention (CDC): 
- Call 8-739.965.3250 (1-800-CDC-INFO) or 
- Visit CDCs influenza website at www.cdc.gov/flu Vaccine Information Statement (Interim) Inactivated Influenza Vaccine 8/15/2019 
42 ANUJA Sanders [id number]-35 Department of Health and [employer] Centers for Disease Control and Prevention Office Use Only

## 2020-10-02 NOTE — PROGRESS NOTES
Chief Complaint   Patient presents with    Ankle swelling     Right begin a week ago. 1. Have you been to the ER, urgent care clinic since your last visit? Hospitalized since your last visit? No    2. Have you seen or consulted any other health care providers outside of the [address] [place] [address] since your last visit? Include any pap smears or colon screening. Connie Benoit is a 79 y.o. male  who presents for routine Flu  immunizations. he denies any symptoms , reactions or allergies that would exclude them from being immunized today. Risks and adverse reactions were discussed and the VIS was given to them. All questions were addressed. he was observed for 10 min post injection. There were no reactions observed.     Dennis Amanda LPN

## 2020-10-06 ENCOUNTER — HOSPITAL ENCOUNTER (OUTPATIENT)
Dept: ULTRASOUND IMAGING | Age: 80
Discharge: HOME | End: 2020-10-06
Payer: MEDICARE

## 2020-10-06 PROCEDURE — 93971 EXTREMITY STUDY: CPT | Performed by: NURSE PRACTITIONER

## 2020-10-06 NOTE — PROGRESS NOTES
Mr. Leonarda Joaquin,     Good news, there was no evidence of a blood clot in your leg. Please schedule a follow-up your PCP, Aubree Hu for continued evaluation of the swelling in your leg. Please let me know if you have any additional questions.       Dwight Pulse, NP

## 2020-10-29 DIAGNOSIS — E11.9 TYPE 2 DIABETES MELLITUS WITHOUT COMPLICATION, WITHOUT LONG-TERM CURRENT USE OF INSULIN (HCC): ICD-10-CM

## 2020-10-29 RX ORDER — COLESEVELAM 180 1/1
TABLET ORAL
Qty: 540 TAB | Refills: 0 | Status: SHIPPED | OUTPATIENT
Start: 2020-10-29 | End: 2021-06-07

## 2020-11-30 ENCOUNTER — TELEPHONE (OUTPATIENT)
Dept: FAMILY MEDICINE CLINIC | Age: 80
End: 2020-11-30

## 2020-11-30 NOTE — TELEPHONE ENCOUNTER
TC to patient. Left message to return call regarding welchol prescription. I will also send mychart message.

## 2020-12-01 DIAGNOSIS — E78.00 HYPERCHOLESTEROLEMIA: Primary | ICD-10-CM

## 2020-12-01 RX ORDER — PRAVASTATIN SODIUM 10 MG/1
10 TABLET ORAL
Qty: 90 TAB | Refills: 0 | Status: SHIPPED | OUTPATIENT
Start: 2020-12-01 | End: 2021-02-23

## 2020-12-21 DIAGNOSIS — K21.9 GASTROESOPHAGEAL REFLUX DISEASE WITHOUT ESOPHAGITIS: ICD-10-CM

## 2020-12-21 DIAGNOSIS — L30.9 DERMATITIS: ICD-10-CM

## 2020-12-21 DIAGNOSIS — R11.0 NAUSEA: ICD-10-CM

## 2020-12-21 RX ORDER — CLOTRIMAZOLE AND BETAMETHASONE DIPROPIONATE 10; .64 MG/G; MG/G
CREAM TOPICAL
Qty: 60 G | Refills: 0 | Status: SHIPPED | OUTPATIENT
Start: 2020-12-21 | End: 2021-12-28

## 2020-12-21 RX ORDER — ONDANSETRON 4 MG/1
TABLET, ORALLY DISINTEGRATING ORAL
Qty: 30 TAB | Refills: 0 | Status: SHIPPED | OUTPATIENT
Start: 2020-12-21

## 2020-12-28 ENCOUNTER — OFFICE VISIT (OUTPATIENT)
Dept: FAMILY MEDICINE CLINIC | Age: 80
End: 2020-12-28
Payer: MEDICARE

## 2020-12-28 VITALS
BODY MASS INDEX: 29.86 KG/M2 | RESPIRATION RATE: 18 BRPM | HEART RATE: 65 BPM | HEIGHT: 69 IN | WEIGHT: 201.6 LBS | SYSTOLIC BLOOD PRESSURE: 132 MMHG | TEMPERATURE: 98 F | DIASTOLIC BLOOD PRESSURE: 69 MMHG | OXYGEN SATURATION: 97 %

## 2020-12-28 DIAGNOSIS — K21.9 GASTROESOPHAGEAL REFLUX DISEASE WITHOUT ESOPHAGITIS: ICD-10-CM

## 2020-12-28 DIAGNOSIS — C68.0 PRIMARY URETHRAL PAPILLARY CARCINOMA (HCC): ICD-10-CM

## 2020-12-28 DIAGNOSIS — I10 BENIGN ESSENTIAL HTN: ICD-10-CM

## 2020-12-28 DIAGNOSIS — E78.00 HYPERCHOLESTEROLEMIA: ICD-10-CM

## 2020-12-28 DIAGNOSIS — E11.9 TYPE 2 DIABETES MELLITUS WITHOUT COMPLICATION, WITHOUT LONG-TERM CURRENT USE OF INSULIN (HCC): Primary | ICD-10-CM

## 2020-12-28 DIAGNOSIS — I65.09 VERTEBRAL ARTERY OCCLUSION, UNSPECIFIED LATERALITY: ICD-10-CM

## 2020-12-28 LAB
ALBUMIN SERPL-MCNC: 4.1 G/DL (ref 3.5–5)
ALBUMIN/GLOB SERPL: 1.4 {RATIO} (ref 1.1–2.2)
ALP SERPL-CCNC: 103 U/L (ref 45–117)
ALT SERPL-CCNC: 22 U/L (ref 12–78)
ANION GAP SERPL CALC-SCNC: 2 MMOL/L (ref 5–15)
AST SERPL-CCNC: 15 U/L (ref 15–37)
BILIRUB SERPL-MCNC: 0.3 MG/DL (ref 0.2–1)
BUN SERPL-MCNC: 20 MG/DL (ref 6–20)
BUN/CREAT SERPL: 20 (ref 12–20)
CALCIUM SERPL-MCNC: 8.8 MG/DL (ref 8.5–10.1)
CHLORIDE SERPL-SCNC: 111 MMOL/L (ref 97–108)
CHOLEST SERPL-MCNC: 128 MG/DL
CO2 SERPL-SCNC: 27 MMOL/L (ref 21–32)
CREAT SERPL-MCNC: 1 MG/DL (ref 0.7–1.3)
EST. AVERAGE GLUCOSE BLD GHB EST-MCNC: 123 MG/DL
GLOBULIN SER CALC-MCNC: 2.9 G/DL (ref 2–4)
GLUCOSE SERPL-MCNC: 102 MG/DL (ref 65–100)
HBA1C MFR BLD: 5.9 % (ref 4–5.6)
HDLC SERPL-MCNC: 54 MG/DL
HDLC SERPL: 2.4 {RATIO} (ref 0–5)
LDLC SERPL CALC-MCNC: 64.2 MG/DL (ref 0–100)
LIPID PROFILE,FLP: NORMAL
POTASSIUM SERPL-SCNC: 4.6 MMOL/L (ref 3.5–5.1)
PROT SERPL-MCNC: 7 G/DL (ref 6.4–8.2)
SODIUM SERPL-SCNC: 140 MMOL/L (ref 136–145)
TRIGL SERPL-MCNC: 49 MG/DL (ref ?–150)
VLDLC SERPL CALC-MCNC: 9.8 MG/DL

## 2020-12-28 PROCEDURE — G8432 DEP SCR NOT DOC, RNG: HCPCS | Performed by: NURSE PRACTITIONER

## 2020-12-28 PROCEDURE — G8417 CALC BMI ABV UP PARAM F/U: HCPCS | Performed by: NURSE PRACTITIONER

## 2020-12-28 PROCEDURE — 1101F PT FALLS ASSESS-DOCD LE1/YR: CPT | Performed by: NURSE PRACTITIONER

## 2020-12-28 PROCEDURE — 99214 OFFICE O/P EST MOD 30 MIN: CPT | Performed by: NURSE PRACTITIONER

## 2020-12-28 PROCEDURE — G8754 DIAS BP LESS 90: HCPCS | Performed by: NURSE PRACTITIONER

## 2020-12-28 PROCEDURE — G8752 SYS BP LESS 140: HCPCS | Performed by: NURSE PRACTITIONER

## 2020-12-28 PROCEDURE — G8536 NO DOC ELDER MAL SCRN: HCPCS | Performed by: NURSE PRACTITIONER

## 2020-12-28 PROCEDURE — G8427 DOCREV CUR MEDS BY ELIG CLIN: HCPCS | Performed by: NURSE PRACTITIONER

## 2020-12-28 RX ORDER — LANSOPRAZOLE 30 MG/1
30 CAPSULE, DELAYED RELEASE ORAL
Qty: 90 CAP | Refills: 1 | Status: SHIPPED | OUTPATIENT
Start: 2020-12-28 | End: 2021-06-23

## 2020-12-28 NOTE — PROGRESS NOTES
Chief Complaint   Patient presents with    Diabetes     follow up       1. Have you been to the ER, urgent care clinic since your last visit? Hospitalized since your last visit? No    2. Have you seen or consulted any other health care providers outside of the [address] [place] [address] since your last visit? Include any pap smears or colon screening.  No    3 most recent PHQ Screens 10/2/2020   Little interest or pleasure in doing things Not at all   Feeling down, depressed, irritable, or hopeless Not at all   Total Score PHQ 2 0

## 2020-12-28 NOTE — PROGRESS NOTES
HISTORY OF PRESENT ILLNESS  Yoana Terrazas is a 79 y.o. male. HPI  6 month follow up chronic health problems. Hypercholesterolemia.  Taking welchol as prescribed.  Following healthy diet, no formal exercise, stays active. Reports he will no longer be able to continue welchol once current rx runs out due to formulary change. Discussed changing to low potency statin due to prior intolerance of simvastatin. HTN.   Adhering to medication regimen.  T2DM.  Taking glimeperide and metformin as prescribed. History of vertebral artery occlusion.  Taking plavix as prescribed.    Bladder cancer.  Followed by oncology and urology.      GERD.  Under care of Dr. Leticia Fallon.  Continues to have intermittent nausea.  On probiotics and zofran. Taking lansoprazole as prescribed. Past Medical History:   Diagnosis Date    Bladder [facility] ([facility] 11/2018    Dr. Juany Trimble    Calculus of kidney     Cerebral artery occlusion with cerebral infarction ([facility] 10/2016    Elevated cholesterol 5/26/2010    Fracture of right ankle 5/26/2010    HBP (high blood pressure) 5/26/2010    NIDDM (non-insulin dependent diabetes mellitus) 05/26/2010    Occlusion of left vertebral artery 2016    Primary urethral papillary carcinoma ([facility] 2014    Dr Roman Ip Skin cancer of face     Stenosis of right vertebral artery     Ureteral calculus 5/26/2010     Patient Active Problem List   Diagnosis Code    Type 2 diabetes mellitus without complication, without long-term current use of insulin [facility] E11.9    Hypercholesterolemia E78.00    Benign essential HTN I10    Vertebral artery occlusion, unspecified laterality I65.09    Gastroesophageal reflux disease without esophagitis K21.9    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     Current Outpatient Medications   Medication Sig    lansoprazole (PREVACID) 30 mg capsule Take 1 Cap by mouth Daily (before breakfast).  Indications: gastroesophageal reflux disease    clotrimazole-betamethasone (LOTRISONE) topical cream APPLY TO AFFECTED AREA TWICE A DAY    ondansetron (ZOFRAN ODT) 4 mg disintegrating tablet TAKE 1 TABLET BY MOUTH EVERY 8 HOURS AS NEEDED FOR NAUSEA    colesevelam (WELCHOL) 625 mg tablet TAKE 3 TABLETS BY MOUTH TWICE A DAY    metFORMIN (GLUCOPHAGE) 500 mg tablet Take 1 Tab by mouth two (2) times daily (with meals).  glimepiride (AMARYL) 4 mg tablet Take 1 Tab by mouth every morning.  amLODIPine-valsartan (EXFORGE)  mg per tablet TAKE 1 TABLET BY MOUTH EVERY DAY    clopidogreL (PLAVIX) 75 mg tab TAKE 1 TABLET BY MOUTH EVERY DAY    acetaminophen (TYLENOL) 500 mg tablet   500 mg, = 1 tab, Tab, Oral, q4hr PRN, Refills 0, not to exceed 3000 mg/day, as needed for pain  Special Instructions: not to exceed 3000 mg/day    finasteride (PROSCAR) 5 mg tablet TAKE 1 TABLET BY MOUTH EVERY DAY    diclofenac (Voltaren) 1 % gel 4 g.    diclofenac (VOLTAREN) 1 % gel Apply 4 g to affected area four (4) times daily.  ACCU-CHEK GIANNI PLUS TEST STRP strip TEST DAILY AS DIRECTED *E11.9*    Wheat Dextrin (BENEFIBER CLEAR) 3 gram/3.5 gram pwpk Take  by mouth.  Bifidobacterium Infantis (ALIGN) 4 mg cap Take  by mouth.  cholecalciferol (VITAMIN D3) 1,000 unit tablet Take  by mouth daily.  aspirin delayed-release 81 mg tablet Take 1 Tab by mouth daily.  pravastatin (PRAVACHOL) 10 mg tablet Take 1 Tab by mouth nightly. No current facility-administered medications for this visit. Social History     Tobacco Use    Smoking status: Former Smoker     Packs/day: 1.00     Years: 30.00     Pack years: 30.00     Quit date: 1992     Years since quittin.6    Smokeless tobacco: Never Used   Substance Use Topics    Alcohol use: No    Drug use: No     Blood pressure 132/69, pulse 65, temperature 98 °F (36.7 °C), temperature source Temporal, resp. rate 18, height 5' 9\" (1.753 m), weight 201 lb 9.6 oz (91.4 kg), SpO2 97 %.       Review of Systems   Constitutional: Negative for chills, fever and malaise/fatigue. Respiratory: Negative for cough and shortness of breath. Cardiovascular: Negative for chest pain, palpitations and leg swelling. Gastrointestinal: Positive for heartburn and nausea. Negative for abdominal pain, blood in stool, constipation, diarrhea and vomiting. Genitourinary: Negative. Neurological: Negative for dizziness and headaches. All other systems reviewed and are negative. Physical Exam  Constitutional:       General: He is not in acute distress. Neck:      Musculoskeletal: Neck supple. Vascular: No carotid bruit. Cardiovascular:      Rate and Rhythm: Normal rate and regular rhythm. Heart sounds: Normal heart sounds. Pulmonary:      Effort: Pulmonary effort is normal.      Breath sounds: Normal breath sounds. Abdominal:      General: There is no distension. Palpations: Abdomen is soft. There is no mass. Tenderness: There is no abdominal tenderness. Musculoskeletal:      Right lower leg: No edema. Left lower leg: No edema. Lymphadenopathy:      Cervical: No cervical adenopathy. Skin:     General: Skin is warm and dry. Neurological:      General: No focal deficit present. Mental Status: He is alert. Psychiatric:         Mood and Affect: Mood normal.         Behavior: Behavior normal.         ASSESSMENT and PLAN  Diagnoses and all orders for this visit:    1. Type 2 diabetes mellitus without complication, without long-term current use of insulin (HCC)  -     HEMOGLOBIN A1C WITH EAG; Future  Controlled based on last A1C. Reviewed diabetic diet and carbohydrate restriction. 2. Primary urethral papillary carcinoma ([facility]  Condition managed by specialists, follow up as scheduled. 3. Hypercholesterolemia  -     LIPID PANEL; Future  -     METABOLIC PANEL, COMPREHENSIVE; Future  Reviewed healthy diet and exercise recommendations. Fasting labs sent. 4. Benign essential HTN  Controlled.   Continue current treatment. 5. Vertebral artery occlusion, unspecified laterality  Condition stable. Continue plavix. 6. Gastroesophageal reflux disease without esophagitis  -     lansoprazole (PREVACID) 30 mg capsule; Take 1 Cap by mouth Daily (before breakfast). Indications: gastroesophageal reflux disease  Moderately stable. Continue current meds, follow up with Dr. Mathias as scheduled. Follow up 6 months. We discussed the expected course, resolution and complications of the diagnosis in detail. Medication risks, benefits, costs, interactions and side effects were discussed. The patient was advised to contact the office for worsening of condition or FTI as anticipated. The patient expressed understanding of the diagnosis and plan.

## 2021-01-06 DIAGNOSIS — E11.9 TYPE 2 DIABETES MELLITUS WITHOUT COMPLICATION, WITHOUT LONG-TERM CURRENT USE OF INSULIN (HCC): ICD-10-CM

## 2021-01-06 RX ORDER — METFORMIN HYDROCHLORIDE 500 MG/1
TABLET, EXTENDED RELEASE ORAL
Qty: 180 TAB | Refills: 1 | Status: SHIPPED | OUTPATIENT
Start: 2021-01-06 | End: 2021-10-15

## 2021-01-25 RX ORDER — CLOPIDOGREL BISULFATE 75 MG/1
TABLET ORAL
Qty: 90 TAB | Refills: 1 | Status: SHIPPED | OUTPATIENT
Start: 2021-01-25 | End: 2021-07-26

## 2021-02-03 DIAGNOSIS — D49.4 BLADDER TUMOR: ICD-10-CM

## 2021-02-03 DIAGNOSIS — R76.8 RHEUMATOID FACTOR POSITIVE: ICD-10-CM

## 2021-02-03 DIAGNOSIS — I65.09 VERTEBRAL ARTERY OCCLUSION, UNSPECIFIED LATERALITY: ICD-10-CM

## 2021-02-03 DIAGNOSIS — Z12.5 SCREENING FOR PROSTATE CANCER: ICD-10-CM

## 2021-02-03 DIAGNOSIS — E66.3 OVERWEIGHT: ICD-10-CM

## 2021-02-03 RX ORDER — AMLODIPINE AND VALSARTAN 10; 320 MG/1; MG/1
TABLET ORAL
Qty: 90 TAB | Refills: 1 | Status: SHIPPED | OUTPATIENT
Start: 2021-02-03 | End: 2021-11-24

## 2021-02-10 DIAGNOSIS — E11.9 TYPE 2 DIABETES MELLITUS WITHOUT COMPLICATION, WITHOUT LONG-TERM CURRENT USE OF INSULIN (HCC): ICD-10-CM

## 2021-02-10 RX ORDER — GLIMEPIRIDE 4 MG/1
TABLET ORAL
Qty: 90 TAB | Refills: 1 | Status: SHIPPED | OUTPATIENT
Start: 2021-02-10 | End: 2021-12-19

## 2021-06-07 ENCOUNTER — OFFICE VISIT (OUTPATIENT)
Dept: FAMILY MEDICINE CLINIC | Age: 81
End: 2021-06-07
Payer: MEDICARE

## 2021-06-07 VITALS
WEIGHT: 204.4 LBS | TEMPERATURE: 97.9 F | HEIGHT: 69 IN | OXYGEN SATURATION: 98 % | HEART RATE: 64 BPM | RESPIRATION RATE: 16 BRPM | DIASTOLIC BLOOD PRESSURE: 66 MMHG | BODY MASS INDEX: 30.27 KG/M2 | SYSTOLIC BLOOD PRESSURE: 135 MMHG

## 2021-06-07 DIAGNOSIS — E11.9 TYPE 2 DIABETES MELLITUS WITHOUT COMPLICATION, WITHOUT LONG-TERM CURRENT USE OF INSULIN (HCC): ICD-10-CM

## 2021-06-07 DIAGNOSIS — Z00.00 MEDICARE ANNUAL WELLNESS VISIT, SUBSEQUENT: Primary | ICD-10-CM

## 2021-06-07 DIAGNOSIS — E78.00 HYPERCHOLESTEROLEMIA: ICD-10-CM

## 2021-06-07 DIAGNOSIS — I65.09 VERTEBRAL ARTERY OCCLUSION, UNSPECIFIED LATERALITY: ICD-10-CM

## 2021-06-07 DIAGNOSIS — K21.9 GASTROESOPHAGEAL REFLUX DISEASE WITHOUT ESOPHAGITIS: ICD-10-CM

## 2021-06-07 DIAGNOSIS — I10 BENIGN ESSENTIAL HTN: ICD-10-CM

## 2021-06-07 DIAGNOSIS — C68.0 PRIMARY URETHRAL PAPILLARY CARCINOMA (HCC): ICD-10-CM

## 2021-06-07 LAB
ALBUMIN SERPL-MCNC: 4.2 G/DL (ref 3.5–5)
ALBUMIN/GLOB SERPL: 1.4 {RATIO} (ref 1.1–2.2)
ALP SERPL-CCNC: 92 U/L (ref 45–117)
ALT SERPL-CCNC: 25 U/L (ref 12–78)
ANION GAP SERPL CALC-SCNC: 4 MMOL/L (ref 5–15)
AST SERPL-CCNC: 16 U/L (ref 15–37)
BILIRUB SERPL-MCNC: 0.5 MG/DL (ref 0.2–1)
BUN SERPL-MCNC: 20 MG/DL (ref 6–20)
BUN/CREAT SERPL: 21 (ref 12–20)
CALCIUM SERPL-MCNC: 9 MG/DL (ref 8.5–10.1)
CHLORIDE SERPL-SCNC: 111 MMOL/L (ref 97–108)
CHOLEST SERPL-MCNC: 114 MG/DL
CO2 SERPL-SCNC: 26 MMOL/L (ref 21–32)
CREAT SERPL-MCNC: 0.95 MG/DL (ref 0.7–1.3)
CREAT UR-MCNC: 71.4 MG/DL
EST. AVERAGE GLUCOSE BLD GHB EST-MCNC: 143 MG/DL
GLOBULIN SER CALC-MCNC: 3 G/DL (ref 2–4)
GLUCOSE SERPL-MCNC: 134 MG/DL (ref 65–100)
HBA1C MFR BLD: 6.6 % (ref 4–5.6)
HDLC SERPL-MCNC: 55 MG/DL
HDLC SERPL: 2.1 {RATIO} (ref 0–5)
LDLC SERPL CALC-MCNC: 46.8 MG/DL (ref 0–100)
MICROALBUMIN UR-MCNC: 0.85 MG/DL
MICROALBUMIN/CREAT UR-RTO: 12 MG/G (ref 0–30)
POTASSIUM SERPL-SCNC: 4.4 MMOL/L (ref 3.5–5.1)
PROT SERPL-MCNC: 7.2 G/DL (ref 6.4–8.2)
SODIUM SERPL-SCNC: 141 MMOL/L (ref 136–145)
TRIGL SERPL-MCNC: 61 MG/DL (ref ?–150)
VLDLC SERPL CALC-MCNC: 12.2 MG/DL

## 2021-06-07 PROCEDURE — G8536 NO DOC ELDER MAL SCRN: HCPCS | Performed by: NURSE PRACTITIONER

## 2021-06-07 PROCEDURE — G8427 DOCREV CUR MEDS BY ELIG CLIN: HCPCS | Performed by: NURSE PRACTITIONER

## 2021-06-07 PROCEDURE — 99213 OFFICE O/P EST LOW 20 MIN: CPT | Performed by: NURSE PRACTITIONER

## 2021-06-07 PROCEDURE — G0439 PPPS, SUBSEQ VISIT: HCPCS | Performed by: NURSE PRACTITIONER

## 2021-06-07 PROCEDURE — 1101F PT FALLS ASSESS-DOCD LE1/YR: CPT | Performed by: NURSE PRACTITIONER

## 2021-06-07 PROCEDURE — G8417 CALC BMI ABV UP PARAM F/U: HCPCS | Performed by: NURSE PRACTITIONER

## 2021-06-07 PROCEDURE — G8432 DEP SCR NOT DOC, RNG: HCPCS | Performed by: NURSE PRACTITIONER

## 2021-06-07 PROCEDURE — G8752 SYS BP LESS 140: HCPCS | Performed by: NURSE PRACTITIONER

## 2021-06-07 PROCEDURE — G0463 HOSPITAL OUTPT CLINIC VISIT: HCPCS | Performed by: NURSE PRACTITIONER

## 2021-06-07 PROCEDURE — G8754 DIAS BP LESS 90: HCPCS | Performed by: NURSE PRACTITIONER

## 2021-06-07 NOTE — PROGRESS NOTES
HISTORY OF PRESENT ILLNESS  Janis Edouard is a 79 y.o. male. HPI  Presents for AWV and follow up chronic health problems. Hypercholesterolemia.  Had to switch from welchol to pravastatin due to change in health insurance. Tolerating medication without adverse side effects. Following healthy diet, stays active. HTN.   Adhering to medication regimen.  T2DM.  Taking glimeperide and metformin as prescribed. Metformin dose reduced after last OV, A1C 5.9. Reports interim -130 range. History of vertebral artery occlusion.  Taking plavix as prescribed.    Bladder cancer.  Followed by oncology and urology.      GERD.  Under care of Dr. Xander Mcfarland.  Continues to have intermittent nausea.  On probiotics and zofran. Taking lansoprazole as prescribed.   Past Medical History:   Diagnosis Date    Calculus of kidney     Cerebral artery occlusion with cerebral infarction ([facility] [address] 10/2016    Elevated cholesterol 5/26/2010    Fracture of right ankle 5/26/2010    HBP (high blood pressure) 5/26/2010    NIDDM (non-insulin dependent diabetes mellitus) 05/26/2010    Occlusion of left vertebral artery 2016    Primary urethral papillary carcinoma (HCC) 2014    Dr Lacho Lam Skin cancer of face     Stenosis of right vertebral artery     Ureteral calculus 5/26/2010     Patient Active Problem List   Diagnosis Code    Type 2 diabetes mellitus without complication, without long-term current use of insulin (HCC) E11.9    Hypercholesterolemia E78.00    Benign essential HTN I10    Vertebral artery occlusion, unspecified laterality I65.09    Gastroesophageal reflux disease without esophagitis K21.9    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     Current Outpatient Medications   Medication Sig    pravastatin (PRAVACHOL) 10 mg tablet TAKE 1 TABLET BY MOUTH EVERY DAY AT NIGHT    glimepiride (AMARYL) 4 mg tablet TAKE 1 TABLET BY MOUTH EVERY DAY IN THE MORNING    amLODIPine-valsartan (EXFORGE)  mg per tablet TAKE 1 TABLET BY MOUTH EVERY DAY    clopidogreL (PLAVIX) 75 mg tab TAKE 1 TABLET BY MOUTH EVERY DAY    metFORMIN ER (GLUCOPHAGE XR) 500 mg tablet TAKE 1 TABLET BY MOUTH TWICE A DAY    lansoprazole (PREVACID) 30 mg capsule Take 1 Cap by mouth Daily (before breakfast). Indications: gastroesophageal reflux disease    clotrimazole-betamethasone (LOTRISONE) topical cream APPLY TO AFFECTED AREA TWICE A DAY    finasteride (PROSCAR) 5 mg tablet TAKE 1 TABLET BY MOUTH EVERY DAY    ACCU-CHEK GIANNI PLUS TEST STRP strip TEST DAILY AS DIRECTED *E11.9*    Wheat Dextrin (BENEFIBER CLEAR) 3 gram/3.5 gram pwpk Take  by mouth.  Bifidobacterium Infantis (ALIGN) 4 mg cap Take  by mouth.  cholecalciferol (VITAMIN D3) 1,000 unit tablet Take  by mouth daily.  aspirin delayed-release 81 mg tablet Take 1 Tab by mouth daily.  ondansetron (ZOFRAN ODT) 4 mg disintegrating tablet TAKE 1 TABLET BY MOUTH EVERY 8 HOURS AS NEEDED FOR NAUSEA    colesevelam (WELCHOL) 625 mg tablet TAKE 3 TABLETS BY MOUTH TWICE A DAY    acetaminophen (TYLENOL) 500 mg tablet   500 mg, = 1 tab, Tab, Oral, q4hr PRN, Refills 0, not to exceed 3000 mg/day, as needed for pain  Special Instructions: not to exceed 3000 mg/day    diclofenac (Voltaren) 1 % gel 4 g.    diclofenac (VOLTAREN) 1 % gel Apply 4 g to affected area four (4) times daily. No current facility-administered medications for this visit. Social History     Tobacco Use    Smoking status: Former Smoker     Packs/day: 1.00     Years: 30.00     Pack years: 30.00     Quit date: 1992     Years since quittin.0    Smokeless tobacco: Never Used   Vaping Use    Vaping Use: Never used   Substance Use Topics    Alcohol use: No    Drug use: No     Blood pressure 135/66, pulse 64, temperature 97.9 °F (36.6 °C), temperature source Temporal, resp. rate 16, height 5' 9\" (1.753 m), weight 204 lb 6.4 oz (92.7 kg), SpO2 98 %.       Review of Systems   Constitutional: Negative for malaise/fatigue. Respiratory: Negative for cough and shortness of breath. Cardiovascular: Negative for chest pain, palpitations and leg swelling. Gastrointestinal: Positive for heartburn (stable) and nausea (intermittent). Neurological: Negative for dizziness and headaches. All other systems reviewed and are negative. Physical Exam  Constitutional:       General: He is not in acute distress. Cardiovascular:      Rate and Rhythm: Normal rate and regular rhythm. Heart sounds: Normal heart sounds. Pulmonary:      Effort: Pulmonary effort is normal.      Breath sounds: Normal breath sounds. Abdominal:      General: There is no distension. Palpations: Abdomen is soft. Tenderness: There is no abdominal tenderness. Musculoskeletal:      Cervical back: Neck supple. Right lower leg: No edema. Left lower leg: No edema. Lymphadenopathy:      Cervical: No cervical adenopathy. Skin:     General: Skin is warm and dry. Neurological:      General: No focal deficit present. Mental Status: He is alert. Psychiatric:         Mood and Affect: Mood normal.         Behavior: Behavior normal.     Diabetic foot exam:     Left Foot:   Visual Exam: normal    Pulse DP: 2+ (normal)   Filament test: normal sensation    Vibratory sensation: normal      Right Foot:   Visual Exam: normal    Pulse DP: 2+ (normal)   Filament test: normal sensation    Vibratory sensation: normal      ASSESSMENT and PLAN  Diagnoses and all orders for this visit:    1. Medicare annual wellness visit, subsequent    2. Type 2 diabetes mellitus without complication, without long-term current use of insulin (HCC)  -     HEMOGLOBIN A1C WITH EAG; Future  -     MICROALBUMIN, UR, RAND W/ MICROALB/CREAT RATIO; Future  -      DIABETES FOOT EXAM  Controlled based on last A1C. Reviewed diabetic diet and carbohydrate restriction. 3. Primary urethral papillary carcinoma (HCC)  Condition stable.   Followed by specialist.    4. Hypercholesterolemia  -     LIPID PANEL; Future  -     METABOLIC PANEL, COMPREHENSIVE; Future  Tolerating pravastatin. Reviewed healthy diet and exercise recommendations. Fasting labs sent. 5. Benign essential HTN  Controlled. Continue current treatment. 6. Vertebral artery occlusion, unspecified laterality  Stable. Continue plavix. 7. Gastroesophageal reflux disease without esophagitis  Stable on PPI. Follow up as scheduled with GI. Follow up six months or sooner prn. We discussed the expected course, resolution and complications of the diagnosis in detail. Medication risks, benefits, costs, interactions and side effects were discussed. The patient was advised to contact the office for worsening of condition or FTI as anticipated. The patient expressed understanding of the diagnosis and plan. This is the Subsequent Medicare Annual Wellness Exam, performed 12 months or more after the Initial AWV or the last Subsequent AWV    I have reviewed the patient's medical history in detail and updated the computerized patient record. Assessment/Plan   Education and counseling provided:  Are appropriate based on today's review and evaluation    1. Medicare annual wellness visit, subsequent  2. Type 2 diabetes mellitus without complication, without long-term current use of insulin (HCC)  -     HEMOGLOBIN A1C WITH EAG; Future  -     MICROALBUMIN, UR, RAND W/ MICROALB/CREAT RATIO; Future  -      DIABETES FOOT EXAM  3. Primary urethral papillary carcinoma ([facility] [address]  4. Hypercholesterolemia  -     LIPID PANEL; Future  -     METABOLIC PANEL, COMPREHENSIVE; Future  5. Benign essential HTN  6. Vertebral artery occlusion, unspecified laterality  7.  Gastroesophageal reflux disease without esophagitis       Depression Risk Factor Screening     3 most recent PHQ Screens 6/7/2021   Little interest or pleasure in doing things Not at all   Feeling down, depressed, irritable, or hopeless Not at all Total Score PHQ 2 0       Alcohol Risk Screen    Do you average more than 1 drink per night or more than 7 drinks a week: No    In the past three months have you have had more than 4 drinks containing alcohol on one occasion: No        Functional Ability and Level of Safety    Hearing: The patient wears hearing aids. Activities of Daily Living: The home contains: no safety equipment. Patient does total self care      Ambulation: with no difficulty     Fall Risk:  Fall Risk Assessment, last 12 mths 6/7/2021   Able to walk? Yes   Fall in past 12 months? 0   Do you feel unsteady?  0   Are you worried about falling 0      Abuse Screen:  Patient is not abused       Cognitive Screening    Has your family/caregiver stated any concerns about your memory: no     Cognitive Screening: Normal - Verbal Fluency Test    Health Maintenance Due     Health Maintenance Due   Topic Date Due    Eye Exam Retinal or Dilated  06/25/2016    MICROALBUMIN Q1  07/02/2021    Medicare Yearly Exam  07/03/2021       Patient Care Team   Patient Care Team:  Alison Garza NP as PCP - General (Family Medicine)  Alison Gar[facility] NP as PCP [facility]  Madhu Rick MD (Urology)    History     Patient Active Problem List   Diagnosis Code    Type 2 diabetes mellitus without complication, without long-term current use of insulin ([address] E11.9    Hypercholesterolemia E78.00    Benign essential HTN I10    Vertebral artery occlusion, unspecified laterality I65.09    Gastroesophageal reflux disease without esophagitis K21.9    Primary urethral papillary carcinoma ([address] C68.0     Past Medical History:   Diagnosis Date    Calculus of kidney     Cerebral artery occlusion with cerebral infarction ([address] 10/2016    Elevated cholesterol 5/26/2010    Fracture of right ankle 5/26/2010    HBP (high blood pressure) 5/26/2010    NIDDM (non-insulin dependent diabetes mellitus) 05/26/2010    Occlusion of left vertebral artery 2016    Primary urethral papillary carcinoma ([facility] [address] 2014    Dr Claudell Battles Skin cancer of face     Stenosis of right vertebral artery     Ureteral calculus 5/26/2010      Past Surgical History:   Procedure Laterality Date    ENDOSCOPY, COLON, DIAGNOSTIC  02; 2/07; 2012    polyp 5 yr    HX TONSILLECTOMY      HX UROLOGICAL  2014    bladder    HX UROLOGICAL  2015    bladder    HX UROLOGICAL  04/07/2020    transurethral resection of recurrent bladder tumor Dr. Dwight Latham     Current Outpatient Medications   Medication Sig Dispense Refill    pravastatin (PRAVACHOL) 10 mg tablet TAKE 1 TABLET BY MOUTH EVERY DAY AT NIGHT 90 Tab 1    glimepiride (AMARYL) 4 mg tablet TAKE 1 TABLET BY MOUTH EVERY DAY IN THE MORNING 90 Tab 1    amLODIPine-valsartan (EXFORGE)  mg per tablet TAKE 1 TABLET BY MOUTH EVERY DAY 90 Tab 1    clopidogreL (PLAVIX) 75 mg tab TAKE 1 TABLET BY MOUTH EVERY DAY 90 Tab 1    metFORMIN ER (GLUCOPHAGE XR) 500 mg tablet TAKE 1 TABLET BY MOUTH TWICE A  Tab 1    lansoprazole (PREVACID) 30 mg capsule Take 1 Cap by mouth Daily (before breakfast). Indications: gastroesophageal reflux disease 90 Cap 1    clotrimazole-betamethasone (LOTRISONE) topical cream APPLY TO AFFECTED AREA TWICE A DAY 60 g 0    finasteride (PROSCAR) 5 mg tablet TAKE 1 TABLET BY MOUTH EVERY DAY      ACCU-CHEK GIANNI PLUS TEST STRP strip TEST DAILY AS DIRECTED *E11.9* 100 Strip 0    Wheat Dextrin (BENEFIBER CLEAR) 3 gram/3.5 gram pwpk Take  by mouth.  Bifidobacterium Infantis (ALIGN) 4 mg cap Take  by mouth.  cholecalciferol (VITAMIN D3) 1,000 unit tablet Take  by mouth daily.  aspirin delayed-release 81 mg tablet Take 1 Tab by mouth daily.  30 Tab 0    ondansetron (ZOFRAN ODT) 4 mg disintegrating tablet TAKE 1 TABLET BY MOUTH EVERY 8 HOURS AS NEEDED FOR NAUSEA 30 Tab 0    colesevelam (WELCHOL) 625 mg tablet TAKE 3 TABLETS BY MOUTH TWICE A  Tab 0    acetaminophen (TYLENOL) 500 mg tablet   500 mg, = 1 tab, Tab, Oral, q4hr PRN, Refills 0, not to exceed 3000 mg/day, as needed for pain  Special Instructions: not to exceed 3000 mg/day      diclofenac (Voltaren) 1 % gel 4 g.      diclofenac (VOLTAREN) 1 % gel Apply 4 g to affected area four (4) times daily. 100 g 0     Allergies   Allergen Reactions    Benicar [Olmesartan] Unable to Obtain    Moexipril Other (comments)    Pcn [Penicillins] Unable to Obtain    Simvastatin Myalgia    Simvastatin Myalgia    Uniretic [Moexipril-Hydrochlorothiazide] Other (comments)     GI upset       History reviewed. No pertinent family history.   Social History     Tobacco Use    Smoking status: Former Smoker     Packs/day: 1.00     Years: 30.00     Pack years: 30.00     Quit date: 1992     Years since quittin.0    Smokeless tobacco: Never Used   Substance Use Topics    Alcohol use: No         Too Guevara NP

## 2021-06-07 NOTE — PATIENT INSTRUCTIONS
Medicare Wellness Visit, Male The best way to live healthy is to have a lifestyle where you eat a well-balanced diet, exercise regularly, limit alcohol use, and quit all forms of tobacco/nicotine, if applicable. Regular preventive services are another way to keep healthy. Preventive services (vaccines, screening tests, monitoring & exams) can help personalize your care plan, which helps you manage your own care. Screening tests can find health problems at the earliest stages, when they are easiest to treat. [facility] follows the current, evidence-based guidelines published by the [facility]y ([facility]) when recommending preventive services for our patients. Because we follow these guidelines, sometimes recommendations change over time as research supports it. (For example, a prostate screening blood test is no longer routinely recommended for men with no symptoms). Of course, you and your doctor may decide to screen more often for some diseases, based on your risk and co-morbidities (chronic disease you are already diagnosed with). Preventive services for you include: - Medicare offers their members a free annual wellness visit, which is time for you and your primary care provider to discuss and plan for your preventive service needs. Take advantage of this benefit every year! 
-All adults over age 72 should receive the recommended pneumonia vaccines. Current USPSTF guidelines recommend a series of two vaccines for the best pneumonia protection.  
-All adults should have a flu vaccine yearly and tetanus vaccine every 10 years. 
-All adults age 48 and older should receive the shingles vaccines (series of two vaccines).       
-All adults age 38-68 who are overweight should have a diabetes screening test once every three years.  
-Other screening tests & preventive services for persons with diabetes include: an eye exam to screen for diabetic retinopathy, a kidney function test, a foot exam, and stricter control over your cholesterol.  
-Cardiovascular screening for adults with routine risk involves an electrocardiogram (ECG) at intervals determined by the provider.  
-Colorectal cancer screening should be done for adults age 54-65 with no increased risk factors for colorectal cancer. There are a number of acceptable methods of screening for this type of cancer. Each test has its own benefits and drawbacks. Discuss with your provider what is most appropriate for you during your annual wellness visit. The different tests include: colonoscopy (considered the best screening method), a fecal occult blood test, a fecal DNA test, and sigmoidoscopy. 
-All adults born between [facility] should be screened once for Hepatitis C. 
-An Abdominal Aortic Aneurysm (AAA) Screening is recommended for men age 73-68 who has ever smoked in their lifetime. Here is a list of your current Health Maintenance items (your personalized list of preventive services) with a due date: 
Health Maintenance Due Topic Date Due [facility] Eye Exam  06/25/2016  Albumin Urine Test  07/02/2021

## 2021-06-07 NOTE — PROGRESS NOTES
No chief complaint on file. 1. Have you been to the ER, urgent care clinic since your last visit? Hospitalized since your last visit? No    2. Have you seen or consulted any other health care providers outside of the [address] [place] [address] since your last visit? Include any pap smears or colon screening.  No    3 most recent PHQ Screens 10/2/2020   Little interest or pleasure in doing things Not at all   Feeling down, depressed, irritable, or hopeless Not at all   Total Score PHQ 2 0

## 2021-06-23 DIAGNOSIS — K21.9 GASTROESOPHAGEAL REFLUX DISEASE WITHOUT ESOPHAGITIS: ICD-10-CM

## 2021-06-23 RX ORDER — LANSOPRAZOLE 30 MG/1
CAPSULE, DELAYED RELEASE ORAL
Qty: 90 CAPSULE | Refills: 1 | Status: SHIPPED | OUTPATIENT
Start: 2021-06-23 | End: 2021-12-20

## 2021-07-26 RX ORDER — CLOPIDOGREL BISULFATE 75 MG/1
TABLET ORAL
Qty: 90 TABLET | Refills: 1 | Status: SHIPPED | OUTPATIENT
Start: 2021-07-26 | End: 2022-01-18

## 2021-11-19 ENCOUNTER — TELEPHONE (OUTPATIENT)
Dept: NEUROLOGY | Age: 81
End: 2021-11-19

## 2021-11-19 NOTE — TELEPHONE ENCOUNTER
I have not seen this patient since 2018. Not appropriate for me to make any clearance at this time. Verified patient with 2 identifiers   Spoke with patient regarding results and recommendations. Voiced understanding. He states he is not going to take the metoprolol because it causes him to cough up blood. He also feels eliquis is causing his gout and he is extremely frustrated. .. he states he will call on Monday 3/1/21 to schedule appt with Dr Blanca Minaya.

## 2021-11-19 NOTE — TELEPHONE ENCOUNTER
Patient is calling to get an okay from Dr. Anai Fang to have a tooth pulled. They called him today and stated he had to have a release from Dr. Anai Fang before they will proceed with extraction. He is having to have it removed due to an infection. Appointment on Monday (11/22) with Dr. Felicia Arreguin, [phone], [facility] [phone].

## 2021-12-09 ENCOUNTER — OFFICE VISIT (OUTPATIENT)
Dept: FAMILY MEDICINE CLINIC | Age: 81
End: 2021-12-09
Payer: MEDICARE

## 2021-12-09 VITALS
SYSTOLIC BLOOD PRESSURE: 138 MMHG | OXYGEN SATURATION: 97 % | HEIGHT: 69 IN | DIASTOLIC BLOOD PRESSURE: 70 MMHG | TEMPERATURE: 98.3 F | BODY MASS INDEX: 30.16 KG/M2 | HEART RATE: 65 BPM | WEIGHT: 203.6 LBS | RESPIRATION RATE: 16 BRPM

## 2021-12-09 DIAGNOSIS — E78.00 HYPERCHOLESTEROLEMIA: ICD-10-CM

## 2021-12-09 DIAGNOSIS — I10 BENIGN ESSENTIAL HTN: ICD-10-CM

## 2021-12-09 DIAGNOSIS — Z23 NEEDS FLU SHOT: ICD-10-CM

## 2021-12-09 DIAGNOSIS — E11.9 TYPE 2 DIABETES MELLITUS WITHOUT COMPLICATION, WITHOUT LONG-TERM CURRENT USE OF INSULIN (HCC): Primary | ICD-10-CM

## 2021-12-09 DIAGNOSIS — I65.09 VERTEBRAL ARTERY OCCLUSION, UNSPECIFIED LATERALITY: ICD-10-CM

## 2021-12-09 DIAGNOSIS — C68.0 PRIMARY URETHRAL PAPILLARY CARCINOMA (HCC): ICD-10-CM

## 2021-12-09 DIAGNOSIS — K21.9 GASTROESOPHAGEAL REFLUX DISEASE WITHOUT ESOPHAGITIS: ICD-10-CM

## 2021-12-09 LAB
ALBUMIN SERPL-MCNC: 3.9 G/DL (ref 3.5–5)
ALBUMIN/GLOB SERPL: 1.3 {RATIO} (ref 1.1–2.2)
ALP SERPL-CCNC: 75 U/L (ref 45–117)
ALT SERPL-CCNC: 32 U/L (ref 12–78)
ANION GAP SERPL CALC-SCNC: 3 MMOL/L (ref 5–15)
AST SERPL-CCNC: 17 U/L (ref 15–37)
BILIRUB SERPL-MCNC: 0.6 MG/DL (ref 0.2–1)
BUN SERPL-MCNC: 19 MG/DL (ref 6–20)
BUN/CREAT SERPL: 20 (ref 12–20)
CALCIUM SERPL-MCNC: 9.1 MG/DL (ref 8.5–10.1)
CHLORIDE SERPL-SCNC: 112 MMOL/L (ref 97–108)
CHOLEST SERPL-MCNC: 100 MG/DL
CO2 SERPL-SCNC: 25 MMOL/L (ref 21–32)
CREAT SERPL-MCNC: 0.97 MG/DL (ref 0.7–1.3)
EST. AVERAGE GLUCOSE BLD GHB EST-MCNC: 146 MG/DL
GLOBULIN SER CALC-MCNC: 3.1 G/DL (ref 2–4)
GLUCOSE SERPL-MCNC: 119 MG/DL (ref 65–100)
HBA1C MFR BLD: 6.7 % (ref 4–5.6)
HDLC SERPL-MCNC: 54 MG/DL
HDLC SERPL: 1.9 {RATIO} (ref 0–5)
LDLC SERPL CALC-MCNC: 33.4 MG/DL (ref 0–100)
POTASSIUM SERPL-SCNC: 4.4 MMOL/L (ref 3.5–5.1)
PROT SERPL-MCNC: 7 G/DL (ref 6.4–8.2)
SODIUM SERPL-SCNC: 140 MMOL/L (ref 136–145)
TRIGL SERPL-MCNC: 63 MG/DL (ref ?–150)
VLDLC SERPL CALC-MCNC: 12.6 MG/DL

## 2021-12-09 PROCEDURE — 1101F PT FALLS ASSESS-DOCD LE1/YR: CPT | Performed by: NURSE PRACTITIONER

## 2021-12-09 PROCEDURE — G8754 DIAS BP LESS 90: HCPCS | Performed by: NURSE PRACTITIONER

## 2021-12-09 PROCEDURE — G8752 SYS BP LESS 140: HCPCS | Performed by: NURSE PRACTITIONER

## 2021-12-09 PROCEDURE — 90694 VACC AIIV4 NO PRSRV 0.5ML IM: CPT | Performed by: NURSE PRACTITIONER

## 2021-12-09 PROCEDURE — 99214 OFFICE O/P EST MOD 30 MIN: CPT | Performed by: NURSE PRACTITIONER

## 2021-12-09 PROCEDURE — G8427 DOCREV CUR MEDS BY ELIG CLIN: HCPCS | Performed by: NURSE PRACTITIONER

## 2021-12-09 PROCEDURE — G8536 NO DOC ELDER MAL SCRN: HCPCS | Performed by: NURSE PRACTITIONER

## 2021-12-09 PROCEDURE — G8417 CALC BMI ABV UP PARAM F/U: HCPCS | Performed by: NURSE PRACTITIONER

## 2021-12-09 PROCEDURE — G8432 DEP SCR NOT DOC, RNG: HCPCS | Performed by: NURSE PRACTITIONER

## 2021-12-09 PROCEDURE — G0463 HOSPITAL OUTPT CLINIC VISIT: HCPCS | Performed by: NURSE PRACTITIONER

## 2021-12-09 NOTE — PROGRESS NOTES
HISTORY OF PRESENT ILLNESS  Daniel Sanchez is a 79 y.o. male. HPI  6 month follow up health problems. Hypercholesterolemia.  Taking prescribed pravachol. Following healthy diet, stays active. HTN.   Adhering to medication regimen. BP slightly elevated today in office. Improved with recheck. T2DM.  Taking glimeperide and metformin as prescribed. Has reduced sugar and processed carbs in diet. History of vertebral artery occlusion.  Taking plavix as prescribed.  Needs to have dental procedure. Needs authorization to temporarily stop plavix. Prior recommendations from neurology were to not stop med for any reason due to risk of stroke. Bladder cancer.  Followed by oncology and urology.      GERD.  Under care of Dr. Wilda Blair.  Continues to have intermittent nausea.  On probiotics and zofran. Taking lansoprazole as prescribed. 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 Appointment due.  pravastatin (PRAVACHOL) 10 mg tablet Take 1 Tablet by mouth nightly. Appointment due.  clopidogreL (PLAVIX) 75 mg tab TAKE 1 TABLET BY MOUTH EVERY DAY    lansoprazole (PREVACID) 30 mg capsule TAKE 1 CAP BY MOUTH DAILY (BEFORE BREAKFAST).     glimepiride (AMARYL) 4 mg tablet TAKE 1 TABLET BY MOUTH EVERY DAY IN THE MORNING    clotrimazole-betamethasone (LOTRISONE) topical cream APPLY TO AFFECTED AREA TWICE A DAY    ondansetron (ZOFRAN ODT) 4 mg disintegrating tablet TAKE 1 TABLET BY MOUTH EVERY 8 HOURS AS NEEDED FOR NAUSEA    finasteride (PROSCAR) 5 mg tablet TAKE 1 TABLET BY MOUTH EVERY DAY    diclofenac (VOLTAREN) 1 % gel Apply 4 g to affected area four (4) times daily.  ACCU-CHEK GIANNI PLUS TEST STRP strip TEST DAILY AS DIRECTED *E11.9*    Bifidobacterium Infantis (ALIGN) 4 mg cap Take  by mouth.  cholecalciferol (VITAMIN D3) 1,000 unit tablet Take  by mouth daily.  aspirin delayed-release 81 mg tablet Take 1 Tab by mouth daily. No current facility-administered medications for this visit. Social History     Tobacco Use    Smoking status: Former Smoker     Packs/day: 1.00     Years: 30.00     Pack years: 30.00     Quit date: 1992     Years since quittin.5    Smokeless tobacco: Never Used   Vaping Use    Vaping Use: Never used   Substance Use Topics    Alcohol use: No    Drug use: No     Blood pressure 138/70, pulse 65, temperature 98.3 °F (36.8 °C), temperature source Temporal, resp. rate 16, height 5' 9\" (1.753 m), weight 203 lb 9.6 oz (92.4 kg), SpO2 97 %. Review of Systems   Respiratory: Negative for cough and shortness of breath. Cardiovascular: Negative for chest pain, palpitations and leg swelling. Gastrointestinal: Positive for heartburn (stable). Negative for abdominal pain and blood in stool. Neurological: Negative for dizziness and headaches. Endo/Heme/Allergies: Does not bruise/bleed easily. All other systems reviewed and are negative. Physical Exam  Constitutional:       General: He is not in acute distress. Comments: overweight   Cardiovascular:      Rate and Rhythm: Normal rate and regular rhythm. Heart sounds: Normal heart sounds. Pulmonary:      Effort: Pulmonary effort is normal.      Breath sounds: Normal breath sounds. Musculoskeletal:      Cervical back: Neck supple. Right lower leg: No edema. Left lower leg: No edema.    Lymphadenopathy:      Cervical: No cervical adenopathy. Skin:     General: Skin is warm and dry. Neurological:      Mental Status: He is alert. Gait: Gait normal.   Psychiatric:         Mood and Affect: Mood normal.         ASSESSMENT and PLAN  Diagnoses and all orders for this visit:    1. Type 2 diabetes mellitus without complication, without long-term current use of insulin (HCC)  -     HEMOGLOBIN A1C WITH EAG; Future  -     METABOLIC PANEL, COMPREHENSIVE; Future  Controlled based on last A1C. Reviewed diabetic diet and carbohydrate restriction. 2. Needs flu shot  -     FLU (FLUAD QUAD INFLUENZA VACCINE,QUAD,ADJUVANTED)    3. Primary urethral papillary carcinoma (HCC)  Condition stable. Followed by urology. 4. Hypercholesterolemia  -     LIPID PANEL; Future  -     METABOLIC PANEL, COMPREHENSIVE; Future  Fasting labs sent. 5. Benign essential HTN  -     METABOLIC PANEL, COMPREHENSIVE; Future  Moderate control. Recommend interim BP monitoring and record. Report if > 120/80 consistently. 6. Vertebral artery occlusion, unspecified laterality  Stable on plavix. Recommend follow up with neurologist to evaluate whether anticoagulant is safe to stop for procedure. 7. Gastroesophageal reflux disease without esophagitis  Stable on PPI. GERD precautions reviewed. Follow up 6 months. We discussed the expected course, resolution and complications of the diagnosis in detail. Medication risks, benefits, costs, interactions and side effects were discussed. The patient was advised to contact the office for worsening of condition or FTI as anticipated. The patient expressed understanding of the diagnosis and plan.

## 2021-12-09 NOTE — PROGRESS NOTES
Chief Complaint   Patient presents with    Diabetes     follow up       1. \"Have you been to the ER, urgent care clinic since your last visit? Hospitalized since your last visit? \" No    2. \"Have you seen or consulted any other health care providers outside of the [address] [place] [address] since your last visit? \" No     3. For patients over 45: Has the patient had a colonoscopy? Yes, HM satisfied with blue hyperlink     If the patient is female:    4. For patients over 36: Has the patient had a mammogram? NA based on age or sex    11. For patients over 21: Has the patient had a pap smear?  NA based on age or sex    3 most recent PHQ Screens 12/9/2021   Little interest or pleasure in doing things Not at all   Feeling down, depressed, irritable, or hopeless Not at all   Total Score PHQ 2 0

## 2021-12-20 DIAGNOSIS — K21.9 GASTROESOPHAGEAL REFLUX DISEASE WITHOUT ESOPHAGITIS: ICD-10-CM

## 2021-12-20 RX ORDER — LANSOPRAZOLE 30 MG/1
CAPSULE, DELAYED RELEASE ORAL
Qty: 90 CAPSULE | Refills: 1 | Status: SHIPPED | OUTPATIENT
Start: 2021-12-20 | End: 2022-06-13

## 2021-12-28 DIAGNOSIS — L30.9 DERMATITIS: ICD-10-CM

## 2021-12-28 RX ORDER — CLOTRIMAZOLE AND BETAMETHASONE DIPROPIONATE 10; .64 MG/G; MG/G
CREAM TOPICAL
Qty: 60 G | Refills: 0 | Status: SHIPPED | OUTPATIENT
Start: 2021-12-28

## 2022-01-18 RX ORDER — CLOPIDOGREL BISULFATE 75 MG/1
TABLET ORAL
Qty: 90 TABLET | Refills: 1 | Status: SHIPPED | OUTPATIENT
Start: 2022-01-18 | End: 2022-07-24

## 2022-01-20 ENCOUNTER — TELEPHONE (OUTPATIENT)
Dept: FAMILY MEDICINE CLINIC | Age: 82
End: 2022-01-20

## 2022-01-20 ENCOUNTER — OFFICE VISIT (OUTPATIENT)
Dept: FAMILY MEDICINE CLINIC | Age: 82
End: 2022-01-20
Payer: MEDICARE

## 2022-01-20 VITALS
RESPIRATION RATE: 16 BRPM | SYSTOLIC BLOOD PRESSURE: 140 MMHG | HEIGHT: 69 IN | WEIGHT: 208 LBS | HEART RATE: 87 BPM | DIASTOLIC BLOOD PRESSURE: 60 MMHG | TEMPERATURE: 98.6 F | OXYGEN SATURATION: 96 % | BODY MASS INDEX: 30.81 KG/M2

## 2022-01-20 DIAGNOSIS — L02.92 BOIL: Primary | ICD-10-CM

## 2022-01-20 PROCEDURE — G8427 DOCREV CUR MEDS BY ELIG CLIN: HCPCS | Performed by: NURSE PRACTITIONER

## 2022-01-20 PROCEDURE — G8754 DIAS BP LESS 90: HCPCS | Performed by: NURSE PRACTITIONER

## 2022-01-20 PROCEDURE — 1101F PT FALLS ASSESS-DOCD LE1/YR: CPT | Performed by: NURSE PRACTITIONER

## 2022-01-20 PROCEDURE — G0463 HOSPITAL OUTPT CLINIC VISIT: HCPCS | Performed by: NURSE PRACTITIONER

## 2022-01-20 PROCEDURE — G8432 DEP SCR NOT DOC, RNG: HCPCS | Performed by: NURSE PRACTITIONER

## 2022-01-20 PROCEDURE — G8417 CALC BMI ABV UP PARAM F/U: HCPCS | Performed by: NURSE PRACTITIONER

## 2022-01-20 PROCEDURE — G8536 NO DOC ELDER MAL SCRN: HCPCS | Performed by: NURSE PRACTITIONER

## 2022-01-20 PROCEDURE — 99213 OFFICE O/P EST LOW 20 MIN: CPT | Performed by: NURSE PRACTITIONER

## 2022-01-20 PROCEDURE — G8753 SYS BP > OR = 140: HCPCS | Performed by: NURSE PRACTITIONER

## 2022-01-20 RX ORDER — DOXYCYCLINE 100 MG/1
100 TABLET ORAL 2 TIMES DAILY
Qty: 20 TABLET | Refills: 0 | Status: SHIPPED | OUTPATIENT
Start: 2022-01-20 | End: 2022-01-30

## 2022-01-20 NOTE — PROGRESS NOTES
Chief Complaint   Patient presents with    Skin Problem     1. Have you been to the ER, urgent care clinic since your last visit? Hospitalized since your last visit?  no  2. Have you seen or consulted any other health care providers outside of the [address] [place] [address] since your last visit? Include any pap smears or colon screening.    no

## 2022-01-20 NOTE — TELEPHONE ENCOUNTER
Chief Complaint   Patient presents with    Rash     symptoms has been ongoing. [place] Single daughter states it is a knot that was on neck ( calcium build) was instructed to apply warm compress.   Area is now draining      Patient scheduled with LORY Suero at 3:30 pm.  Sherita Neri LPN

## 2022-01-20 NOTE — TELEPHONE ENCOUNTER
----- Message from Melba Perez sent at 1/20/2022 12:03 PM EST -----  Subject: Appointment Request    Reason for Call: Urgent (Patient Request) No Script    QUESTIONS  Type of Appointment? Established Patient  Reason for appointment request? Available appointments did not meet   patient need  Additional Information for Provider? Patient has a rash that hurts on his   back. He would like to be seen today or send over some meds to help with   the rash. Patient request a call back. ---------------------------------------------------------------------------  --------------  Ashish BRIONES  What is the best way for the office to contact you? OK to leave message on   voicemail  Preferred Call Back Phone Number? [phone]  ---------------------------------------------------------------------------  --------------  SCRIPT ANSWERS  Relationship to Patient? Self  (Is the patient requesting to see the provider for a procedure?)? No  (Is the patient requesting to see the provider urgently  today or   tomorrow. )? Yes  Have you been diagnosed with, awaiting test results for, or told that you   are suspected of having COVID-19 (Coronavirus)? (If patient has tested   negative or was tested as a requirement for work, school, or travel and   not based on symptoms, answer no)? No  Within the past two weeks have you developed any of the following symptoms   (answer no if symptoms have been present longer than 2 weeks or began   more than 2 weeks ago)? Fever or Chills, Cough, Shortness of breath or   difficulty breathing, Loss of taste or smell, Sore throat, Nasal   congestion, Sneezing or runny nose, Fatigue or generalized body aches   (answer no if pain is specific to a body part e.g. back pain), Diarrhea,   Headache? No  Have you had close contact with someone with COVID-19 in the last 14 days? No  (Service Expert  click yes below to proceed with [employer] As Usual   Scheduling)?  Yes

## 2022-01-20 NOTE — PROGRESS NOTES
[facility] Note  Subjective:      Christopher Karimi is a 79 y.o. male who presents for skin infection behind his neck x 6 days. He called on Dr and  was advised to apply warm compress. She states it is draining . He doesn't want to go to ER to halima the boil at this time    Past Medical History:   Diagnosis Date    Calculus of kidney     Cerebral artery occlusion with cerebral infarction ([facility] [address] 10/2016    Elevated cholesterol 5/26/2010    Fracture of right ankle 5/26/2010    HBP (high blood pressure) 5/26/2010    NIDDM (non-insulin dependent diabetes mellitus) 05/26/2010    Occlusion of left vertebral artery 2016    Primary urethral papillary carcinoma ([facility] [address] 2014    Dr Lima Bautista Skin cancer of face     Stenosis of right vertebral artery     Ureteral calculus 5/26/2010     Past Surgical History:   Procedure Laterality Date    ENDOSCOPY, COLON, DIAGNOSTIC  02; 2/07; 2012    polyp 5 yr    HX TONSILLECTOMY      HX UROLOGICAL  2014    bladder    HX UROLOGICAL  2015    bladder    HX UROLOGICAL  04/07/2020    transurethral resection of recurrent bladder tumor Dr. Greyson Shaw       Current Outpatient Medications   Medication Sig Dispense Refill    doxycycline (ADOXA) 100 mg tablet Take 1 Tablet by mouth two (2) times a day for 10 days. 20 Tablet 0    clopidogreL (PLAVIX) 75 mg tab TAKE 1 TABLET BY MOUTH EVERY DAY 90 Tablet 1    metFORMIN ER (GLUCOPHAGE XR) 500 mg tablet Take 1 Tablet by mouth two (2) times daily (with meals). 180 Tablet 1    clotrimazole-betamethasone (LOTRISONE) topical cream APPLY TO AFFECTED AREA TWICE A DAY 60 g 0    lansoprazole (PREVACID) 30 mg capsule TAKE 1 CAP BY MOUTH DAILY (BEFORE BREAKFAST). 90 Capsule 1    pravastatin (PRAVACHOL) 10 mg tablet Take 1 Tablet by mouth nightly.  90 Tablet 1    glimepiride (AMARYL) 4 mg tablet TAKE 1 TABLET BY MOUTH EVERY DAY IN THE MORNING 90 Tablet 1    amLODIPine-valsartan (EXFORGE)  mg per tablet TAKE 1 TABLET BY MOUTH EVERY DAY 90 Tablet 0    ondansetron (ZOFRAN ODT) 4 mg disintegrating tablet TAKE 1 TABLET BY MOUTH EVERY 8 HOURS AS NEEDED FOR NAUSEA 30 Tab 0    finasteride (PROSCAR) 5 mg tablet TAKE 1 TABLET BY MOUTH EVERY DAY      diclofenac (VOLTAREN) 1 % gel Apply 4 g to affected area four (4) times daily. 100 g 0    ACCU-CHEK GIANNI PLUS TEST STRP strip TEST DAILY AS DIRECTED *E11.9* 100 Strip 0    Bifidobacterium Infantis (ALIGN) 4 mg cap Take  by mouth.  cholecalciferol (VITAMIN D3) 1,000 unit tablet Take  by mouth daily.  aspirin delayed-release 81 mg tablet Take 1 Tab by mouth daily. 30 Tab 0     Allergies   Allergen Reactions    Benicar [Olmesartan] Unable to Obtain    Moexipril Other (comments)    Pcn [Penicillins] Unable to Obtain    Simvastatin Myalgia    Simvastatin Myalgia    Uniretic [Moexipril-Hydrochlorothiazide] Other (comments)     GI upset       ROS:   Complete review of systems was reviewed with pertinent information listed in HPI. Review of Systems   Constitutional: Negative. HENT: Negative. Respiratory: Negative. Cardiovascular: Negative. Genitourinary: Negative. Objective:     Visit Vitals  BP (!) 140/60   Pulse 87   Temp 98.6 °F (37 °C) (Temporal)   Resp 16   Ht 5' 9\" (1.753 m)   Wt 208 lb (94.3 kg)   SpO2 96%   BMI 30.72 kg/m²       Vitals and Nurse Documentation reviewed. Physical Exam  Constitutional:       Appearance: Normal appearance. He is obese. HENT:      Mouth/Throat:      Mouth: Mucous membranes are moist.   Cardiovascular:      Rate and Rhythm: Normal rate and regular rhythm. Pulses: Normal pulses. Heart sounds: Normal heart sounds. No murmur heard. Pulmonary:      Breath sounds: Normal breath sounds. Abdominal:      General: Bowel sounds are normal.      Palpations: Abdomen is soft. Musculoskeletal:      Cervical back: Normal range of motion and neck supple. Skin:     Findings: Erythema present.       Comments: Boil over behind his neck, over calcified cyst , hard erythematous and painful to touch   Neurological:      Mental Status: He is alert. Psychiatric:         Mood and Affect: Mood normal.         Assessment/Plan:     Diagnoses and all orders for this visit:    1. Boil  -     doxycycline (ADOXA) 100 mg tablet; Take 1 Tablet by mouth two (2) times a day for 10 days.  -     REFERRAL TO GENERAL SURGERY        -     Continue with warm compress        -     Go to Er if it is not improving  2.  BMI 30.0-30.9,adult          Pt expressed understanding with the diagnosis and plan        Discussed expected course/resolution/complications of diagnosis in detail with patient.    Medication risks/benefits/costs/interactions/alternatives discussed with patient.    Pt was given an after visit summary which includes diagnoses, current medications & vitals.  Pt expressed understanding with the diagnosis and plan

## 2022-01-26 ENCOUNTER — OFFICE VISIT (OUTPATIENT)
Dept: SURGERY | Age: 82
End: 2022-01-26
Payer: MEDICARE

## 2022-01-26 VITALS
TEMPERATURE: 97.7 F | SYSTOLIC BLOOD PRESSURE: 138 MMHG | HEIGHT: 69 IN | BODY MASS INDEX: 30.36 KG/M2 | WEIGHT: 205 LBS | HEART RATE: 75 BPM | DIASTOLIC BLOOD PRESSURE: 69 MMHG | OXYGEN SATURATION: 95 % | RESPIRATION RATE: 18 BRPM

## 2022-01-26 DIAGNOSIS — L08.9 INFECTED SEBACEOUS CYST: Primary | ICD-10-CM

## 2022-01-26 DIAGNOSIS — L72.3 INFECTED SEBACEOUS CYST: Primary | ICD-10-CM

## 2022-01-26 PROCEDURE — G8752 SYS BP LESS 140: HCPCS | Performed by: SURGERY

## 2022-01-26 PROCEDURE — 1101F PT FALLS ASSESS-DOCD LE1/YR: CPT | Performed by: SURGERY

## 2022-01-26 PROCEDURE — G8536 NO DOC ELDER MAL SCRN: HCPCS | Performed by: SURGERY

## 2022-01-26 PROCEDURE — G8427 DOCREV CUR MEDS BY ELIG CLIN: HCPCS | Performed by: SURGERY

## 2022-01-26 PROCEDURE — G8754 DIAS BP LESS 90: HCPCS | Performed by: SURGERY

## 2022-01-26 PROCEDURE — G8417 CALC BMI ABV UP PARAM F/U: HCPCS | Performed by: SURGERY

## 2022-01-26 PROCEDURE — G8510 SCR DEP NEG, NO PLAN REQD: HCPCS | Performed by: SURGERY

## 2022-01-26 PROCEDURE — 99203 OFFICE O/P NEW LOW 30 MIN: CPT | Performed by: SURGERY

## 2022-01-26 NOTE — LETTER
1/26/2022    Patient: Tian David   YOB: 1940   Date of Visit: 1/26/2022     Nery Gonzales NP  [address] [address]  Via In Basket    Dear Nery Gonzales NP,      Thank you for referring Mr. Kirsty Love to [facility] for evaluation. My notes for this consultation are attached. If you have questions, please do not hesitate to call me. I look forward to following your patient along with you.       Sincerely,    Oly Doyle MD

## 2022-01-26 NOTE — PROGRESS NOTES
1. Have you been to the ER, urgent care clinic since your last visit? Hospitalized since your last visit? No    2. Have you seen or consulted any other health care providers outside of the [address] [place] [address] Drive since your last visit? Include any pap smears or colon screening.  No

## 2022-01-26 NOTE — PROGRESS NOTES
[employer] [facility] at [facility] Surgery History and Physical    History of Present Illness:      Mai Griffith is a 79 y.o. male who has an infected sebaceous cyst of the upper back. He has had a cyst of the back present for a number of years but more recently in the past few weeks it has become infected. He recently saw his primary care when it started to drain fluid. He was started on antibiotics. He is having drainage from the abscess constantly. The redness from the cyst has improved considerably since he has been on antibiotics. He is having mild pain at the site. Past Medical History:   Diagnosis Date    Calculus of kidney     Cerebral artery occlusion with cerebral infarction ([facility] [address] 10/2016    Elevated cholesterol 5/26/2010    Fracture of right ankle 5/26/2010    HBP (high blood pressure) 5/26/2010    NIDDM (non-insulin dependent diabetes mellitus) 05/26/2010    Occlusion of left vertebral artery 2016    Primary urethral papillary carcinoma ([facility] [address] 2014    Dr Shy Renae Skin cancer of face     Stenosis of right vertebral artery     Ureteral calculus 5/26/2010       Past Surgical History:   Procedure Laterality Date    ENDOSCOPY, COLON, DIAGNOSTIC  02; 2/07; 2012    polyp 5 yr    HX TONSILLECTOMY      HX UROLOGICAL  2014    bladder    HX UROLOGICAL  2015    bladder    HX UROLOGICAL  04/07/2020    transurethral resection of recurrent bladder tumor Dr. Radha Ulrich         Current Outpatient Medications:     doxycycline (ADOXA) 100 mg tablet, Take 1 Tablet by mouth two (2) times a day for 10 days. , Disp: 20 Tablet, Rfl: 0    clopidogreL (PLAVIX) 75 mg tab, TAKE 1 TABLET BY MOUTH EVERY DAY, Disp: 90 Tablet, Rfl: 1    metFORMIN ER (GLUCOPHAGE XR) 500 mg tablet, Take 1 Tablet by mouth two (2) times daily (with meals). , Disp: 180 Tablet, Rfl: 1    lansoprazole (PREVACID) 30 mg capsule, TAKE 1 CAP BY MOUTH DAILY (BEFORE BREAKFAST). , Disp: 90 Capsule, Rfl: 1    pravastatin (PRAVACHOL) 10 mg tablet, Take 1 Tablet by mouth nightly., Disp: 90 Tablet, Rfl: 1    glimepiride (AMARYL) 4 mg tablet, TAKE 1 TABLET BY MOUTH EVERY DAY IN THE MORNING, Disp: 90 Tablet, Rfl: 1    amLODIPine-valsartan (EXFORGE)  mg per tablet, TAKE 1 TABLET BY MOUTH EVERY DAY, Disp: 90 Tablet, Rfl: 0    finasteride (PROSCAR) 5 mg tablet, TAKE 1 TABLET BY MOUTH EVERY DAY, Disp: , Rfl:     Bifidobacterium Infantis (ALIGN) 4 mg cap, Take  by mouth., Disp: , Rfl:     cholecalciferol (VITAMIN D3) 1,000 unit tablet, Take  by mouth daily. , Disp: , Rfl:     aspirin delayed-release 81 mg tablet, Take 1 Tab by mouth daily. , Disp: 30 Tab, Rfl: 0    clotrimazole-betamethasone (LOTRISONE) topical cream, APPLY TO AFFECTED AREA TWICE A DAY (Patient not taking: Reported on 1/26/2022), Disp: 60 g, Rfl: 0    ondansetron (ZOFRAN ODT) 4 mg disintegrating tablet, TAKE 1 TABLET BY MOUTH EVERY 8 HOURS AS NEEDED FOR NAUSEA (Patient not taking: Reported on 1/26/2022), Disp: 30 Tab, Rfl: 0    diclofenac (VOLTAREN) 1 % gel, Apply 4 g to affected area four (4) times daily.  (Patient not taking: Reported on 1/26/2022), Disp: 100 g, Rfl: 0    ACCU-CHEK GIANNI PLUS TEST STRP strip, TEST DAILY AS DIRECTED *E11.9* (Patient not taking: Reported on 1/26/2022), Disp: 100 Strip, Rfl: 0    Allergies   Allergen Reactions    Benicar [Olmesartan] Unable to Obtain    Moexipril Other (comments)    Pcn [Penicillins] Unable to Obtain    Simvastatin Myalgia    Simvastatin Myalgia    Uniretic [Moexipril-Hydrochlorothiazide] Other (comments)     GI upset       Social History     Socioeconomic History    Marital status:      Spouse name: Not on file    Number of children: Not on file    Years of education: Not on file    Highest education level: Not on file   Occupational History    Not on file   Tobacco Use    Smoking status: Former Smoker     Packs/day: 1.00     Years: 30.00     Pack years: 30.00     Quit date: 5/27/1992     Years since quittin.6    Smokeless tobacco: Never Used   Vaping Use    Vaping Use: Never used   Substance and Sexual Activity    Alcohol use: No    Drug use: No    Sexual activity: Yes     Partners: Female   Other Topics Concern    Not on file   Social History Narrative    Not on file     Social Determinants of Health     Financial Resource Strain:     Difficulty of Paying Living Expenses: Not on file   Food Insecurity:     Worried About Running Out of Food in the Last Year: Not on file    Raffi of Food in the Last Year: Not on file   Transportation Needs:     Lack of Transportation (Medical): Not on file    Lack of Transportation (Non-Medical): Not on file   Physical Activity:     Days of Exercise per Week: Not on file    Minutes of Exercise per Session: Not on file   Stress:     Feeling of Stress : Not on file   Social Connections:     Frequency of Communication with Friends and Family: Not on file    Frequency of Social Gatherings with Friends and Family: Not on file    Attends Congregational Services: Not on file    Active Member of [address] [place] [address] or Organizations: Not on file    Attends Club or Organization Meetings: Not on file    Marital Status: Not on file   Intimate Partner Violence:     Fear of Current or Ex-Partner: Not on file    Emotionally Abused: Not on file    Physically Abused: Not on file    Sexually Abused: Not on file   Housing Stability:     Unable to Pay for Housing in the Last Year: Not on file    Number of [place] in the Last Year: Not on file    Unstable Housing in the Last Year: Not on file       No family history on file.     ROS   Constitutional: negative  Ears, Nose, Mouth, Throat, and Face: negative  Respiratory: negative  Cardiovascular: negative  Gastrointestinal: negative  Genitourinary:negative  Integument/Breast: Large infected sebaceous cyst of the upper back  Hematologic/Lymphatic: negative  Behavioral/Psychiatric: negative  Allergic/Immunologic: negative      Physical Exam:     Visit Vitals  /69 (BP 1 Location: Right arm, BP Patient Position: Sitting, BP Cuff Size: Adult)   Pulse 75   Temp 97.7 °F (36.5 °C) (Oral)   Resp 18   Ht 5' 9\" (1.753 m)   Wt 205 lb (93 kg)   SpO2 95%   BMI 30.27 kg/m²       General - alert and oriented, no apparent distress  HEENT - no jaundice, no hearing imparement  Pulm - CTAB, no C/W/R  CV - RRR, no M/R/G  Abd -soft, nondistended   Ext - pulses intact in UE and LE bilaterally, no edema  Skin - supple, no rashes, upper back with infected sebaceous cyst about 5 to 6 cm in size with surrounding erythema that is mild and some induration, purulent sebaceous material from the wound, I was able to remove most of the sebaceous material from the wound  Psychiatric - normal affect, good mood    Labs  Lab Results   Component Value Date/Time    Sodium 140 12/09/2021 08:48 AM    Potassium 4.4 12/09/2021 08:48 AM    Chloride 112 (H) 12/09/2021 08:48 AM    CO2 25 12/09/2021 08:48 AM    Anion gap 3 (L) 12/09/2021 08:48 AM    Glucose 119 (H) 12/09/2021 08:48 AM    BUN 19 12/09/2021 08:48 AM    Creatinine 0.97 12/09/2021 08:48 AM    BUN/Creatinine ratio 20 12/09/2021 08:48 AM    GFR est AA >60 12/09/2021 08:48 AM    GFR est non-AA >60 12/09/2021 08:48 AM    Calcium 9.1 12/09/2021 08:48 AM    Bilirubin, total 0.6 12/09/2021 08:48 AM    Alk.  phosphatase 75 12/09/2021 08:48 AM    Protein, total 7.0 12/09/2021 08:48 AM    Albumin 3.9 12/09/2021 08:48 AM    Globulin 3.1 12/09/2021 08:48 AM    A-G Ratio 1.3 12/09/2021 08:48 AM    ALT (SGPT) 32 12/09/2021 08:48 AM    AST (SGOT) 17 12/09/2021 08:48 AM     Lab Results   Component Value Date/Time    WBC 8.5 11/01/2016 03:47 AM    WBC 9.1 07/02/2012 08:54 AM    HGB 12.6 11/01/2016 03:47 AM    HCT 38.5 11/01/2016 03:47 AM    PLATELET 339 56/73/2402 03:47 AM    MCV 90.4 11/01/2016 03:47 AM         Imaging  none  I have reviewed and agree with all of the pertinent images    Assessment:     Ronan Fontana Julee Sullivan is a 79 y.o. male with infected sebaceous cyst of the upper back    Recommendations:     1. The patient does have a medium to large size sebaceous cyst of the upper back about 5 to 6 cm in size. The erythema is much improved from the pictures the family had show me. I removed the remaining bit of sebaceous material from the wound. He will continue covering the wound at home and finishes doxycycline for antibiotic coverage. He will see me back in 2 weeks and will see how it is looking. We will do surgery probably in about 8 weeks or so after this is healing up. Gabriel Stark MD    Greater than half of the time: 30 minutes was used in counciling the patient about diagnosis and treatment plan    Mr. Julee Sullivan has a reminder for a \"due or due soon\" health maintenance. I have asked that he contact his primary care provider for follow-up on this health maintenance.

## 2022-02-09 ENCOUNTER — OFFICE VISIT (OUTPATIENT)
Dept: SURGERY | Age: 82
End: 2022-02-09
Payer: MEDICARE

## 2022-02-09 VITALS
HEIGHT: 69 IN | DIASTOLIC BLOOD PRESSURE: 67 MMHG | OXYGEN SATURATION: 94 % | HEART RATE: 81 BPM | RESPIRATION RATE: 18 BRPM | SYSTOLIC BLOOD PRESSURE: 123 MMHG | WEIGHT: 205 LBS | BODY MASS INDEX: 30.36 KG/M2 | TEMPERATURE: 97.5 F

## 2022-02-09 DIAGNOSIS — L72.3 INFECTED SEBACEOUS CYST: Primary | ICD-10-CM

## 2022-02-09 DIAGNOSIS — L08.9 INFECTED SEBACEOUS CYST: Primary | ICD-10-CM

## 2022-02-09 PROCEDURE — G8754 DIAS BP LESS 90: HCPCS | Performed by: SURGERY

## 2022-02-09 PROCEDURE — G8427 DOCREV CUR MEDS BY ELIG CLIN: HCPCS | Performed by: SURGERY

## 2022-02-09 PROCEDURE — G8752 SYS BP LESS 140: HCPCS | Performed by: SURGERY

## 2022-02-09 PROCEDURE — G8536 NO DOC ELDER MAL SCRN: HCPCS | Performed by: SURGERY

## 2022-02-09 PROCEDURE — G8417 CALC BMI ABV UP PARAM F/U: HCPCS | Performed by: SURGERY

## 2022-02-09 PROCEDURE — 99214 OFFICE O/P EST MOD 30 MIN: CPT | Performed by: SURGERY

## 2022-02-09 PROCEDURE — 1101F PT FALLS ASSESS-DOCD LE1/YR: CPT | Performed by: SURGERY

## 2022-02-09 PROCEDURE — G8510 SCR DEP NEG, NO PLAN REQD: HCPCS | Performed by: SURGERY

## 2022-02-09 NOTE — PROGRESS NOTES
1. Have you been to the ER, urgent care clinic since your last visit? Hospitalized since your last visit? No    2. Have you seen or consulted any other health care providers outside of the [address] [place] [address] since your last visit? Include any pap smears or colon screening.  No

## 2022-02-09 NOTE — LETTER
2/9/2022    Patient: Mai Griffith   YOB: 1940   Date of Visit: 2/9/2022     Janell Ferrell NP  [address] [address]  Via In Basket    Dear Janell Ferrell NP,      Thank you for referring Mr. Awilda Ruiz to [facility] for evaluation. My notes for this consultation are attached. If you have questions, please do not hesitate to call me. I look forward to following your patient along with you.       Sincerely,    Magalys Huggnis MD

## 2022-02-09 NOTE — PROGRESS NOTES
[address] [facility] History and Physical    History of Present Illness:      Javier Collazo is a 79 y.o. male who was recently seen for an infected sebaceous cyst of the upper left back. The infection has cleared and feeling much better. He is having minimal thin drainage from the opening of the sebaceous cyst..  Does not have any more redness or pain. Past Medical History:   Diagnosis Date    Calculus of kidney     Cerebral artery occlusion with cerebral infarction ([facility] 10/2016    Elevated cholesterol 5/26/2010    Fracture of right ankle 5/26/2010    HBP (high blood pressure) 5/26/2010    NIDDM (non-insulin dependent diabetes mellitus) 05/26/2010    Occlusion of left vertebral artery 2016    Primary urethral papillary carcinoma ([facility] [address] 2014    Dr Ender Steinberg Skin cancer of face     Stenosis of right vertebral artery     Ureteral calculus 5/26/2010       Past Surgical History:   Procedure Laterality Date    ENDOSCOPY, COLON, DIAGNOSTIC  02; 2/07; 2012    polyp 5 yr    HX TONSILLECTOMY      HX UROLOGICAL  2014    bladder    HX UROLOGICAL  2015    bladder    HX UROLOGICAL  04/07/2020    transurethral resection of recurrent bladder tumor Dr. lT Krueger         Current Outpatient Medications:     clopidogreL (PLAVIX) 75 mg tab, TAKE 1 TABLET BY MOUTH EVERY DAY, Disp: 90 Tablet, Rfl: 1    metFORMIN ER (GLUCOPHAGE XR) 500 mg tablet, Take 1 Tablet by mouth two (2) times daily (with meals). , Disp: 180 Tablet, Rfl: 1    clotrimazole-betamethasone (LOTRISONE) topical cream, APPLY TO AFFECTED AREA TWICE A DAY, Disp: 60 g, Rfl: 0    lansoprazole (PREVACID) 30 mg capsule, TAKE 1 CAP BY MOUTH DAILY (BEFORE BREAKFAST). , Disp: 90 Capsule, Rfl: 1    pravastatin (PRAVACHOL) 10 mg tablet, Take 1 Tablet by mouth nightly., Disp: 90 Tablet, Rfl: 1    glimepiride (AMARYL) 4 mg tablet, TAKE 1 TABLET BY MOUTH EVERY DAY IN THE MORNING, Disp: 90 Tablet, Rfl: 1   amLODIPine-valsartan (EXFORGE)  mg per tablet, TAKE 1 TABLET BY MOUTH EVERY DAY, Disp: 90 Tablet, Rfl: 0    finasteride (PROSCAR) 5 mg tablet, TAKE 1 TABLET BY MOUTH EVERY DAY, Disp: , Rfl:     diclofenac (VOLTAREN) 1 % gel, Apply 4 g to affected area four (4) times daily. , Disp: 100 g, Rfl: 0    ACCU-CHEK GIANNI PLUS TEST STRP strip, TEST DAILY AS DIRECTED *E11.9*, Disp: 100 Strip, Rfl: 0    Bifidobacterium Infantis (ALIGN) 4 mg cap, Take  by mouth., Disp: , Rfl:     cholecalciferol (VITAMIN D3) 1,000 unit tablet, Take  by mouth daily. , Disp: , Rfl:     aspirin delayed-release 81 mg tablet, Take 1 Tab by mouth daily. , Disp: 30 Tab, Rfl: 0    ondansetron (ZOFRAN ODT) 4 mg disintegrating tablet, TAKE 1 TABLET BY MOUTH EVERY 8 HOURS AS NEEDED FOR NAUSEA (Patient not taking: Reported on 2022), Disp: 30 Tab, Rfl: 0    Allergies   Allergen Reactions    Benicar [Olmesartan] Unable to Obtain    Moexipril Other (comments)    Pcn [Penicillins] Unable to Obtain    Simvastatin Myalgia    Simvastatin Myalgia    Uniretic [Moexipril-Hydrochlorothiazide] Other (comments)     GI upset       Social History     Socioeconomic History    Marital status:      Spouse name: Not on file    Number of children: Not on file    Years of education: Not on file    Highest education level: Not on file   Occupational History    Not on file   Tobacco Use    Smoking status: Former Smoker     Packs/day: 1.00     Years: 30.00     Pack years: 30.00     Quit date: 1992     Years since quittin.7    Smokeless tobacco: Never Used   Vaping Use    Vaping Use: Never used   Substance and Sexual Activity    Alcohol use: No    Drug use: No    Sexual activity: Yes     Partners: Female   Other Topics Concern    Not on file   Social History Narrative    Not on file     Social Determinants of Health     Financial Resource Strain:     Difficulty of Paying Living Expenses: Not on file   Food Insecurity:     Worried About [facility] in the Last Year: Not on file    Raffi of Food in the Last Year: Not on file   Transportation Needs:     Lack of Transportation (Medical): Not on file    Lack of Transportation (Non-Medical): Not on file   Physical Activity:     Days of Exercise per Week: Not on file    Minutes of Exercise per Session: Not on file   Stress:     Feeling of Stress : Not on file   Social Connections:     Frequency of Communication with Friends and Family: Not on file    Frequency of Social Gatherings with Friends and Family: Not on file    Attends Anabaptist Services: Not on file    Active Member of [address] [place] [address] or Organizations: Not on file    Attends Club or Organization Meetings: Not on file    Marital Status: Not on file   Intimate Partner Violence:     Fear of Current or Ex-Partner: Not on file    Emotionally Abused: Not on file    Physically Abused: Not on file    Sexually Abused: Not on file   Housing Stability:     Unable to Pay for Housing in the Last Year: Not on file    Number of [place] in the Last Year: Not on file    Unstable Housing in the Last Year: Not on file       No family history on file.     ROS   Constitutional: negative  Ears, Nose, Mouth, Throat, and Face: negative  Respiratory: negative  Cardiovascular: negative  Gastrointestinal: negative  Genitourinary:negative  Integument/Breast: negative  Hematologic/Lymphatic: negative  Behavioral/Psychiatric: negative  Allergic/Immunologic: negative      Physical Exam:     Visit Vitals  /67 (BP 1 Location: Left arm, BP Patient Position: Sitting, BP Cuff Size: Adult)   Pulse 81   Temp 97.5 °F (36.4 °C) (Oral)   Resp 18   Ht 5' 9\" (1.753 m)   Wt 205 lb (93 kg)   SpO2 94%   BMI 30.27 kg/m²       General - alert and oriented, no apparent distress  HEENT - no jaundice, no hearing imparement  Pulm - CTAB, no C/W/R  CV - RRR, no M/R/G  Abd -soft, nondistended  Ext - pulses intact in UE and LE bilaterally, no edema  Skin - supple, no rashes, left upper back with 1 cm opening into the cyst with no sebaceous material, no surrounding erythema, no induration, nontender to palpation, much smaller than before  Psychiatric - normal affect, good mood    Labs  Lab Results   Component Value Date/Time    Sodium 140 12/09/2021 08:48 AM    Potassium 4.4 12/09/2021 08:48 AM    Chloride 112 (H) 12/09/2021 08:48 AM    CO2 25 12/09/2021 08:48 AM    Anion gap 3 (L) 12/09/2021 08:48 AM    Glucose 119 (H) 12/09/2021 08:48 AM    BUN 19 12/09/2021 08:48 AM    Creatinine 0.97 12/09/2021 08:48 AM    BUN/Creatinine ratio 20 12/09/2021 08:48 AM    GFR est AA >60 12/09/2021 08:48 AM    GFR est non-AA >60 12/09/2021 08:48 AM    Calcium 9.1 12/09/2021 08:48 AM    Bilirubin, total 0.6 12/09/2021 08:48 AM    Alk. phosphatase 75 12/09/2021 08:48 AM    Protein, total 7.0 12/09/2021 08:48 AM    Albumin 3.9 12/09/2021 08:48 AM    Globulin 3.1 12/09/2021 08:48 AM    A-G Ratio 1.3 12/09/2021 08:48 AM    ALT (SGPT) 32 12/09/2021 08:48 AM    AST (SGOT) 17 12/09/2021 08:48 AM     Lab Results   Component Value Date/Time    WBC 8.5 11/01/2016 03:47 AM    WBC 9.1 07/02/2012 08:54 AM    HGB 12.6 11/01/2016 03:47 AM    HCT 38.5 11/01/2016 03:47 AM    PLATELET 307 89/73/4102 03:47 AM    MCV 90.4 11/01/2016 03:47 AM         Imaging  none  I have reviewed and agree with all of the pertinent images    Assessment:     Gabriela Arroyo is a 79 y.o. male with infected sebaceous cyst    Recommendations:     1. Infected sebaceous cyst looks much better. It is not having any more drainage in the skin around it looks healthy. It is getting a bit smaller as well. At this point he and the family do not want to explore surgical excision at this point. He will continue wound care for now and see if it will not be a problem in the future. If he develops any more redness or wound issues I will have him come back and see me.     Duane Sin, MD    Greater than half of the time: 30 minutes was used in counciling the patient about diagnosis and treatment plan    Mr. Joseluis Crawford has a reminder for a \"due or due soon\" health maintenance. I have asked that he contact his primary care provider for follow-up on this health maintenance.

## 2022-03-20 PROBLEM — K21.9 GASTROESOPHAGEAL REFLUX DISEASE WITHOUT ESOPHAGITIS: Status: ACTIVE | Noted: 2017-04-23

## 2022-03-29 ENCOUNTER — OFFICE VISIT (OUTPATIENT)
Dept: NEUROLOGY | Age: 82
End: 2022-03-29
Payer: MEDICARE

## 2022-03-29 VITALS
SYSTOLIC BLOOD PRESSURE: 167 MMHG | RESPIRATION RATE: 18 BRPM | DIASTOLIC BLOOD PRESSURE: 73 MMHG | OXYGEN SATURATION: 98 % | HEART RATE: 73 BPM

## 2022-03-29 DIAGNOSIS — Z86.73 HISTORY OF ARTERIAL ISCHEMIC STROKE: ICD-10-CM

## 2022-03-29 DIAGNOSIS — Z91.89 STROKE RISK: ICD-10-CM

## 2022-03-29 DIAGNOSIS — I67.2 INTRACRANIAL ATHEROSCLEROSIS: Primary | ICD-10-CM

## 2022-03-29 PROCEDURE — G8417 CALC BMI ABV UP PARAM F/U: HCPCS | Performed by: PSYCHIATRY & NEUROLOGY

## 2022-03-29 PROCEDURE — G8427 DOCREV CUR MEDS BY ELIG CLIN: HCPCS | Performed by: PSYCHIATRY & NEUROLOGY

## 2022-03-29 PROCEDURE — G8754 DIAS BP LESS 90: HCPCS | Performed by: PSYCHIATRY & NEUROLOGY

## 2022-03-29 PROCEDURE — G8536 NO DOC ELDER MAL SCRN: HCPCS | Performed by: PSYCHIATRY & NEUROLOGY

## 2022-03-29 PROCEDURE — 1101F PT FALLS ASSESS-DOCD LE1/YR: CPT | Performed by: PSYCHIATRY & NEUROLOGY

## 2022-03-29 PROCEDURE — G8753 SYS BP > OR = 140: HCPCS | Performed by: PSYCHIATRY & NEUROLOGY

## 2022-03-29 PROCEDURE — G8432 DEP SCR NOT DOC, RNG: HCPCS | Performed by: PSYCHIATRY & NEUROLOGY

## 2022-03-29 PROCEDURE — 99203 OFFICE O/P NEW LOW 30 MIN: CPT | Performed by: PSYCHIATRY & NEUROLOGY

## 2022-03-29 NOTE — LETTER
NOTIFICATION     3/29/2022 8:12 AM    Mr. Carol Zarate  [address] [place] [address] [address] [id number]      To Whom It May Concern:    Carol Zarate is currently under the care of [address] [facility]. I have followed him long-term for history of stroke in the setting of intracranial atherosclerosis. He requires dual antiplatelet therapy consisting of Plavix and low-dose aspirin daily for stroke prevention. I understand he is in need of oral surgery. Typically, surgeons will hold Plavix for 5 days prior to the procedure and then resume once the surgeon clears the patient of any bleeding concerns postop. I would highly recommend that low-dose aspirin continue without being held given his stroke risk. If aspirin absolutely needs to be held according to your procedure, please do so for about 3 days and then have him resume his antiplatelets as soon as you clear him post procedure. I have no neurologic restrictions on him proceeding with the procedure in general.    He does understand he is a stroke risk and once his procedure is completed and provided that there are no bleeding concerns post procedure from your perspective, he should resume Plavix and aspirin immediately. If there are questions or concerns please have the patient contact our office.         Sincerely,      Nehemias Agrawal, DO  Neurologist  Diplomate, American Board of Psychiatry and Neurology  Board Certified, Adult Neurology and Brain Injury Medicine

## 2022-03-29 NOTE — PROGRESS NOTES
Chief Complaint   Patient presents with    Other     surgical clearance       HPI    Mr. Jerrod Hayward is an 69-year-old gentleman I saw 2018 the last time. He has a history of stroke and intracranial atherosclerosis consisting of a left vertebral artery occlusion and right vertebral artery stenosis. He also has some degree of bilateral carotid stenosis. He has a history of anterior posterior circulation strokes. He is on dual antiplatelets. Currently he is 1 year cancer free of bladder cancer. No chemo at this time. He is here for clearance to have oral surgery. Review of Systems   HENT:        Jaw pain   Eyes: Negative for double vision. Gastrointestinal: Negative for nausea. All other systems reviewed and are negative. Past Medical History:   Diagnosis Date    Calculus of kidney     Cerebral artery occlusion with cerebral infarction ([facility] [address] 10/2016    Elevated cholesterol 2010    Fracture of right ankle 2010    HBP (high blood pressure) 2010    NIDDM (non-insulin dependent diabetes mellitus) 2010    Occlusion of left vertebral artery 2016    Primary urethral papillary carcinoma [facility])     Dr Albin Hinojosa Skin cancer of face     Stenosis of right vertebral artery     Ureteral calculus 2010     No family history on file.   Social History     Socioeconomic History    Marital status:      Spouse name: Not on file    Number of children: Not on file    Years of education: Not on file    Highest education level: Not on file   Occupational History    Not on file   Tobacco Use    Smoking status: Former Smoker     Packs/day: 1.00     Years: 30.00     Pack years: 30.00     Quit date: 1992     Years since quittin.8    Smokeless tobacco: Never Used   Vaping Use    Vaping Use: Never used   Substance and Sexual Activity    Alcohol use: No    Drug use: No    Sexual activity: Yes     Partners: Female   Other Topics Concern    Not on file Social History Narrative    Not on file     Social Determinants of Health     Financial Resource Strain:     Difficulty of Paying Living Expenses: Not on file   Food Insecurity:     Worried About Running Out of Food in the Last Year: Not on file    Raffi of Food in the Last Year: Not on file   Transportation Needs:     Lack of Transportation (Medical): Not on file    Lack of Transportation (Non-Medical): Not on file   Physical Activity:     Days of Exercise per Week: Not on file    Minutes of Exercise per Session: Not on file   Stress:     Feeling of Stress : Not on file   Social Connections:     Frequency of Communication with Friends and Family: Not on file    Frequency of Social Gatherings with Friends and Family: Not on file    Attends Cheondoism Services: Not on file    Active Member of [address] [place] [address] [employer] or Organizations: Not on file    Attends Club or Organization Meetings: Not on file    Marital Status: Not on file   Intimate Partner Violence:     Fear of Current or Ex-Partner: Not on file    Emotionally Abused: Not on file    Physically Abused: Not on file    Sexually Abused: Not on file   Housing Stability:     Unable to Pay for Housing in the Last Year: Not on file    Number of [place] in the Last Year: Not on file    Unstable Housing in the Last Year: Not on file     Allergies   Allergen Reactions    Benicar [Olmesartan] Unable to Obtain    Moexipril Other (comments)    Pcn [Penicillins] Unable to Obtain    Simvastatin Myalgia    Simvastatin Myalgia    Uniretic [Moexipril-Hydrochlorothiazide] Other (comments)     GI upset         Current Outpatient Medications   Medication Sig    amLODIPine-valsartan (EXFORGE)  mg per tablet TAKE 1 TABLET BY MOUTH EVERY DAY    clopidogreL (PLAVIX) 75 mg tab TAKE 1 TABLET BY MOUTH EVERY DAY    metFORMIN ER (GLUCOPHAGE XR) 500 mg tablet Take 1 Tablet by mouth two (2) times daily (with meals).     clotrimazole-betamethasone (LOTRISONE) topical cream APPLY TO AFFECTED AREA TWICE A DAY    lansoprazole (PREVACID) 30 mg capsule TAKE 1 CAP BY MOUTH DAILY (BEFORE BREAKFAST).  pravastatin (PRAVACHOL) 10 mg tablet Take 1 Tablet by mouth nightly.  glimepiride (AMARYL) 4 mg tablet TAKE 1 TABLET BY MOUTH EVERY DAY IN THE MORNING    ondansetron (ZOFRAN ODT) 4 mg disintegrating tablet TAKE 1 TABLET BY MOUTH EVERY 8 HOURS AS NEEDED FOR NAUSEA    finasteride (PROSCAR) 5 mg tablet TAKE 1 TABLET BY MOUTH EVERY DAY    diclofenac (VOLTAREN) 1 % gel Apply 4 g to affected area four (4) times daily.  ACCU-CHEK GIANNI PLUS TEST STRP strip TEST DAILY AS DIRECTED *E11.9*    Bifidobacterium Infantis (ALIGN) 4 mg cap Take  by mouth.  cholecalciferol (VITAMIN D3) 1,000 unit tablet Take  by mouth daily.  aspirin delayed-release 81 mg tablet Take 1 Tab by mouth daily. No current facility-administered medications for this visit. Neurologic Exam     Mental Status   WD/WN adult in NAD, normal grooming  VSS  A&O x 3    PERRL, nonicteric, no ptosis  Face is symmetric, tongue midline  Speech is clear  No limb ataxia. No abnl movements. Moving all extemities spontaneously and symmetric  Normal gait    CVS RRR  Lungs nonlabored  Skin is warm and dry         Visit Vitals  BP (!) 167/73   Pulse 73   Resp 18   SpO2 98%       Assessment and Plan   Diagnoses and all orders for this visit:    1. Intracranial atherosclerosis    2. History of arterial ischemic stroke    3. Stroke risk      79year-old gentleman who has a history of anterior posterior circulation strokes in the setting of intracranial atherosclerosis. He is a stroke risk permanently. He requires aspirin and Plavix. I understand he requires oral surgery and I am not restricting him from proceeding. I am preparing a letter today to give to his oral surgeon by him indicating that I have no neurologic restrictions on him having the surgery.   If Plavix must be stopped it should be stopped about 5 days prior to the procedure. I would like commend that aspirin continue if possible. Once he is done with his procedure and when the surgeon clears him post procedure he should resume his antiplatelets immediately. I discussed with . Ina Stevenson he had a stroke risk. We did stroke education. Follow-up with primary care. 30 minutes of time was taken in total today reviewing the medical record to include the previous neuroimaging, discussion, preparation of letter today, and time completing medical documentation.     Nadiya Shane, [address]  Diplomate ABPN

## 2022-06-27 ENCOUNTER — OFFICE VISIT (OUTPATIENT)
Dept: FAMILY MEDICINE CLINIC | Age: 82
End: 2022-06-27
Payer: MEDICARE

## 2022-06-27 VITALS
HEART RATE: 66 BPM | RESPIRATION RATE: 16 BRPM | SYSTOLIC BLOOD PRESSURE: 123 MMHG | HEIGHT: 69 IN | OXYGEN SATURATION: 98 % | TEMPERATURE: 97.6 F | BODY MASS INDEX: 29.89 KG/M2 | WEIGHT: 201.8 LBS | DIASTOLIC BLOOD PRESSURE: 67 MMHG

## 2022-06-27 DIAGNOSIS — E11.9 TYPE 2 DIABETES MELLITUS WITHOUT COMPLICATION, WITHOUT LONG-TERM CURRENT USE OF INSULIN (HCC): Primary | ICD-10-CM

## 2022-06-27 DIAGNOSIS — R25.2 MUSCLE CRAMPS: ICD-10-CM

## 2022-06-27 DIAGNOSIS — E78.00 HYPERCHOLESTEROLEMIA: ICD-10-CM

## 2022-06-27 DIAGNOSIS — I65.09 VERTEBRAL ARTERY OCCLUSION, UNSPECIFIED LATERALITY: ICD-10-CM

## 2022-06-27 DIAGNOSIS — C68.0 PRIMARY URETHRAL PAPILLARY CARCINOMA (HCC): ICD-10-CM

## 2022-06-27 DIAGNOSIS — I10 BENIGN ESSENTIAL HTN: ICD-10-CM

## 2022-06-27 DIAGNOSIS — K21.9 GASTROESOPHAGEAL REFLUX DISEASE WITHOUT ESOPHAGITIS: ICD-10-CM

## 2022-06-27 LAB
ALBUMIN SERPL-MCNC: 4 G/DL (ref 3.5–5)
ALBUMIN/GLOB SERPL: 1.4 {RATIO} (ref 1.1–2.2)
ALP SERPL-CCNC: 80 U/L (ref 45–117)
ALT SERPL-CCNC: 29 U/L (ref 12–78)
ANION GAP SERPL CALC-SCNC: 4 MMOL/L (ref 5–15)
AST SERPL-CCNC: 16 U/L (ref 15–37)
BILIRUB SERPL-MCNC: 0.6 MG/DL (ref 0.2–1)
BUN SERPL-MCNC: 21 MG/DL (ref 6–20)
BUN/CREAT SERPL: 19 (ref 12–20)
CALCIUM SERPL-MCNC: 9 MG/DL (ref 8.5–10.1)
CHLORIDE SERPL-SCNC: 107 MMOL/L (ref 97–108)
CHOLEST SERPL-MCNC: 105 MG/DL
CO2 SERPL-SCNC: 28 MMOL/L (ref 21–32)
CREAT SERPL-MCNC: 1.1 MG/DL (ref 0.7–1.3)
CREAT UR-MCNC: 26.6 MG/DL
EST. AVERAGE GLUCOSE BLD GHB EST-MCNC: 154 MG/DL
GLOBULIN SER CALC-MCNC: 2.8 G/DL (ref 2–4)
GLUCOSE SERPL-MCNC: 132 MG/DL (ref 65–100)
HBA1C MFR BLD: 7 % (ref 4–5.6)
HDLC SERPL-MCNC: 51 MG/DL
HDLC SERPL: 2.1 {RATIO} (ref 0–5)
LDLC SERPL CALC-MCNC: 40.8 MG/DL (ref 0–100)
MAGNESIUM SERPL-MCNC: 2.3 MG/DL (ref 1.6–2.4)
MICROALBUMIN UR-MCNC: 0.75 MG/DL
MICROALBUMIN/CREAT UR-RTO: 28 MG/G (ref 0–30)
POTASSIUM SERPL-SCNC: 4.6 MMOL/L (ref 3.5–5.1)
PROT SERPL-MCNC: 6.8 G/DL (ref 6.4–8.2)
SODIUM SERPL-SCNC: 139 MMOL/L (ref 136–145)
TRIGL SERPL-MCNC: 66 MG/DL (ref ?–150)
VLDLC SERPL CALC-MCNC: 13.2 MG/DL

## 2022-06-27 PROCEDURE — G8754 DIAS BP LESS 90: HCPCS | Performed by: NURSE PRACTITIONER

## 2022-06-27 PROCEDURE — G0463 HOSPITAL OUTPT CLINIC VISIT: HCPCS | Performed by: NURSE PRACTITIONER

## 2022-06-27 PROCEDURE — G8752 SYS BP LESS 140: HCPCS | Performed by: NURSE PRACTITIONER

## 2022-06-27 PROCEDURE — 1123F ACP DISCUSS/DSCN MKR DOCD: CPT | Performed by: NURSE PRACTITIONER

## 2022-06-27 PROCEDURE — G8432 DEP SCR NOT DOC, RNG: HCPCS | Performed by: NURSE PRACTITIONER

## 2022-06-27 PROCEDURE — 1101F PT FALLS ASSESS-DOCD LE1/YR: CPT | Performed by: NURSE PRACTITIONER

## 2022-06-27 PROCEDURE — G8417 CALC BMI ABV UP PARAM F/U: HCPCS | Performed by: NURSE PRACTITIONER

## 2022-06-27 PROCEDURE — G8536 NO DOC ELDER MAL SCRN: HCPCS | Performed by: NURSE PRACTITIONER

## 2022-06-27 PROCEDURE — G8427 DOCREV CUR MEDS BY ELIG CLIN: HCPCS | Performed by: NURSE PRACTITIONER

## 2022-06-27 PROCEDURE — 99214 OFFICE O/P EST MOD 30 MIN: CPT | Performed by: NURSE PRACTITIONER

## 2022-06-27 NOTE — PROGRESS NOTES
HISTORY OF PRESENT ILLNESS  Ruben Webb is a 79 y.o. male. HPI  6 month follow up chronic health problems. Hypercholesterolemia.  Taking prescribed pravachol.  Following healthy diet, stays active. HTN.   Adhering to medication regimen.    T2DM.  Taking glimeperide and metformin as prescribed.  Has reduced sugar and processed carbs in diet. History of vertebral artery occlusion.  Taking plavix as prescribed.  Followed by neurology Dr. Alyssa Hull. Bladder cancer.  Followed by oncology and urologist Dr. Alta Mann. Recently had recurrence. Has procedure scheduled for September.    GERD.  Under care of Dr. Elsy James.  Continues to have intermittent nausea.  On probiotics and zofran. Taking lansoprazole as prescribed. C/o intermittent leg cramps in back of thighs. Patient Active Problem List   Diagnosis Code    Type 2 diabetes mellitus without complication, without long-term current use of insulin [facility] E11.9    Hypercholesterolemia E78.00    Benign essential HTN I10    Vertebral artery occlusion, unspecified laterality I65.09    Gastroesophageal reflux disease without esophagitis K21.9    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     Current Outpatient Medications   Medication Sig    glimepiride (AMARYL) 4 mg tablet TAKE 1 TABLET BY MOUTH EVERY DAY IN THE MORNING    lansoprazole (PREVACID) 30 mg capsule TAKE 1 CAP BY MOUTH DAILY (BEFORE BREAKFAST).  pravastatin (PRAVACHOL) 10 mg tablet Take 1 Tablet by mouth nightly. Appointment due.  amLODIPine-valsartan (EXFORGE)  mg per tablet TAKE 1 TABLET BY MOUTH EVERY DAY    clopidogreL (PLAVIX) 75 mg tab TAKE 1 TABLET BY MOUTH EVERY DAY    metFORMIN ER (GLUCOPHAGE XR) 500 mg tablet Take 1 Tablet by mouth two (2) times daily (with meals).     clotrimazole-betamethasone (LOTRISONE) topical cream APPLY TO AFFECTED AREA TWICE A DAY    ondansetron (ZOFRAN ODT) 4 mg disintegrating tablet TAKE 1 TABLET BY MOUTH EVERY 8 HOURS AS NEEDED FOR NAUSEA    finasteride (PROSCAR) 5 mg tablet TAKE 1 TABLET BY MOUTH EVERY DAY    diclofenac (VOLTAREN) 1 % gel Apply 4 g to affected area four (4) times daily.  ACCU-CHEK GIANNI PLUS TEST STRP strip TEST DAILY AS DIRECTED *E11.9*    Bifidobacterium Infantis (ALIGN) 4 mg cap Take  by mouth.  cholecalciferol (VITAMIN D3) 1,000 unit tablet Take  by mouth daily.  aspirin delayed-release 81 mg tablet Take 1 Tab by mouth daily. No current facility-administered medications for this visit. Social History     Tobacco Use    Smoking status: Former Smoker     Packs/day: 1.00     Years: 30.00     Pack years: 30.00     Quit date: 1992     Years since quittin.1    Smokeless tobacco: Never Used   Vaping Use    Vaping Use: Never used   Substance Use Topics    Alcohol use: No    Drug use: No     Blood pressure 123/67, pulse 66, temperature 97.6 °F (36.4 °C), temperature source Temporal, resp. rate 16, height 5' 9\" (1.753 m), weight 201 lb 12.8 oz (91.5 kg), SpO2 98 %. Review of Systems   Respiratory: Negative for cough and shortness of breath. Cardiovascular: Negative for chest pain, palpitations and leg swelling. Gastrointestinal: Positive for heartburn. Negative for abdominal pain and blood in stool. Neurological: Negative for dizziness and headaches. All other systems reviewed and are negative. Physical Exam  Constitutional:       General: He is not in acute distress. Comments: Overweight. Cardiovascular:      Rate and Rhythm: Normal rate and regular rhythm. Heart sounds: Normal heart sounds. Pulmonary:      Effort: Pulmonary effort is normal.      Breath sounds: Normal breath sounds. Musculoskeletal:      Cervical back: Neck supple. Right lower leg: No edema. Left lower leg: No edema. Lymphadenopathy:      Cervical: No cervical adenopathy. Skin:     General: Skin is warm and dry.    Neurological:      Coordination: Coordination normal.   Psychiatric: Mood and Affect: Mood normal.     Diabetic foot exam:     Left Foot:   Visual Exam: normal    Pulse DP: 2+ (normal)   Filament test: normal sensation    Vibratory sensation: normal      Right Foot:   Visual Exam: normal    Pulse DP: 2+ (normal)   Filament test: normal sensation    Vibratory sensation: normal      ASSESSMENT and PLAN  Diagnoses and all orders for this visit:    1. Type 2 diabetes mellitus without complication, without long-term current use of insulin (HCC)  -     HEMOGLOBIN A1C WITH EAG; Future  -     MICROALBUMIN, UR, RAND W/ MICROALB/CREAT RATIO; Future  -     HM DIABETES FOOT EXAM  Stable based on last A1C. Reviewed diabetic diet and carbohydrate restriction. 2. Primary urethral papillary carcinoma (HCC)  Recent recurrence. Follow up as scheduled with urologist.    3. Hypercholesterolemia  -     LIPID PANEL; Future  -     METABOLIC PANEL, COMPREHENSIVE; Future  Fasting labs sent. 4. Benign essential HTN  Controlled. Continue current treatment. 5. Vertebral artery occlusion, unspecified laterality  Condition stable. Followed by specialist.    6. Gastroesophageal reflux disease without esophagitis  Stable on PPI. GERD precautions reviewed. 7. Muscle cramps  -     MAGNESIUM; Future    Follow up 6 months. We discussed the expected course, resolution and complications of the diagnosis in detail. Medication risks, benefits, costs, interactions and side effects were discussed. The patient was advised to contact the office for worsening of condition or FTI as anticipated. The patient expressed understanding of the diagnosis and plan.

## 2022-06-27 NOTE — PROGRESS NOTES
Chief Complaint   Patient presents with    Diabetes    Cholesterol Problem    Hypertension     1. Have you been to the ER, urgent care clinic since your last visit? Hospitalized since your last visit? No    2. Have you seen or consulted any other health care providers outside of the [address] [place] [address] since your last visit? Include any pap smears or colon screening. Yes When: 06/06/22, urologist doctor, bladder surgery.

## 2022-07-09 DIAGNOSIS — E11.9 TYPE 2 DIABETES MELLITUS WITHOUT COMPLICATION, WITHOUT LONG-TERM CURRENT USE OF INSULIN (HCC): ICD-10-CM

## 2022-07-10 RX ORDER — METFORMIN HYDROCHLORIDE 500 MG/1
TABLET, EXTENDED RELEASE ORAL
Qty: 180 TABLET | Refills: 1 | Status: SHIPPED | OUTPATIENT
Start: 2022-07-10

## 2022-07-24 RX ORDER — CLOPIDOGREL BISULFATE 75 MG/1
TABLET ORAL
Qty: 90 TABLET | Refills: 1 | Status: SHIPPED | OUTPATIENT
Start: 2022-07-24

## 2022-09-08 DIAGNOSIS — E11.9 TYPE 2 DIABETES MELLITUS WITHOUT COMPLICATION, WITHOUT LONG-TERM CURRENT USE OF INSULIN (HCC): ICD-10-CM

## 2022-09-08 DIAGNOSIS — K21.9 GASTROESOPHAGEAL REFLUX DISEASE WITHOUT ESOPHAGITIS: ICD-10-CM

## 2022-09-08 RX ORDER — GLIMEPIRIDE 4 MG/1
TABLET ORAL
Qty: 90 TABLET | Refills: 0 | Status: SHIPPED | OUTPATIENT
Start: 2022-09-08

## 2022-09-08 RX ORDER — LANSOPRAZOLE 30 MG/1
CAPSULE, DELAYED RELEASE ORAL
Qty: 90 CAPSULE | Refills: 0 | Status: SHIPPED | OUTPATIENT
Start: 2022-09-08

## 2022-10-24 ENCOUNTER — TELEPHONE (OUTPATIENT)
Dept: FAMILY MEDICINE CLINIC | Age: 82
End: 2022-10-24

## 2022-10-24 NOTE — TELEPHONE ENCOUNTER
Outbound call to patient. Name and  verified. Scheduled appointment 11/3/22 @ 8:30 a.m for pre-op, patient agreed.

## 2022-11-03 ENCOUNTER — OFFICE VISIT (OUTPATIENT)
Dept: FAMILY MEDICINE CLINIC | Age: 82
End: 2022-11-03
Payer: MEDICARE

## 2022-11-03 VITALS
BODY MASS INDEX: 29.8 KG/M2 | HEIGHT: 69 IN | WEIGHT: 201.2 LBS | HEART RATE: 71 BPM | DIASTOLIC BLOOD PRESSURE: 60 MMHG | OXYGEN SATURATION: 98 % | SYSTOLIC BLOOD PRESSURE: 129 MMHG | RESPIRATION RATE: 16 BRPM | TEMPERATURE: 97.6 F

## 2022-11-03 DIAGNOSIS — I10 BENIGN ESSENTIAL HTN: ICD-10-CM

## 2022-11-03 DIAGNOSIS — C68.0 PRIMARY URETHRAL PAPILLARY CARCINOMA (HCC): ICD-10-CM

## 2022-11-03 DIAGNOSIS — E11.9 TYPE 2 DIABETES MELLITUS WITHOUT COMPLICATION, WITHOUT LONG-TERM CURRENT USE OF INSULIN (HCC): ICD-10-CM

## 2022-11-03 DIAGNOSIS — K21.9 GASTROESOPHAGEAL REFLUX DISEASE WITHOUT ESOPHAGITIS: ICD-10-CM

## 2022-11-03 DIAGNOSIS — I65.09 VERTEBRAL ARTERY OCCLUSION, UNSPECIFIED LATERALITY: ICD-10-CM

## 2022-11-03 DIAGNOSIS — H25.013 CORTICAL AGE-RELATED CATARACT OF BOTH EYES: ICD-10-CM

## 2022-11-03 DIAGNOSIS — Z01.818 PRE-OP EVALUATION: Primary | ICD-10-CM

## 2022-11-03 DIAGNOSIS — E78.00 HYPERCHOLESTEROLEMIA: ICD-10-CM

## 2022-11-03 PROCEDURE — 1101F PT FALLS ASSESS-DOCD LE1/YR: CPT | Performed by: NURSE PRACTITIONER

## 2022-11-03 PROCEDURE — G8754 DIAS BP LESS 90: HCPCS | Performed by: NURSE PRACTITIONER

## 2022-11-03 PROCEDURE — G8536 NO DOC ELDER MAL SCRN: HCPCS | Performed by: NURSE PRACTITIONER

## 2022-11-03 PROCEDURE — 3078F DIAST BP <80 MM HG: CPT | Performed by: NURSE PRACTITIONER

## 2022-11-03 PROCEDURE — 3074F SYST BP LT 130 MM HG: CPT | Performed by: NURSE PRACTITIONER

## 2022-11-03 PROCEDURE — G8752 SYS BP LESS 140: HCPCS | Performed by: NURSE PRACTITIONER

## 2022-11-03 PROCEDURE — 99214 OFFICE O/P EST MOD 30 MIN: CPT | Performed by: NURSE PRACTITIONER

## 2022-11-03 PROCEDURE — G8427 DOCREV CUR MEDS BY ELIG CLIN: HCPCS | Performed by: NURSE PRACTITIONER

## 2022-11-03 PROCEDURE — G8417 CALC BMI ABV UP PARAM F/U: HCPCS | Performed by: NURSE PRACTITIONER

## 2022-11-03 PROCEDURE — 3051F HG A1C>EQUAL 7.0%<8.0%: CPT | Performed by: NURSE PRACTITIONER

## 2022-11-03 PROCEDURE — 1123F ACP DISCUSS/DSCN MKR DOCD: CPT | Performed by: NURSE PRACTITIONER

## 2022-11-03 PROCEDURE — G8432 DEP SCR NOT DOC, RNG: HCPCS | Performed by: NURSE PRACTITIONER

## 2022-11-03 PROCEDURE — G0463 HOSPITAL OUTPT CLINIC VISIT: HCPCS | Performed by: NURSE PRACTITIONER

## 2022-11-03 NOTE — PROGRESS NOTES
Chief Complaint   Patient presents with    Pre-op Exam     Forms for bilateral cataract surgery 11/15/22. 1. \"Have you been to the ER, urgent care clinic since your last visit? Hospitalized since your last visit? \" No    2. \"Have you seen or consulted any other health care providers outside of the [address] [place] [address] since your last visit? \" No     3. For patients aged 39-70: Has the patient had a colonoscopy / FIT/ Cologuard? NA - based on age      If the patient is female:    4. For patients aged 41-77: Has the patient had a mammogram within the past 2 years? NA - based on age or sex      11. For patients aged 21-65: Has the patient had a pap smear?  NA - based on age or sex         3 most recent PHQ Screens 11/3/2022   Little interest or pleasure in doing things Not at all   Feeling down, depressed, irritable, or hopeless Not at all   Total Score PHQ 2 0   Trouble falling or staying asleep, or sleeping too much -   Feeling tired or having little energy -   Poor appetite, weight loss, or overeating -   Feeling bad about yourself - or that you are a failure or have let yourself or your family down -   Trouble concentrating on things such as school, work, reading, or watching TV -   Moving or speaking so slowly that other people could have noticed; or the opposite being so fidgety that others notice -   Thoughts of being better off dead, or hurting yourself in some way -   PHQ 9 Score -   How difficult have these problems made it for you to do your work, take care of your home and get along with others -       Health Maintenance Due   Topic Date Due    Eye Exam Retinal or Dilated  06/25/2016    COVID-19 Vaccine (4 - Booster for Nabil Powersite series) 12/21/2021    Medicare Yearly Exam  06/08/2022    Flu Vaccine (1) 08/01/2022

## 2022-11-03 NOTE — PROGRESS NOTES
HISTORY OF PRESENT ILLNESS  Liv Benson is a 79 y.o. male. HPI  Presents for pre op evaluation and follow up health problems. He will be having cataract surgery on both eyes 11/15 and 11/29 by Dr. Devon Katz. Hypercholesterolemia. Taking prescribed pravachol. Following healthy diet, stays active. HTN. Adhering to medication regimen.    T2DM. Taking glimeperide and metformin as prescribed. Has reduced sugar and processed carbs in diet. History of vertebral artery occlusion. Taking plavix as prescribed. Followed by neurology Dr. Katie Winter. Bladder cancer. Followed by oncology and urologist Dr. Lisa Varner. GERD. Under care of Dr. Selena Rodriguez. Taking prescribed PPI. 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 amLODIPine-valsartan (EXFORGE)  mg per tablet TAKE 1 TABLET BY MOUTH EVERY DAY    clopidogreL (PLAVIX) 75 mg tab TAKE 1 TABLET BY MOUTH EVERY DAY    metFORMIN ER (GLUCOPHAGE XR) 500 mg tablet TAKE 1 TABLET BY MOUTH TWICE A DAY WITH MEALS    pravastatin (PRAVACHOL) 10 mg tablet Take 1 Tablet by mouth nightly. Appointment due.     clotrimazole-betamethasone (LOTRISONE) topical cream APPLY TO AFFECTED AREA TWICE A DAY    ondansetron (ZOFRAN ODT) 4 mg disintegrating tablet TAKE 1 TABLET BY MOUTH EVERY 8 HOURS AS NEEDED FOR NAUSEA    finasteride (PROSCAR) 5 mg tablet TAKE 1 TABLET BY MOUTH EVERY DAY    diclofenac (VOLTAREN) 1 % gel Apply 4 g to affected area four (4) times daily. ACCU-CHEK GIANNI PLUS TEST STRP strip TEST DAILY AS DIRECTED *E11.9*    Bifidobacterium Infantis 4 mg cap Take  by mouth. cholecalciferol (VITAMIN D3) (1000 Units /25 mcg) tablet Take  by mouth daily. aspirin delayed-release 81 mg tablet Take 1 Tab by mouth daily. No current facility-administered medications for this visit. Social History     Tobacco Use    Smoking status: Former     Packs/day: 1.00     Years: 30.00     Pack years: 30.00     Types: Cigarettes     Quit date: 1992     Years since quittin.4    Smokeless tobacco: Never   Vaping Use    Vaping Use: Never used   Substance Use Topics    Alcohol use: No    Drug use: No     Blood pressure 129/60, pulse 71, temperature 97.6 °F (36.4 °C), temperature source Temporal, resp. rate 16, height 5' 9\" (1.753 m), weight 201 lb 3.2 oz (91.3 kg), SpO2 98 %. Review of Systems   Constitutional:  Negative for chills, fever and malaise/fatigue. Respiratory:  Negative for cough and shortness of breath. Cardiovascular:  Negative for chest pain, palpitations and leg swelling. Neurological:  Negative for dizziness and headaches. All other systems reviewed and are negative. Physical Exam  Constitutional:       General: He is not in acute distress. Cardiovascular:      Rate and Rhythm: Normal rate and regular rhythm. Heart sounds: Normal heart sounds. Pulmonary:      Effort: Pulmonary effort is normal.      Breath sounds: Normal breath sounds. Abdominal:      General: There is no distension. Palpations: Abdomen is soft. Tenderness: There is no abdominal tenderness. There is no guarding. Musculoskeletal:      Cervical back: Neck supple. Right lower leg: No edema. Left lower leg: No edema. Lymphadenopathy:      Cervical: No cervical adenopathy. Skin:     General: Skin is warm and dry.    Neurological:      Coordination: Coordination normal.   Psychiatric:         Mood and Affect: Mood normal.       ASSESSMENT and PLAN  Diagnoses and all orders for this visit:    1. Pre-op evaluation  Medically stable for surgery. Will fax completed form. 2. Type 2 diabetes mellitus without complication, without long-term current use of insulin (HCC)  -     HEMOGLOBIN A1C WITH EAG; Future  Controlled based on last A1C. Reviewed diabetic diet and carbohydrate restriction. 3. Primary urethral papillary carcinoma (HCC)  Condition stable. Follow up as scheduled with specialists. 4. Hypercholesterolemia  -     LIPID PANEL; Future  -     METABOLIC PANEL, COMPREHENSIVE; Future  Reviewed healthy diet and exercise recommendations. Fasting labs sent. 5. Benign essential HTN  -     METABOLIC PANEL, COMPREHENSIVE; Future  Controlled. Continue current treatment. 6. Vertebral artery occlusion, unspecified laterality  Condition stable. Followed by specialist.   7. Gastroesophageal reflux disease without esophagitis  Stable on PPI. GERD precautions reviewed. 8. Cortical age-related cataract of both eyes    Follow up 6 months.

## 2022-11-04 LAB
ALBUMIN SERPL-MCNC: 3.9 G/DL (ref 3.5–5)
ALBUMIN/GLOB SERPL: 1.4 {RATIO} (ref 1.1–2.2)
ALP SERPL-CCNC: 74 U/L (ref 45–117)
ALT SERPL-CCNC: 33 U/L (ref 12–78)
ANION GAP SERPL CALC-SCNC: 5 MMOL/L (ref 5–15)
AST SERPL-CCNC: 19 U/L (ref 15–37)
BILIRUB SERPL-MCNC: 0.5 MG/DL (ref 0.2–1)
BUN SERPL-MCNC: 20 MG/DL (ref 6–20)
BUN/CREAT SERPL: 19 (ref 12–20)
CALCIUM SERPL-MCNC: 8.8 MG/DL (ref 8.5–10.1)
CHLORIDE SERPL-SCNC: 110 MMOL/L (ref 97–108)
CHOLEST SERPL-MCNC: 124 MG/DL
CO2 SERPL-SCNC: 25 MMOL/L (ref 21–32)
CREAT SERPL-MCNC: 1.04 MG/DL (ref 0.7–1.3)
EST. AVERAGE GLUCOSE BLD GHB EST-MCNC: 160 MG/DL
GLOBULIN SER CALC-MCNC: 2.8 G/DL (ref 2–4)
GLUCOSE SERPL-MCNC: 140 MG/DL (ref 65–100)
HBA1C MFR BLD: 7.2 % (ref 4–5.6)
HDLC SERPL-MCNC: 47 MG/DL
HDLC SERPL: 2.6 {RATIO} (ref 0–5)
LDLC SERPL CALC-MCNC: 61.4 MG/DL (ref 0–100)
POTASSIUM SERPL-SCNC: 4.4 MMOL/L (ref 3.5–5.1)
PROT SERPL-MCNC: 6.7 G/DL (ref 6.4–8.2)
SODIUM SERPL-SCNC: 140 MMOL/L (ref 136–145)
TRIGL SERPL-MCNC: 78 MG/DL (ref ?–150)
VLDLC SERPL CALC-MCNC: 15.6 MG/DL

## 2023-01-01 DIAGNOSIS — E11.9 TYPE 2 DIABETES MELLITUS WITHOUT COMPLICATION, WITHOUT LONG-TERM CURRENT USE OF INSULIN (HCC): ICD-10-CM

## 2023-01-03 RX ORDER — METFORMIN HYDROCHLORIDE 500 MG/1
TABLET, EXTENDED RELEASE ORAL
Qty: 180 TABLET | Refills: 1 | Status: SHIPPED | OUTPATIENT
Start: 2023-01-03

## 2023-01-26 RX ORDER — CLOPIDOGREL BISULFATE 75 MG/1
TABLET ORAL
Qty: 90 TABLET | Refills: 1 | Status: SHIPPED | OUTPATIENT
Start: 2023-01-26

## 2023-02-09 PROBLEM — N40.1 BPH WITH URINARY OBSTRUCTION: Status: ACTIVE | Noted: 2023-02-09

## 2023-02-09 PROBLEM — N13.8 BPH WITH URINARY OBSTRUCTION: Status: ACTIVE | Noted: 2023-02-09

## 2023-05-04 ENCOUNTER — OFFICE VISIT (OUTPATIENT)
Dept: FAMILY MEDICINE CLINIC | Age: 83
End: 2023-05-04
Payer: MEDICARE

## 2023-05-04 VITALS
RESPIRATION RATE: 17 BRPM | BODY MASS INDEX: 29.62 KG/M2 | DIASTOLIC BLOOD PRESSURE: 68 MMHG | OXYGEN SATURATION: 98 % | TEMPERATURE: 98.2 F | HEART RATE: 64 BPM | WEIGHT: 200 LBS | HEIGHT: 69 IN | SYSTOLIC BLOOD PRESSURE: 138 MMHG

## 2023-05-04 DIAGNOSIS — E78.00 HYPERCHOLESTEROLEMIA: ICD-10-CM

## 2023-05-04 DIAGNOSIS — Z00.00 MEDICARE ANNUAL WELLNESS VISIT, SUBSEQUENT: Primary | ICD-10-CM

## 2023-05-04 DIAGNOSIS — C68.0 PRIMARY URETHRAL PAPILLARY CARCINOMA (HCC): ICD-10-CM

## 2023-05-04 DIAGNOSIS — I10 BENIGN ESSENTIAL HTN: ICD-10-CM

## 2023-05-04 DIAGNOSIS — K21.9 GASTROESOPHAGEAL REFLUX DISEASE WITHOUT ESOPHAGITIS: ICD-10-CM

## 2023-05-04 DIAGNOSIS — E11.9 TYPE 2 DIABETES MELLITUS WITHOUT COMPLICATION, WITHOUT LONG-TERM CURRENT USE OF INSULIN (HCC): ICD-10-CM

## 2023-05-04 DIAGNOSIS — I65.09 VERTEBRAL ARTERY OCCLUSION, UNSPECIFIED LATERALITY: ICD-10-CM

## 2023-05-04 PROCEDURE — G0463 HOSPITAL OUTPT CLINIC VISIT: HCPCS | Performed by: NURSE PRACTITIONER

## 2023-05-05 LAB
ALBUMIN SERPL-MCNC: 4.2 G/DL (ref 3.5–5)
ALBUMIN/GLOB SERPL: 1.4 (ref 1.1–2.2)
ALP SERPL-CCNC: 72 U/L (ref 45–117)
ALT SERPL-CCNC: 42 U/L (ref 12–78)
ANION GAP SERPL CALC-SCNC: 4 MMOL/L (ref 5–15)
AST SERPL-CCNC: 33 U/L (ref 15–37)
BILIRUB SERPL-MCNC: 0.7 MG/DL (ref 0.2–1)
BUN SERPL-MCNC: 17 MG/DL (ref 6–20)
BUN/CREAT SERPL: 16 (ref 12–20)
CALCIUM SERPL-MCNC: 8.9 MG/DL (ref 8.5–10.1)
CHLORIDE SERPL-SCNC: 108 MMOL/L (ref 97–108)
CHOLEST SERPL-MCNC: 110 MG/DL
CO2 SERPL-SCNC: 26 MMOL/L (ref 21–32)
CREAT SERPL-MCNC: 1.09 MG/DL (ref 0.7–1.3)
CREAT UR-MCNC: 66.2 MG/DL
EST. AVERAGE GLUCOSE BLD GHB EST-MCNC: 154 MG/DL
GLOBULIN SER CALC-MCNC: 2.9 G/DL (ref 2–4)
GLUCOSE SERPL-MCNC: 140 MG/DL (ref 65–100)
HBA1C MFR BLD: 7 % (ref 4–5.6)
HDLC SERPL-MCNC: 48 MG/DL
HDLC SERPL: 2.3 (ref 0–5)
LDLC SERPL CALC-MCNC: 46 MG/DL (ref 0–100)
MICROALBUMIN UR-MCNC: 0.62 MG/DL
MICROALBUMIN/CREAT UR-RTO: 9 MG/G (ref 0–30)
POTASSIUM SERPL-SCNC: 4.3 MMOL/L (ref 3.5–5.1)
PROT SERPL-MCNC: 7.1 G/DL (ref 6.4–8.2)
SODIUM SERPL-SCNC: 138 MMOL/L (ref 136–145)
TRIGL SERPL-MCNC: 80 MG/DL (ref ?–150)
VLDLC SERPL CALC-MCNC: 16 MG/DL

## 2023-05-14 DIAGNOSIS — E78.00 PURE HYPERCHOLESTEROLEMIA, UNSPECIFIED: ICD-10-CM

## 2023-05-14 DIAGNOSIS — E66.3 OVERWEIGHT: ICD-10-CM

## 2023-05-14 DIAGNOSIS — I65.09 OCCLUSION AND STENOSIS OF UNSPECIFIED VERTEBRAL ARTERY: ICD-10-CM

## 2023-05-15 ENCOUNTER — TELEPHONE (OUTPATIENT)
Age: 83
End: 2023-05-15

## 2023-05-16 ENCOUNTER — TELEPHONE (OUTPATIENT)
Age: 83
End: 2023-05-16

## 2023-05-16 RX ORDER — AMLODIPINE AND VALSARTAN 10; 320 MG/1; MG/1
TABLET ORAL
Qty: 90 TABLET | Refills: 1 | Status: SHIPPED | OUTPATIENT
Start: 2023-05-16

## 2023-05-16 RX ORDER — PRAVASTATIN SODIUM 10 MG
TABLET ORAL
Qty: 90 TABLET | Refills: 1 | Status: SHIPPED | OUTPATIENT
Start: 2023-05-16

## 2023-05-31 DIAGNOSIS — K21.9 GASTRO-ESOPHAGEAL REFLUX DISEASE WITHOUT ESOPHAGITIS: ICD-10-CM

## 2023-05-31 DIAGNOSIS — E11.9 TYPE 2 DIABETES MELLITUS WITHOUT COMPLICATIONS (HCC): ICD-10-CM

## 2023-05-31 RX ORDER — GLIMEPIRIDE 4 MG/1
TABLET ORAL
Qty: 90 TABLET | Refills: 1 | Status: SHIPPED | OUTPATIENT
Start: 2023-05-31

## 2023-05-31 RX ORDER — LANSOPRAZOLE 30 MG/1
CAPSULE, DELAYED RELEASE ORAL
Qty: 90 CAPSULE | Refills: 1 | Status: SHIPPED | OUTPATIENT
Start: 2023-05-31

## 2023-05-31 NOTE — TELEPHONE ENCOUNTER
Chief Complaint   Patient presents with    Medication Refill     Last Office visit 11/03/2022  No Upcoming appointments to follow up

## 2023-07-07 DIAGNOSIS — E11.9 TYPE 2 DIABETES MELLITUS WITHOUT COMPLICATIONS (HCC): ICD-10-CM

## 2023-07-07 RX ORDER — METFORMIN HYDROCHLORIDE 500 MG/1
TABLET, EXTENDED RELEASE ORAL
Qty: 180 TABLET | Refills: 0 | Status: SHIPPED | OUTPATIENT
Start: 2023-07-07

## 2023-07-07 NOTE — TELEPHONE ENCOUNTER
Chief Complaint   Patient presents with    Medication Refill     Last Office visit 05/04/2023  Next follow Up none on chart at this time.

## 2023-07-27 RX ORDER — CLOPIDOGREL BISULFATE 75 MG/1
TABLET ORAL
Qty: 90 TABLET | Refills: 0 | Status: SHIPPED | OUTPATIENT
Start: 2023-07-27

## 2023-10-07 DIAGNOSIS — E11.9 TYPE 2 DIABETES MELLITUS WITHOUT COMPLICATIONS (HCC): ICD-10-CM

## 2023-10-08 RX ORDER — METFORMIN HYDROCHLORIDE 500 MG/1
500 TABLET, EXTENDED RELEASE ORAL 2 TIMES DAILY WITH MEALS
Qty: 180 TABLET | Refills: 0 | Status: SHIPPED | OUTPATIENT
Start: 2023-10-08

## 2023-10-27 RX ORDER — CLOPIDOGREL BISULFATE 75 MG/1
TABLET ORAL
Qty: 90 TABLET | Refills: 0 | Status: SHIPPED | OUTPATIENT
Start: 2023-10-27

## 2023-10-27 NOTE — TELEPHONE ENCOUNTER
PCP: NESHA Lopez NP    Last appt: 5/4/2023       Future Appointments   Date Time Provider [address]   11/2/2023 10:40 AM Sintia Fry APRN - NP [facility]       Requested Prescriptions     Pending Prescriptions Disp Refills    clopidogrel (PLAVIX) 75 MG tablet [Pharmacy Med Name: CLOPIDOGREL 75 MG TABLET] 90 tablet 0     Sig: TAKE 1 TABLET BY MOUTH EVERY DAY       Prior labs and Blood pressures:  BP Readings from Last 3 Encounters:   05/04/23 138/68   11/03/22 129/60   06/27/22 123/67     Lab Results   Component Value Date/Time     05/04/2023 08:39 AM    K 4.3 05/04/2023 08:39 AM     05/04/2023 08:39 AM    CO2 26 05/04/2023 08:39 AM    BUN 17 05/04/2023 08:39 AM    GFRAA >60 06/27/2022 09:00 AM     No results found for: \"HBA1C\", \"[id number]\"  Lab Results   Component Value Date/Time    CHOL 110 05/04/2023 08:39 AM    HDL 48 05/04/2023 08:39 AM     No results found for: \"VITD3\", \"VD3RIA\"    No results found for: \"TSH\", \"TSH2\", \"TSH3\"

## 2023-11-02 ENCOUNTER — OFFICE VISIT (OUTPATIENT)
Age: 83
End: 2023-11-02
Payer: MEDICARE

## 2023-11-02 VITALS
OXYGEN SATURATION: 98 % | HEART RATE: 63 BPM | TEMPERATURE: 97.6 F | WEIGHT: 198.8 LBS | DIASTOLIC BLOOD PRESSURE: 60 MMHG | SYSTOLIC BLOOD PRESSURE: 131 MMHG | HEIGHT: 69 IN | RESPIRATION RATE: 16 BRPM | BODY MASS INDEX: 29.44 KG/M2

## 2023-11-02 DIAGNOSIS — C68.0 PRIMARY URETHRAL PAPILLARY CARCINOMA (HCC): ICD-10-CM

## 2023-11-02 DIAGNOSIS — E11.9 TYPE 2 DIABETES MELLITUS WITHOUT COMPLICATION, WITHOUT LONG-TERM CURRENT USE OF INSULIN (HCC): Primary | ICD-10-CM

## 2023-11-02 DIAGNOSIS — I10 BENIGN ESSENTIAL HTN: ICD-10-CM

## 2023-11-02 DIAGNOSIS — E78.00 HYPERCHOLESTEROLEMIA: ICD-10-CM

## 2023-11-02 DIAGNOSIS — I65.09 VERTEBRAL ARTERY OCCLUSION, UNSPECIFIED LATERALITY: ICD-10-CM

## 2023-11-02 DIAGNOSIS — K21.9 GASTROESOPHAGEAL REFLUX DISEASE WITHOUT ESOPHAGITIS: ICD-10-CM

## 2023-11-02 DIAGNOSIS — Z23 NEEDS FLU SHOT: ICD-10-CM

## 2023-11-02 PROCEDURE — 90694 VACC AIIV4 NO PRSRV 0.5ML IM: CPT | Performed by: NURSE PRACTITIONER

## 2023-11-02 PROCEDURE — 99214 OFFICE O/P EST MOD 30 MIN: CPT | Performed by: NURSE PRACTITIONER

## 2023-11-02 RX ORDER — BLOOD SUGAR DIAGNOSTIC
STRIP MISCELLANEOUS
COMMUNITY
Start: 2019-11-12

## 2023-11-02 ASSESSMENT — ENCOUNTER SYMPTOMS
CHEST TIGHTNESS: 0
SHORTNESS OF BREATH: 0

## 2023-11-02 NOTE — PROGRESS NOTES
Subjective:      Patient ID: Meliton Ardon is a 79 y.o. male. HPI  6 month follow up health problems  Hypercholesterolemia. Taking prescribed pravachol. Following healthy diet, stays active. HTN. Adhering to medication regimen.    T2DM. Taking glimeperide and metformin as prescribed. Reports interim FBS averaging 140's. History of vertebral artery occlusion. Taking plavix as prescribed. Recurrent bladder cancer. Followed by oncology and urologist Dr. Bimal Harrington. GERD. Under care of Dr. Freya Schafer. Taking prescribed PPI. Has occasional flare of symptoms. 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HCC)    BPH with urinary obstruction     Current Outpatient Medications   Medication Sig    blood glucose test strips (EXACTECH TEST) strip TEST DAILY AS DIRECTED *E11.9*    clopidogrel (PLAVIX) 75 MG tablet TAKE 1 TABLET BY MOUTH EVERY DAY    metFORMIN (GLUCOPHAGE-XR) 500 MG extended release tablet TAKE 1 TABLET BY MOUTH TWICE A DAY WITH FOOD    ondansetron (ZOFRAN-ODT) 4 MG disintegrating tablet Take 1 tablet by mouth every 8 hours as needed for Nausea for nausea    glimepiride (AMARYL) 4 MG tablet TAKE 1 TABLET BY MOUTH EVERY DAY IN THE MORNING    lansoprazole (PREVACID) 30 MG delayed release capsule TAKE 1 CAP BY MOUTH DAILY (BEFORE BREAKFAST).     amLODIPine-valsartan (EXFORGE)  MG per tablet TAKE 1 TABLET BY MOUTH EVERY DAY    pravastatin (PRAVACHOL) 10 MG tablet TAKE 1 TABLET BY MOUTH NIGHTLY    aspirin 81 MG EC tablet Take by mouth daily    vitamin D (CHOLECALCIFEROL) 25 MCG (1000 UT) TABS tablet Take by mouth daily    clotrimazole-betamethasone (LOTRISONE) 1-0.05 % cream APPLY TO AFFECTED AREA TWICE A DAY

## 2023-11-02 NOTE — PROGRESS NOTES
After obtaining consent, and per orders of GLORIA ZELAYA,NP injection of FLUAD VACCINE given in Left deltoid by Haydee Levine MA. Patient instructed to remain in clinic for 20 minutes afterwards, and to report any adverse reaction to me immediately.

## 2023-11-03 LAB
ALBUMIN SERPL-MCNC: 4.3 G/DL (ref 3.5–5)
ALBUMIN/GLOB SERPL: 1.6 (ref 1.1–2.2)
ALP SERPL-CCNC: 76 U/L (ref 45–117)
ALT SERPL-CCNC: 30 U/L (ref 12–78)
ANION GAP SERPL CALC-SCNC: 4 MMOL/L (ref 5–15)
AST SERPL-CCNC: 18 U/L (ref 15–37)
BILIRUB SERPL-MCNC: 0.6 MG/DL (ref 0.2–1)
BUN SERPL-MCNC: 17 MG/DL (ref 6–20)
BUN/CREAT SERPL: 17 (ref 12–20)
CALCIUM SERPL-MCNC: 9.2 MG/DL (ref 8.5–10.1)
CHLORIDE SERPL-SCNC: 111 MMOL/L (ref 97–108)
CO2 SERPL-SCNC: 26 MMOL/L (ref 21–32)
CREAT SERPL-MCNC: 0.98 MG/DL (ref 0.7–1.3)
EST. AVERAGE GLUCOSE BLD GHB EST-MCNC: 157 MG/DL
GLOBULIN SER CALC-MCNC: 2.7 G/DL (ref 2–4)
GLUCOSE SERPL-MCNC: 108 MG/DL (ref 65–100)
HBA1C MFR BLD: 7.1 % (ref 4–5.6)
POTASSIUM SERPL-SCNC: 4.5 MMOL/L (ref 3.5–5.1)
PROT SERPL-MCNC: 7 G/DL (ref 6.4–8.2)
SODIUM SERPL-SCNC: 141 MMOL/L (ref 136–145)

## 2023-11-13 DIAGNOSIS — E78.00 PURE HYPERCHOLESTEROLEMIA, UNSPECIFIED: ICD-10-CM

## 2023-11-13 RX ORDER — PRAVASTATIN SODIUM 10 MG
TABLET ORAL
Qty: 90 TABLET | Refills: 1 | Status: SHIPPED | OUTPATIENT
Start: 2023-11-13

## 2023-11-13 NOTE — TELEPHONE ENCOUNTER
PCP: Shaheen Handley, APRN - NP      No future appointments.     Requested Prescriptions     Pending Prescriptions Disp Refills    pravastatin (PRAVACHOL) 10 MG tablet [Pharmacy Med Name: PRAVASTATIN SODIUM 10 MG TAB] 90 tablet 1     Sig: TAKE 1 TABLET BY MOUTH NIGHTLY      Last seen at 11/02/2023

## 2023-11-26 DIAGNOSIS — K21.9 GASTRO-ESOPHAGEAL REFLUX DISEASE WITHOUT ESOPHAGITIS: ICD-10-CM

## 2023-11-26 DIAGNOSIS — E11.9 TYPE 2 DIABETES MELLITUS WITHOUT COMPLICATIONS (HCC): ICD-10-CM

## 2023-11-26 RX ORDER — GLIMEPIRIDE 4 MG/1
TABLET ORAL
Qty: 90 TABLET | Refills: 1 | Status: SHIPPED | OUTPATIENT
Start: 2023-11-26

## 2023-11-26 RX ORDER — LANSOPRAZOLE 30 MG/1
CAPSULE, DELAYED RELEASE ORAL
Qty: 90 CAPSULE | Refills: 1 | Status: SHIPPED | OUTPATIENT
Start: 2023-11-26

## 2023-12-06 DIAGNOSIS — I65.09 OCCLUSION AND STENOSIS OF UNSPECIFIED VERTEBRAL ARTERY: ICD-10-CM

## 2023-12-06 DIAGNOSIS — E66.3 OVERWEIGHT: ICD-10-CM

## 2023-12-06 RX ORDER — AMLODIPINE AND VALSARTAN 10; 320 MG/1; MG/1
1 TABLET ORAL DAILY
Qty: 90 TABLET | Refills: 1 | Status: SHIPPED | OUTPATIENT
Start: 2023-12-06

## 2023-12-06 NOTE — TELEPHONE ENCOUNTER
Last appointment: 11/2/23 NP Smita Lange  Next appointment: None  Previous refill encounter(s): 5/16/23 90 + 1    Requested Prescriptions     Pending Prescriptions Disp Refills    amLODIPine-valsartan (EXFORGE)  MG per tablet 90 tablet 1     Sig: Take 1 tablet by mouth daily     For Pharmacy Admin Tracking Only    Program: Medication Refill  CPA in place:    Recommendation Provided To:    Intervention Detail: New Rx: 1, reason: Patient Preference  Intervention Accepted By:   Karsten Arzate Closed?:    Time Spent (min): 5

## 2023-12-11 RX ORDER — CLOTRIMAZOLE AND BETAMETHASONE DIPROPIONATE 10; .64 MG/G; MG/G
CREAM TOPICAL
Qty: 60 G | Refills: 1 | Status: SHIPPED | OUTPATIENT
Start: 2023-12-11

## 2023-12-11 RX ORDER — ONDANSETRON 4 MG/1
4 TABLET, ORALLY DISINTEGRATING ORAL EVERY 8 HOURS PRN
Qty: 30 TABLET | Refills: 2 | Status: SHIPPED | OUTPATIENT
Start: 2023-12-11

## 2023-12-11 NOTE — TELEPHONE ENCOUNTER
PCP: NESHA Robin NP      No future appointments.     Requested Prescriptions     Pending Prescriptions Disp Refills    ondansetron (ZOFRAN-ODT) 4 MG disintegrating tablet [Pharmacy Med Name: ONDANSETRON ODT 4 MG TABLET] 30 tablet 0     Sig: TAKE 1 TABLET BY MOUTH EVERY 8 HOURS AS NEEDED FOR NAUSEA    clotrimazole-betamethasone (LOTRISONE) 1-0.05 % cream [Pharmacy Med Name: CLOTRIMAZOLE-BETAMETHASONE CRM] 60 g      Sig: APPLY TO AFFECTED AREA TWICE A DAY      Last seen at 11/02/2023

## 2024-01-05 DIAGNOSIS — E11.9 TYPE 2 DIABETES MELLITUS WITHOUT COMPLICATIONS (HCC): ICD-10-CM

## 2024-01-05 RX ORDER — METFORMIN HYDROCHLORIDE 500 MG/1
500 TABLET, EXTENDED RELEASE ORAL 2 TIMES DAILY WITH MEALS
Qty: 180 TABLET | Refills: 1 | Status: SHIPPED | OUTPATIENT
Start: 2024-01-05

## 2024-01-28 RX ORDER — CLOPIDOGREL BISULFATE 75 MG/1
TABLET ORAL
Qty: 90 TABLET | Refills: 1 | Status: SHIPPED | OUTPATIENT
Start: 2024-01-28

## 2024-02-13 RX ORDER — CLOTRIMAZOLE AND BETAMETHASONE DIPROPIONATE 10; .64 MG/G; MG/G
CREAM TOPICAL
Qty: 60 G | Refills: 1 | Status: SHIPPED | OUTPATIENT
Start: 2024-02-13

## 2024-02-13 NOTE — TELEPHONE ENCOUNTER
Request for refill clotrimazole/betamethasone cream.  Leann Mora    Last appointment: 11/2/23 [place]  Next appointment: None  Previous refill encounter(s): 12/11/23 60g + 1    Requested Prescriptions     Pending Prescriptions Disp Refills    clotrimazole-betamethasone (LOTRISONE) 1-0.05 % cream 60 g 1     Sig: Apply thin layer to affected area twice daily.     For Pharmacy Admin Tracking Only    Program: Medication Refill  CPA in place:    Recommendation Provided To:   Intervention Detail: New Rx: 1, reason: Patient Preference  Intervention Accepted By:   Gap Closed?:    Time Spent (min): 5

## 2024-03-04 SDOH — ECONOMIC STABILITY: HOUSING INSECURITY
IN THE LAST 12 MONTHS, WAS THERE A TIME WHEN YOU DID NOT HAVE A STEADY PLACE TO SLEEP OR SLEPT IN A SHELTER (INCLUDING NOW)?: NO

## 2024-03-04 SDOH — ECONOMIC STABILITY: FOOD INSECURITY: WITHIN THE PAST 12 MONTHS, THE FOOD YOU BOUGHT JUST DIDN'T LAST AND YOU DIDN'T HAVE MONEY TO GET MORE.: NEVER TRUE

## 2024-03-04 SDOH — ECONOMIC STABILITY: INCOME INSECURITY: HOW HARD IS IT FOR YOU TO PAY FOR THE VERY BASICS LIKE FOOD, HOUSING, MEDICAL CARE, AND HEATING?: NOT HARD AT ALL

## 2024-03-04 SDOH — ECONOMIC STABILITY: FOOD INSECURITY: WITHIN THE PAST 12 MONTHS, YOU WORRIED THAT YOUR FOOD WOULD RUN OUT BEFORE YOU GOT MONEY TO BUY MORE.: NEVER TRUE

## 2024-03-04 SDOH — ECONOMIC STABILITY: TRANSPORTATION INSECURITY
IN THE PAST 12 MONTHS, HAS LACK OF TRANSPORTATION KEPT YOU FROM MEETINGS, WORK, OR FROM GETTING THINGS NEEDED FOR DAILY LIVING?: NO

## 2024-03-07 ENCOUNTER — OFFICE VISIT (OUTPATIENT)
Age: 84
End: 2024-03-07
Payer: MEDICARE

## 2024-03-07 VITALS
HEART RATE: 80 BPM | HEIGHT: 69 IN | RESPIRATION RATE: 16 BRPM | WEIGHT: 198.2 LBS | OXYGEN SATURATION: 96 % | BODY MASS INDEX: 29.36 KG/M2 | SYSTOLIC BLOOD PRESSURE: 122 MMHG | TEMPERATURE: 98.6 F | DIASTOLIC BLOOD PRESSURE: 56 MMHG

## 2024-03-07 DIAGNOSIS — C68.0 PRIMARY URETHRAL PAPILLARY CARCINOMA (HCC): ICD-10-CM

## 2024-03-07 DIAGNOSIS — N40.1 BPH WITH URINARY OBSTRUCTION: ICD-10-CM

## 2024-03-07 DIAGNOSIS — M54.41 ACUTE MIDLINE LOW BACK PAIN WITH BILATERAL SCIATICA: ICD-10-CM

## 2024-03-07 DIAGNOSIS — I10 BENIGN ESSENTIAL HTN: Primary | ICD-10-CM

## 2024-03-07 DIAGNOSIS — N13.8 BPH WITH URINARY OBSTRUCTION: ICD-10-CM

## 2024-03-07 DIAGNOSIS — E78.00 HYPERCHOLESTEROLEMIA: ICD-10-CM

## 2024-03-07 DIAGNOSIS — E11.9 TYPE 2 DIABETES MELLITUS WITHOUT COMPLICATION, WITHOUT LONG-TERM CURRENT USE OF INSULIN (HCC): ICD-10-CM

## 2024-03-07 DIAGNOSIS — K21.9 GASTROESOPHAGEAL REFLUX DISEASE WITHOUT ESOPHAGITIS: ICD-10-CM

## 2024-03-07 DIAGNOSIS — I65.09 VERTEBRAL ARTERY OCCLUSION, UNSPECIFIED LATERALITY: ICD-10-CM

## 2024-03-07 DIAGNOSIS — M54.42 ACUTE MIDLINE LOW BACK PAIN WITH BILATERAL SCIATICA: ICD-10-CM

## 2024-03-07 LAB
ALBUMIN SERPL-MCNC: 4.2 G/DL (ref 3.5–5)
ALBUMIN/GLOB SERPL: 1.6 (ref 1.1–2.2)
ALP SERPL-CCNC: 82 U/L (ref 45–117)
ALT SERPL-CCNC: 23 U/L (ref 12–78)
ANION GAP SERPL CALC-SCNC: 8 MMOL/L (ref 5–15)
AST SERPL-CCNC: 14 U/L (ref 15–37)
BILIRUB SERPL-MCNC: 0.6 MG/DL (ref 0.2–1)
BUN SERPL-MCNC: 18 MG/DL (ref 6–20)
BUN/CREAT SERPL: 17 (ref 12–20)
CALCIUM SERPL-MCNC: 9.1 MG/DL (ref 8.5–10.1)
CHLORIDE SERPL-SCNC: 109 MMOL/L (ref 97–108)
CHOLEST SERPL-MCNC: 119 MG/DL
CO2 SERPL-SCNC: 25 MMOL/L (ref 21–32)
CREAT SERPL-MCNC: 1.06 MG/DL (ref 0.7–1.3)
EST. AVERAGE GLUCOSE BLD GHB EST-MCNC: 157 MG/DL
GLOBULIN SER CALC-MCNC: 2.6 G/DL (ref 2–4)
GLUCOSE SERPL-MCNC: 139 MG/DL (ref 65–100)
HBA1C MFR BLD: 7.1 % (ref 4–5.6)
HDLC SERPL-MCNC: 52 MG/DL
HDLC SERPL: 2.3 (ref 0–5)
LDLC SERPL CALC-MCNC: 53.4 MG/DL (ref 0–100)
POTASSIUM SERPL-SCNC: 4.6 MMOL/L (ref 3.5–5.1)
PROT SERPL-MCNC: 6.8 G/DL (ref 6.4–8.2)
SODIUM SERPL-SCNC: 142 MMOL/L (ref 136–145)
TRIGL SERPL-MCNC: 68 MG/DL
VLDLC SERPL CALC-MCNC: 13.6 MG/DL

## 2024-03-07 PROCEDURE — 99214 OFFICE O/P EST MOD 30 MIN: CPT | Performed by: NURSE PRACTITIONER

## 2024-03-07 PROCEDURE — 3074F SYST BP LT 130 MM HG: CPT | Performed by: NURSE PRACTITIONER

## 2024-03-07 PROCEDURE — G8427 DOCREV CUR MEDS BY ELIG CLIN: HCPCS | Performed by: NURSE PRACTITIONER

## 2024-03-07 PROCEDURE — G8484 FLU IMMUNIZE NO ADMIN: HCPCS | Performed by: NURSE PRACTITIONER

## 2024-03-07 PROCEDURE — 1123F ACP DISCUSS/DSCN MKR DOCD: CPT | Performed by: NURSE PRACTITIONER

## 2024-03-07 PROCEDURE — 3078F DIAST BP <80 MM HG: CPT | Performed by: NURSE PRACTITIONER

## 2024-03-07 PROCEDURE — 1036F TOBACCO NON-USER: CPT | Performed by: NURSE PRACTITIONER

## 2024-03-07 PROCEDURE — G8419 CALC BMI OUT NRM PARAM NOF/U: HCPCS | Performed by: NURSE PRACTITIONER

## 2024-03-07 RX ORDER — FAMOTIDINE 20 MG/1
20 TABLET, FILM COATED ORAL NIGHTLY
Qty: 90 TABLET | Refills: 1 | Status: SHIPPED | OUTPATIENT
Start: 2024-03-07

## 2024-03-07 ASSESSMENT — PATIENT HEALTH QUESTIONNAIRE - PHQ9
SUM OF ALL RESPONSES TO PHQ QUESTIONS 1-9: 0
SUM OF ALL RESPONSES TO PHQ QUESTIONS 1-9: 0
SUM OF ALL RESPONSES TO PHQ9 QUESTIONS 1 & 2: 0
SUM OF ALL RESPONSES TO PHQ QUESTIONS 1-9: 0
SUM OF ALL RESPONSES TO PHQ QUESTIONS 1-9: 0
1. LITTLE INTEREST OR PLEASURE IN DOING THINGS: 0
2. FEELING DOWN, DEPRESSED OR HOPELESS: 0

## 2024-03-07 ASSESSMENT — ENCOUNTER SYMPTOMS
BLOOD IN STOOL: 0
BACK PAIN: 1
NAUSEA: 1
SHORTNESS OF BREATH: 0
CHEST TIGHTNESS: 0
ABDOMINAL PAIN: 0

## 2024-03-07 NOTE — PROGRESS NOTES
Chief Complaint   Patient presents with    Back Pain     Worse in the morning and get better as he start to move    Abdominal Pain     In the lower abdomen      \"Have you been to the ER, urgent care clinic since your last visit?  Hospitalized since your last visit?\"    NO    “Have you seen or consulted any other health care providers outside of [facility] since your last visit?”    NO                 3/7/2024     9:14 AM   PHQ-9    Little interest or pleasure in doing things 0   Feeling down, depressed, or hopeless 0   PHQ-2 Score 0   PHQ-9 Total Score 0           Financial Resource Strain: Low Risk  (3/4/2024)    Overall Financial Resource Strain (CARDIA)     Difficulty of Paying Living Expenses: Not hard at all      Food Insecurity: Not on file (3/4/2024)          Health Maintenance Due   Topic Date Due    Respiratory Syncytial Virus (RSV) Pregnant or age 60 yrs+ (1 - 1-dose 60+ series) Never done    Shingles vaccine (2 of 3) 04/23/2008    Diabetic retinal exam  06/25/2015    Pneumococcal 65+ years Vaccine (2 - PPSV23 or PCV20) 09/19/2017    Diabetic foot exam  06/27/2023    COVID-19 Vaccine (4 - 2023-24 season) 09/01/2023    Depression Screen  11/03/2023       
back: Tenderness (lumbosacral paraspinals) present. No bony tenderness. Normal range of motion. Negative right straight leg raise test and negative left straight leg raise test.      Right lower leg: No edema.      Left lower leg: No edema.   Lymphadenopathy:      Cervical: No cervical adenopathy.   Skin:     General: Skin is warm and dry.   Neurological:      Mental Status: He is alert.      Coordination: Coordination normal.   Psychiatric:         Mood and Affect: Mood normal.         Assessment / Plan:      1. Benign essential HTN  -     Comprehensive Metabolic Panel; Future  Controlled.  Continue current treatment.  2. Primary urethral papillary carcinoma (HCC)   Has had frequent recurrence of symptoms.  Follow up with urology as scheduled.  3. Type 2 diabetes mellitus without complication, without long-term current use of insulin (HCC)  -     Hemoglobin A1C; Future  Stable based on last A1C.    4. Gastroesophageal reflux disease without esophagitis  -     famotidine (PEPCID) 20 MG tablet; Take 1 tablet by mouth at bedtime, Disp-90 tablet, R-1Normal  Exacerbation of symptoms on PPI.  Trial famotidine daily.  Follow up with GI if symptoms DNI.  5. Hypercholesterolemia  -     Comprehensive Metabolic Panel; Future  -     Lipid Panel; Future  Continue statin.  Recheck fasting labs.  6. BPH with urinary obstruction   Stable on current meds  7. Vertebral artery occlusion, unspecified laterality   Condition stable.  Continue plavix.  8. Acute midline low back pain with bilateral sciatica  -     XR LUMBAR SPINE (2-3 VIEWS); Future  -     [facility] - Physical Therapy at [facility]   Probable DDD with stenosis.  Will check xray today.  Begin PT.  Ortho referral prn.      Follow up 2 months for AWV.

## 2024-03-08 ENCOUNTER — TELEPHONE (OUTPATIENT)
Age: 84
End: 2024-03-08

## 2024-03-08 NOTE — TELEPHONE ENCOUNTER
Tried to contact pt [phone] to reschedule appt he has 5/9/24 due to pcp out of office, phone kept ringing and wouldn't go to - 3/8/24

## 2024-03-21 ENCOUNTER — HOSPITAL ENCOUNTER (OUTPATIENT)
Facility: HOSPITAL | Age: 84
Setting detail: RECURRING SERIES
Discharge: HOME | End: 2024-03-24
Payer: MEDICARE

## 2024-03-21 PROCEDURE — 97140 MANUAL THERAPY 1/> REGIONS: CPT

## 2024-03-21 PROCEDURE — 97161 PT EVAL LOW COMPLEX 20 MIN: CPT

## 2024-03-21 PROCEDURE — 97110 THERAPEUTIC EXERCISES: CPT

## 2024-03-21 NOTE — THERAPY EVALUATION
Physical Therapy at [facility],   a part of [facility]  [address] [id number]  Phone:[phone] Fax:[phone]                                                                            PHYSICAL THERAPY - MEDICARE EVALUATION/PLAN OF CARE NOTE (updated 3/23)      Date: 3/21/2024          Patient Name:  Jeffrey Driver :  1940   Medical   Diagnosis:  Acute midline low back pain with bilateral sciatica [M54.42, M54.41] Treatment Diagnosis:  M54.32  SCIATICA, LEFT SIDE and M54.59  OTHER LOWER BACK PAIN    Referral Source:  Tg Fry, APR* Provider #:  [id number]                  Insurance: Payor: MEDICARE / Plan: MEDICARE PART A AND B / Product Type: *No Product type* /      Patient  verified yes     Visit #   Current  / Total 1 16   Time   In / Out 8:15 am 9:10 am   Total Treatment Time 55   Total Timed Codes 30   1:1 Treatment Time 30      [facility] Totals Reminder:  bill using total billable   min of TIMED therapeutic procedures and modalities.   8-22 min = 1 unit; 23-37 min = 2 units; 38-52 min = 3 units;  53-67 min = 4 units; 68-82 min = 5 units           SUBJECTIVE  Pain Level (0-10 scale): 4  []constant []intermittent []improving []worsening []no change since onset    Any medication changes, allergies to medications, adverse drug reactions, diagnosis change, or new procedure performed?: [x] No    [] Yes (see summary sheet for update)  Medications: Verified on Patient Summary List    Subjective functional status/changes:     3 surgeries last year, wonders if it's related.  History of sciatica in the past, treated with steroids with good relief.      Start of Care: 3/21/2024  Onset Date: 6 months ago  Current symptoms/Complaints: Left buttock and posterior thigh pain, especially when he wakes up.  When this pain is present, it makes it very difficult to walk.  He does have some central back pain.  He is able to do yard work, but then hurts

## 2024-05-09 DIAGNOSIS — E78.00 PURE HYPERCHOLESTEROLEMIA, UNSPECIFIED: ICD-10-CM

## 2024-05-09 RX ORDER — PRAVASTATIN SODIUM 10 MG
10 TABLET ORAL NIGHTLY
Qty: 90 TABLET | Refills: 1 | Status: SHIPPED | OUTPATIENT
Start: 2024-05-09

## 2024-05-09 NOTE — TELEPHONE ENCOUNTER
PCP: Tg Fry APRN - NP      Future Appointments   Date Time Provider Department Center   5/13/2024  9:00 AM Tg Fry APRN - NP [facility]       Requested Prescriptions     Pending Prescriptions Disp Refills    pravastatin (PRAVACHOL) 10 MG tablet [Pharmacy Med Name: PRAVASTATIN SODIUM 10 MG TAB] 90 tablet 1     Sig: TAKE 1 TABLET BY MOUTH EVERY DAY AT NIGHT       Prior labs and Blood pressures:  BP Readings from Last 3 Encounters:   03/07/24 (!) 122/56   11/02/23 131/60   05/04/23 138/68     Lab Results   Component Value Date/Time     03/07/2024 09:43 AM    K 4.6 03/07/2024 09:43 AM     03/07/2024 09:43 AM    CO2 25 03/07/2024 09:43 AM    BUN 18 03/07/2024 09:43 AM    GFRAA >60 06/27/2022 09:00 AM     No results found for: \"HBA1C\", \"[id number]\"  Lab Results   Component Value Date/Time    CHOL 119 03/07/2024 09:43 AM    HDL 52 03/07/2024 09:43 AM    LDL 53.4 03/07/2024 09:43 AM    VLDL 13.6 03/07/2024 09:43 AM     No results found for: \"VITD3\", \"VD3RIA\"    No results found for: \"TSH\", \"TSH2\", \"TSH3\"

## 2024-05-10 SDOH — HEALTH STABILITY: PHYSICAL HEALTH: ON AVERAGE, HOW MANY MINUTES DO YOU ENGAGE IN EXERCISE AT THIS LEVEL?: 120 MIN

## 2024-05-10 SDOH — HEALTH STABILITY: PHYSICAL HEALTH: ON AVERAGE, HOW MANY DAYS PER WEEK DO YOU ENGAGE IN MODERATE TO STRENUOUS EXERCISE (LIKE A BRISK WALK)?: 2 DAYS

## 2024-05-10 ASSESSMENT — PATIENT HEALTH QUESTIONNAIRE - PHQ9
SUM OF ALL RESPONSES TO PHQ QUESTIONS 1-9: 1
SUM OF ALL RESPONSES TO PHQ QUESTIONS 1-9: 1
2. FEELING DOWN, DEPRESSED OR HOPELESS: NOT AT ALL
SUM OF ALL RESPONSES TO PHQ QUESTIONS 1-9: 1
SUM OF ALL RESPONSES TO PHQ9 QUESTIONS 1 & 2: 1
1. LITTLE INTEREST OR PLEASURE IN DOING THINGS: SEVERAL DAYS
SUM OF ALL RESPONSES TO PHQ QUESTIONS 1-9: 1

## 2024-05-10 ASSESSMENT — LIFESTYLE VARIABLES
HOW OFTEN DO YOU HAVE A DRINK CONTAINING ALCOHOL: 1
HOW MANY STANDARD DRINKS CONTAINING ALCOHOL DO YOU HAVE ON A TYPICAL DAY: 0
HOW MANY STANDARD DRINKS CONTAINING ALCOHOL DO YOU HAVE ON A TYPICAL DAY: PATIENT DOES NOT DRINK
HOW OFTEN DO YOU HAVE SIX OR MORE DRINKS ON ONE OCCASION: 1
HOW OFTEN DO YOU HAVE A DRINK CONTAINING ALCOHOL: NEVER

## 2024-05-13 ENCOUNTER — OFFICE VISIT (OUTPATIENT)
Age: 84
End: 2024-05-13
Payer: MEDICARE

## 2024-05-13 VITALS
HEART RATE: 63 BPM | BODY MASS INDEX: 28.85 KG/M2 | RESPIRATION RATE: 16 BRPM | HEIGHT: 69 IN | DIASTOLIC BLOOD PRESSURE: 61 MMHG | SYSTOLIC BLOOD PRESSURE: 114 MMHG | WEIGHT: 194.8 LBS | TEMPERATURE: 97.1 F | OXYGEN SATURATION: 98 %

## 2024-05-13 DIAGNOSIS — E11.9 TYPE 2 DIABETES MELLITUS WITHOUT COMPLICATION, WITHOUT LONG-TERM CURRENT USE OF INSULIN (HCC): ICD-10-CM

## 2024-05-13 DIAGNOSIS — Z00.00 MEDICARE ANNUAL WELLNESS VISIT, SUBSEQUENT: Primary | ICD-10-CM

## 2024-05-13 PROCEDURE — 3051F HG A1C>EQUAL 7.0%<8.0%: CPT | Performed by: NURSE PRACTITIONER

## 2024-05-13 PROCEDURE — 3074F SYST BP LT 130 MM HG: CPT | Performed by: NURSE PRACTITIONER

## 2024-05-13 PROCEDURE — 3078F DIAST BP <80 MM HG: CPT | Performed by: NURSE PRACTITIONER

## 2024-05-13 PROCEDURE — G0439 PPPS, SUBSEQ VISIT: HCPCS | Performed by: NURSE PRACTITIONER

## 2024-05-13 PROCEDURE — 1123F ACP DISCUSS/DSCN MKR DOCD: CPT | Performed by: NURSE PRACTITIONER

## 2024-05-13 NOTE — PROGRESS NOTES
Chief Complaint   Patient presents with    Medicare AWV     Follow up     \"Have you been to the ER, urgent care clinic since your last visit?  Hospitalized since your last visit?\"    NO    “Have you seen or consulted any other health care providers outside of [facility] since your last visit?”    NO          Click Here for Release of Records Request             5/10/2024     8:15 AM   PHQ-9    Little interest or pleasure in doing things 1   Feeling down, depressed, or hopeless 0   PHQ-2 Score 1   PHQ-9 Total Score 1           Financial Resource Strain: Low Risk  (3/4/2024)    Overall Financial Resource Strain (CARDIA)     Difficulty of Paying Living Expenses: Not hard at all      Food Insecurity: Not on file (3/4/2024)          Health Maintenance Due   Topic Date Due    Shingles vaccine (2 of 3) 04/23/2008    Diabetic retinal exam  06/25/2015    Pneumococcal 65+ years Vaccine (2 of 2 - PPSV23 or PCV20) 09/19/2017    Diabetic foot exam  06/27/2023    COVID-19 Vaccine (4 - 2023-24 season) 09/01/2023    Annual Wellness Visit (Medicare)  05/04/2024       
Yes    Interventions:  Audiology referral advised.      Vision Screen:  Do you have difficulty driving, watching TV, or doing any of your daily activities because of your eyesight?: (!) Yes  Have you had an eye exam within the past year?: Yes  No results found.    Interventions:   Patient encouraged to make appointment with their eye specialist    Safety:  Do you have non-slip mats or non-slip surfaces or shower bars or grab bars in your shower or bathtub?: (!) No    Interventions:  Fall and safety precautions reviewed.                   Objective   Vitals:    05/13/24 0901   BP: 114/61   Site: Right Upper Arm   Position: Sitting   Cuff Size: Large Adult   Pulse: 63   Resp: 16   Temp: 97.1 °F (36.2 °C)   TempSrc: Temporal   SpO2: 98%   Weight: 88.4 kg (194 lb 12.8 oz)   Height: 1.753 m (5' 9\")      Body mass index is 28.77 kg/m².      Pulmonary/Chest: clear to auscultation bilaterally- no wheezes, rales or rhonchi, normal air movement, no respiratory distress  Cardiovascular: normal rate and normal S1 and S2  Extremities: no edema   Diabetic foot exam:   Left Foot:   Visual Exam: normal   Pulse DP: 2+ (normal)   Filament test: normal sensation   Vibratory Sensation: normal  Right Foot:   Visual Exam: normal   Pulse DP: 2+ (normal)   Filament test: normal sensation   Vibratory Sensation: normal       Allergies   Allergen Reactions    Moexipril Other (See Comments)    Moexipril-Hydrochlorothiazide Other (See Comments)     GI upset    Olmesartan      Other reaction(s): Unable to Obtain    Penicillins      Other reaction(s): Unable to Obtain    Simvastatin Myalgia     Prior to Visit Medications    Medication Sig Taking? Authorizing Provider   pravastatin (PRAVACHOL) 10 MG tablet TAKE 1 TABLET BY MOUTH EVERY DAY AT NIGHT Yes Conchis Mims,    famotidine (PEPCID) 20 MG tablet Take 1 tablet by mouth at bedtime Yes Tg Fry APRN - NP   clotrimazole-betamethasone (LOTRISONE) 1-0.05 % cream Apply thin layer to 
ambulatory

## 2024-05-22 DIAGNOSIS — K21.9 GASTRO-ESOPHAGEAL REFLUX DISEASE WITHOUT ESOPHAGITIS: ICD-10-CM

## 2024-05-22 DIAGNOSIS — E11.9 TYPE 2 DIABETES MELLITUS WITHOUT COMPLICATIONS (HCC): ICD-10-CM

## 2024-05-22 RX ORDER — LANSOPRAZOLE 30 MG/1
CAPSULE, DELAYED RELEASE ORAL
Qty: 90 CAPSULE | Refills: 1 | Status: SHIPPED | OUTPATIENT
Start: 2024-05-22

## 2024-05-22 RX ORDER — GLIMEPIRIDE 4 MG/1
TABLET ORAL
Qty: 90 TABLET | Refills: 1 | Status: SHIPPED | OUTPATIENT
Start: 2024-05-22

## 2024-06-13 DIAGNOSIS — E66.3 OVERWEIGHT: ICD-10-CM

## 2024-06-13 DIAGNOSIS — I65.09 OCCLUSION AND STENOSIS OF UNSPECIFIED VERTEBRAL ARTERY: ICD-10-CM

## 2024-06-13 RX ORDER — AMLODIPINE AND VALSARTAN 10; 320 MG/1; MG/1
1 TABLET ORAL DAILY
Qty: 90 TABLET | Refills: 1 | Status: SHIPPED | OUTPATIENT
Start: 2024-06-13

## 2024-06-20 ENCOUNTER — TELEPHONE (OUTPATIENT)
Age: 84
End: 2024-06-20

## 2024-06-20 NOTE — TELEPHONE ENCOUNTER
Martha from [facility] called stating that they have sent over a fax for a stop medication order for an upcoming surgery that the patient is having. They are hoping to get a call back from the nurse or someone to find out if we have received this paperwork or not, and when will the possibly have a turn around time.    Best call back number  [phone]

## 2024-06-20 NOTE — TELEPHONE ENCOUNTER
Called Martha from [facility] regarding pre op form for the patient. Informed her that it had been faxed today.

## 2024-07-08 DIAGNOSIS — E11.9 TYPE 2 DIABETES MELLITUS WITHOUT COMPLICATIONS (HCC): ICD-10-CM

## 2024-07-08 RX ORDER — METFORMIN HYDROCHLORIDE 500 MG/1
500 TABLET, EXTENDED RELEASE ORAL 2 TIMES DAILY WITH MEALS
Qty: 180 TABLET | Refills: 1 | Status: SHIPPED | OUTPATIENT
Start: 2024-07-08

## 2024-07-08 RX ORDER — ONDANSETRON 4 MG/1
4 TABLET, ORALLY DISINTEGRATING ORAL EVERY 8 HOURS PRN
Qty: 30 TABLET | Refills: 2 | Status: SHIPPED | OUTPATIENT
Start: 2024-07-08

## 2024-07-08 NOTE — TELEPHONE ENCOUNTER
PCP: Tg Fry APRN - NP      No future appointments.    Requested Prescriptions     Pending Prescriptions Disp Refills    metFORMIN (GLUCOPHAGE-XR) 500 MG extended release tablet [Pharmacy Med Name: METFORMIN HCL  MG TABLET] 180 tablet 1     Sig: TAKE 1 TABLET BY MOUTH TWICE A DAY WITH FOOD       Prior labs and Blood pressures:  BP Readings from Last 3 Encounters:   05/13/24 114/61   03/07/24 (!) 122/56   11/02/23 131/60     Lab Results   Component Value Date/Time     03/07/2024 09:43 AM    K 4.6 03/07/2024 09:43 AM     03/07/2024 09:43 AM    CO2 25 03/07/2024 09:43 AM    BUN 18 03/07/2024 09:43 AM    GFRAA >60 06/27/2022 09:00 AM     No results found for: \"HBA1C\", \"[id number]\"  Lab Results   Component Value Date/Time    CHOL 119 03/07/2024 09:43 AM    HDL 52 03/07/2024 09:43 AM    LDL 53.4 03/07/2024 09:43 AM    VLDL 13.6 03/07/2024 09:43 AM     No results found for: \"VITD3\"    No results found for: \"TSH\", \"TSH2\", \"TSH3\"   Last seen at 05/13/2024

## 2024-07-08 NOTE — TELEPHONE ENCOUNTER
PCP: Tg Fry APRN - NP      No future appointments.    Requested Prescriptions     Pending Prescriptions Disp Refills    ondansetron (ZOFRAN-ODT) 4 MG disintegrating tablet [Pharmacy Med Name: ONDANSETRON ODT 4 MG TABLET] 30 tablet 2     Sig: TAKE 1 TABLET BY MOUTH EVERY 8 HOURS AS NEEDED FOR NAUSEA       Prior labs and Blood pressures:  BP Readings from Last 3 Encounters:   05/13/24 114/61   03/07/24 (!) 122/56   11/02/23 131/60     Lab Results   Component Value Date/Time     03/07/2024 09:43 AM    K 4.6 03/07/2024 09:43 AM     03/07/2024 09:43 AM    CO2 25 03/07/2024 09:43 AM    BUN 18 03/07/2024 09:43 AM    GFRAA >60 06/27/2022 09:00 AM     No results found for: \"HBA1C\", \"GAL1KGFM\"  Lab Results   Component Value Date/Time    CHOL 119 03/07/2024 09:43 AM    HDL 52 03/07/2024 09:43 AM    LDL 53.4 03/07/2024 09:43 AM    VLDL 13.6 03/07/2024 09:43 AM     No results found for: \"VITD3\"    No results found for: \"TSH\", \"TSH2\", \"TSH3\"   Lov 05/13/2024

## 2024-07-25 RX ORDER — CLOPIDOGREL BISULFATE 75 MG/1
TABLET ORAL
Qty: 90 TABLET | Refills: 1 | Status: SHIPPED | OUTPATIENT
Start: 2024-07-25

## 2024-07-25 NOTE — TELEPHONE ENCOUNTER
PCP: Tg Fry APRN - NP      No future appointments.    Requested Prescriptions     Pending Prescriptions Disp Refills    clopidogrel (PLAVIX) 75 MG tablet [Pharmacy Med Name: CLOPIDOGREL 75 MG TABLET] 90 tablet 1     Sig: TAKE 1 TABLET BY MOUTH EVERY DAY       Prior labs and Blood pressures:  BP Readings from Last 3 Encounters:   05/13/24 114/61   03/07/24 (!) 122/56   11/02/23 131/60     Lab Results   Component Value Date/Time     03/07/2024 09:43 AM    K 4.6 03/07/2024 09:43 AM     03/07/2024 09:43 AM    CO2 25 03/07/2024 09:43 AM    BUN 18 03/07/2024 09:43 AM    GFRAA >60 06/27/2022 09:00 AM     No results found for: \"HBA1C\", \"[id number]\"  Lab Results   Component Value Date/Time    CHOL 119 03/07/2024 09:43 AM    HDL 52 03/07/2024 09:43 AM    LDL 53.4 03/07/2024 09:43 AM    VLDL 13.6 03/07/2024 09:43 AM     No results found for: \"VITD3\"    No results found for: \"TSH\", \"TSH2\", \"TSH3\"   LOV 05/13/2024

## 2024-08-19 DIAGNOSIS — K21.9 GASTRO-ESOPHAGEAL REFLUX DISEASE WITHOUT ESOPHAGITIS: ICD-10-CM

## 2024-08-19 RX ORDER — LANSOPRAZOLE 30 MG/1
CAPSULE, DELAYED RELEASE ORAL
Qty: 90 CAPSULE | Refills: 1 | OUTPATIENT
Start: 2024-08-19

## 2024-08-30 DIAGNOSIS — K21.9 GASTROESOPHAGEAL REFLUX DISEASE WITHOUT ESOPHAGITIS: ICD-10-CM

## 2024-08-30 RX ORDER — FAMOTIDINE 20 MG/1
20 TABLET, FILM COATED ORAL
Qty: 90 TABLET | Refills: 1 | Status: SHIPPED | OUTPATIENT
Start: 2024-08-30

## 2024-08-30 NOTE — TELEPHONE ENCOUNTER
PCP: Tg Fry APRN - NP    Last Appt:5/13/2024    No future appointments.    Requested Prescriptions     Pending Prescriptions Disp Refills    famotidine (PEPCID) 20 MG tablet [Pharmacy Med Name: FAMOTIDINE 20 MG TABLET] 90 tablet 1     Sig: TAKE 1 TABLET BY MOUTH EVERYDAY AT BEDTIME       Prior labs and Blood pressures:  BP Readings from Last 3 Encounters:   05/13/24 114/61   03/07/24 (!) 122/56   11/02/23 131/60     Lab Results   Component Value Date/Time     03/07/2024 09:43 AM    K 4.6 03/07/2024 09:43 AM     03/07/2024 09:43 AM    CO2 25 03/07/2024 09:43 AM    BUN 18 03/07/2024 09:43 AM    GFRAA >60 06/27/2022 09:00 AM     No results found for: \"HBA1C\", \"[id number]\"  Lab Results   Component Value Date/Time    CHOL 119 03/07/2024 09:43 AM    HDL 52 03/07/2024 09:43 AM    LDL 53.4 03/07/2024 09:43 AM    VLDL 13.6 03/07/2024 09:43 AM     No results found for: \"VITD3\"    No results found for: \"TSH\", \"TSH2\", \"TSH3\"

## 2024-09-27 NOTE — PATIENT INSTRUCTIONS
Learning About Mild Cognitive Impairment (MCI)  What is mild cognitive impairment (MCI)?     It's common to forget things sometimes as we get older. But some older people have memory loss that's more than normal aging. It's called mild cognitive impairment, or MCI. It is not the same as dementia.  People with the condition often know that their memory or mental function has changed. Tests may show some loss. But their minds work well overall. They can carry out daily tasks that are normal for them.  People with MCI have a higher chance of one day getting dementia. But not all people who have it will get dementia. Some people may stay the same over time.  What are the symptoms?  People with MCI have more memory loss than what occurs with normal aging. They may have increasing trouble with recalling words and keeping up with conversations. They may also have trouble remembering important events and making decisions.  What puts you at risk?  The risk of getting MCI increases with age. Having high blood pressure or having a family history of MCI may also increase your risk.  How is it diagnosed?  Your doctor will do a physical exam.  You may be asked questions to check your memory and other mental skills. Your doctor may also talk to close friends and family members. This can help the doctor figure out how your memory and other mental skills have changed.  You may get blood tests and tests that look at your brain.  These questions and tests can make sure you don't have other conditions that can cause symptoms like MCI. These include depression, sleep problems, and side effects from medicines.  How is it treated?  There are no medicines to treat MCI or to keep it from progressing to dementia. But treating conditions like high blood pressure and diabetes may help. A person with MCI needs routine follow-up visits with their doctor to check on changes in the person's mental skills.  How can you care for yourself at  Patient Education     Sinusitis (Antibiotic Treatment)    The sinuses are air-filled spaces within the bones of the face. They connect to the inside of the nose. Sinusitis is an inflammation of the tissue that lines the sinuses. Sinusitis can occur during a cold. It can also happen due to allergies to pollens and other particles in the air. Sinusitis can cause symptoms of sinus congestion and a feeling of fullness. A sinus infection causes fever, headache, and facial pain. There is often green or yellow fluid draining from the nose or into the back of the throat (post-nasal drip). You have been given antibiotics to treat this condition.   Home care  Take the full course of antibiotics as instructed. Don't stop taking them, even when you feel better.  Drink plenty of water, hot tea, and other liquids as directed by the healthcare provider. This may help thin nasal mucus. It also may help your sinuses drain fluids.  Heat may help soothe painful areas of your face. Use a towel soaked in hot water. Or,  the shower and direct the warm spray onto your face. Using a vaporizer along with a menthol rub at night may also help soothe symptoms.   An expectorant with guaifenesin may help thin nasal mucus and help your sinuses drain fluids. Talk with your provider or pharmacists before taking an over-the-counter (OTC) medicine if you have any questions about it or its side effects..  You can use an OTC decongestant, unless a similar medicine was prescribed to you. Nasal sprays work the fastest. Use one that contains phenylephrine or oxymetazoline. First blow your nose gently. Then use the spray. Don't use these medicines more often than directed on the label. If you do, your symptoms may get worse. You may also take pills that contain pseudoephedrine. Don’t use products that combine multiple medicines. This is because side effects may be increased. Read labels. You can also ask the pharmacist for help. (People with high  blood pressure should not use decongestants. They can raise blood pressure.) Talk with your provider or pharmacist if you have any questions about the medicine..  OTC antihistamines may help if allergies contributed to your sinusitis. Talk with your provider or pharmacist if you have any questions about the medicine..  Don't use nasal rinses or irrigation during an acute sinus infection, unless your healthcare provider tells you to. Rinsing may spread the infection to other areas in your sinuses.  Use acetaminophen or ibuprofen to control pain, unless another pain medicine was prescribed to you. If you have chronic liver or kidney disease or ever had a stomach ulcer, talk with your healthcare provider before using these medicines. Never give aspirin to anyone under age 18 who is ill with a fever. It may cause severe liver damage.  Don't smoke. This can make symptoms worse.    Follow-up care  Follow up with your healthcare provider, or as advised.   When to seek medical advice  Call your healthcare provider if any of these occur:   Facial pain or headache that gets worse  Stiff neck  Unusual drowsiness or confusion  Swelling of your forehead or eyelids  Symptoms don't go away in 10 days  Vision problems, such as blurred or double vision  Fever of 100.4ºF (38ºC) or higher, or as directed by your healthcare provider  Call 911  Call 911 if any of these occur:   Seizure  Trouble breathing  Feeling dizzy or faint  Fingernails, skin or lips look blue, purple , or gray  Prevention  Here are steps you can take to help prevent an infection:   Keep good hand washing habits.  Don’t have close contact with people who have sore throats, colds, or other upper respiratory infections.  Don’t smoke, and stay away from secondhand smoke.  Stay up to date with of your vaccines.  [employer] last reviewed this educational content on 12/1/2019 © 2000-2021 [employer]. All rights reserved. This information is not intended as a  substitute for professional medical care. Always follow your healthcare professional's instructions.

## 2024-10-14 ENCOUNTER — OFFICE VISIT (OUTPATIENT)
Age: 84
End: 2024-10-14
Payer: MEDICARE

## 2024-10-14 ENCOUNTER — ANCILLARY PROCEDURE (OUTPATIENT)
Age: 84
End: 2024-10-14
Payer: MEDICARE

## 2024-10-14 VITALS
HEART RATE: 58 BPM | BODY MASS INDEX: 28.44 KG/M2 | SYSTOLIC BLOOD PRESSURE: 134 MMHG | TEMPERATURE: 97.7 F | RESPIRATION RATE: 16 BRPM | WEIGHT: 192 LBS | DIASTOLIC BLOOD PRESSURE: 60 MMHG | OXYGEN SATURATION: 97 % | HEIGHT: 69 IN

## 2024-10-14 DIAGNOSIS — E78.00 HYPERCHOLESTEROLEMIA: ICD-10-CM

## 2024-10-14 DIAGNOSIS — I65.09 VERTEBRAL ARTERY OCCLUSION, UNSPECIFIED LATERALITY: ICD-10-CM

## 2024-10-14 DIAGNOSIS — E11.9 TYPE 2 DIABETES MELLITUS WITHOUT COMPLICATION, WITHOUT LONG-TERM CURRENT USE OF INSULIN (HCC): ICD-10-CM

## 2024-10-14 DIAGNOSIS — C68.0 PRIMARY URETHRAL PAPILLARY CARCINOMA (HCC): ICD-10-CM

## 2024-10-14 DIAGNOSIS — R10.31 RIGHT GROIN PAIN: ICD-10-CM

## 2024-10-14 DIAGNOSIS — K21.9 GASTROESOPHAGEAL REFLUX DISEASE WITHOUT ESOPHAGITIS: ICD-10-CM

## 2024-10-14 DIAGNOSIS — I10 BENIGN ESSENTIAL HTN: Primary | ICD-10-CM

## 2024-10-14 DIAGNOSIS — M51.362 DEGENERATION OF INTERVERTEBRAL DISC OF LUMBAR REGION WITH DISCOGENIC BACK PAIN AND LOWER EXTREMITY PAIN: ICD-10-CM

## 2024-10-14 DIAGNOSIS — Z23 FLU VACCINE NEED: ICD-10-CM

## 2024-10-14 LAB
ALBUMIN SERPL-MCNC: 3.8 G/DL (ref 3.5–5)
ALBUMIN/GLOB SERPL: 1.5 (ref 1.1–2.2)
ALP SERPL-CCNC: 80 U/L (ref 45–117)
ALT SERPL-CCNC: 23 U/L (ref 12–78)
ANION GAP SERPL CALC-SCNC: 6 MMOL/L (ref 2–12)
AST SERPL-CCNC: 17 U/L (ref 15–37)
BILIRUB SERPL-MCNC: 0.5 MG/DL (ref 0.2–1)
BUN SERPL-MCNC: 16 MG/DL (ref 6–20)
BUN/CREAT SERPL: 15 (ref 12–20)
CALCIUM SERPL-MCNC: 8.8 MG/DL (ref 8.5–10.1)
CHLORIDE SERPL-SCNC: 110 MMOL/L (ref 97–108)
CHOLEST SERPL-MCNC: 108 MG/DL
CO2 SERPL-SCNC: 25 MMOL/L (ref 21–32)
CREAT SERPL-MCNC: 1.04 MG/DL (ref 0.7–1.3)
EST. AVERAGE GLUCOSE BLD GHB EST-MCNC: 148 MG/DL
GLOBULIN SER CALC-MCNC: 2.6 G/DL (ref 2–4)
GLUCOSE SERPL-MCNC: 129 MG/DL (ref 65–100)
HBA1C MFR BLD: 6.8 % (ref 4–5.6)
HDLC SERPL-MCNC: 47 MG/DL
HDLC SERPL: 2.3 (ref 0–5)
LDLC SERPL CALC-MCNC: 42.2 MG/DL (ref 0–100)
POTASSIUM SERPL-SCNC: 4.2 MMOL/L (ref 3.5–5.1)
PROT SERPL-MCNC: 6.4 G/DL (ref 6.4–8.2)
SODIUM SERPL-SCNC: 141 MMOL/L (ref 136–145)
TRIGL SERPL-MCNC: 94 MG/DL
VLDLC SERPL CALC-MCNC: 18.8 MG/DL

## 2024-10-14 PROCEDURE — 1123F ACP DISCUSS/DSCN MKR DOCD: CPT | Performed by: NURSE PRACTITIONER

## 2024-10-14 PROCEDURE — 3075F SYST BP GE 130 - 139MM HG: CPT | Performed by: NURSE PRACTITIONER

## 2024-10-14 PROCEDURE — 3078F DIAST BP <80 MM HG: CPT | Performed by: NURSE PRACTITIONER

## 2024-10-14 PROCEDURE — PBSHW INFLUENZA, FLUAD TRIVALENT, (AGE 65 Y+), IM, PRESERVATIVE FREE, 0.5ML: Performed by: NURSE PRACTITIONER

## 2024-10-14 PROCEDURE — 90653 IIV ADJUVANT VACCINE IM: CPT | Performed by: NURSE PRACTITIONER

## 2024-10-14 PROCEDURE — 73502 X-RAY EXAM HIP UNI 2-3 VIEWS: CPT

## 2024-10-14 PROCEDURE — 3051F HG A1C>EQUAL 7.0%<8.0%: CPT | Performed by: NURSE PRACTITIONER

## 2024-10-14 PROCEDURE — G8427 DOCREV CUR MEDS BY ELIG CLIN: HCPCS | Performed by: NURSE PRACTITIONER

## 2024-10-14 PROCEDURE — 99214 OFFICE O/P EST MOD 30 MIN: CPT | Performed by: NURSE PRACTITIONER

## 2024-10-14 PROCEDURE — G8419 CALC BMI OUT NRM PARAM NOF/U: HCPCS | Performed by: NURSE PRACTITIONER

## 2024-10-14 PROCEDURE — 1036F TOBACCO NON-USER: CPT | Performed by: NURSE PRACTITIONER

## 2024-10-14 PROCEDURE — G8482 FLU IMMUNIZE ORDER/ADMIN: HCPCS | Performed by: NURSE PRACTITIONER

## 2024-10-14 ASSESSMENT — PATIENT HEALTH QUESTIONNAIRE - PHQ9
SUM OF ALL RESPONSES TO PHQ9 QUESTIONS 1 & 2: 0
SUM OF ALL RESPONSES TO PHQ QUESTIONS 1-9: 0
2. FEELING DOWN, DEPRESSED OR HOPELESS: NOT AT ALL
1. LITTLE INTEREST OR PLEASURE IN DOING THINGS: NOT AT ALL

## 2024-10-14 ASSESSMENT — ENCOUNTER SYMPTOMS
CHEST TIGHTNESS: 0
SHORTNESS OF BREATH: 0
BACK PAIN: 1

## 2024-10-14 NOTE — PROGRESS NOTES
Chief Complaint   Patient presents with    Diabetes     Follow up         \"Have you been to the ER, urgent care clinic since your last visit?  Hospitalized since your last visit?\"    NO    “Have you seen or consulted any other health care providers outside of [facility] since your last visit?”    NO          Click Here for Release of Records Request           10/14/2024     8:08 AM   PHQ-9    Little interest or pleasure in doing things 0   Feeling down, depressed, or hopeless 0   PHQ-2 Score 0   PHQ-9 Total Score 0           Financial Resource Strain: Low Risk  (3/4/2024)    Overall Financial Resource Strain (CARDIA)     Difficulty of Paying Living Expenses: Not hard at all      Food Insecurity: Not on file (3/4/2024)          Health Maintenance Due   Topic Date Due    Shingles vaccine (2 of 3) 04/23/2008    Diabetic retinal exam  06/25/2015    Pneumococcal 65+ years Vaccine (2 of 2 - PPSV23 or PCV20) 09/19/2017    Flu vaccine (1) 08/01/2024    COVID-19 Vaccine (4 - 2023-24 season) 09/01/2024    A1C test (Diabetic or Prediabetic)  09/07/2024

## 2024-10-14 NOTE — PROGRESS NOTES
Subjective:      Patient ID: Jeffrey Driver is a 84 y.o. male     HPI  Follow up chronic health problems.  Acute issue.    Hypercholesterolemia. Taking prescribed pravachol.  Following healthy diet, stays active.  HTN.  Adhering to medication regimen.    T2DM. Taking glimeperide and metformin as prescribed.  Reports interim FBS averaging 140's.   History of vertebral artery occlusion on dual antiplatelet therapy.     Recurrent bladder cancer/BPH. Followed by oncology and urologist Dr. Watkins.    GERD.  Under care of Dr. Fagan.  Taking prescribed PPI.   C/o intermittent LBP.  Xray showed multilevel DDD.  Now states pain is more in right groin.  Has done PT with no sustained relief.      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HCC)    BPH with urinary obstruction    Degeneration of intervertebral disc of lumbar region with discogenic back pain and lower extremity pain     Current Outpatient Medications   Medication Sig    famotidine (PEPCID) 20 MG tablet TAKE 1 TABLET BY MOUTH EVERYDAY AT BEDTIME    clopidogrel (PLAVIX) 75 MG tablet TAKE 1 TABLET BY MOUTH EVERY DAY    ondansetron (ZOFRAN-ODT) 4 MG disintegrating tablet TAKE 1 TABLET BY MOUTH EVERY 8 HOURS AS NEEDED FOR NAUSEA    metFORMIN (GLUCOPHAGE-XR) 500 MG extended release tablet TAKE 1 TABLET BY MOUTH TWICE A DAY WITH FOOD    amLODIPine-valsartan (EXFORGE)  MG per tablet TAKE ONE TABLET BY MOUTH ONE TIME DAILY    lansoprazole (PREVACID) 30 MG delayed release capsule TAKE 1 CAP BY MOUTH DAILY (BEFORE BREAKFAST).    glimepiride (AMARYL) 4 MG tablet TAKE 1 TABLET BY MOUTH EVERY DAY IN THE MORNING    pravastatin (PRAVACHOL) 10 MG tablet TAKE 1 TABLET BY MOUTH EVERY

## 2024-10-21 DIAGNOSIS — M16.11 PRIMARY OSTEOARTHRITIS OF RIGHT HIP: Primary | ICD-10-CM

## 2024-10-31 DIAGNOSIS — E78.00 PURE HYPERCHOLESTEROLEMIA, UNSPECIFIED: ICD-10-CM

## 2024-10-31 RX ORDER — PRAVASTATIN SODIUM 10 MG
10 TABLET ORAL NIGHTLY
Qty: 90 TABLET | Refills: 1 | Status: SHIPPED | OUTPATIENT
Start: 2024-10-31

## 2024-10-31 NOTE — TELEPHONE ENCOUNTER
Last appointment: 10/14/24 [place], labs completed 10/2024  Next appointment: None  Previous refill encounter(s): 5/9/24 90 + 1    Requested Prescriptions     Pending Prescriptions Disp Refills    pravastatin (PRAVACHOL) 10 MG tablet 90 tablet 3     Sig: Take 1 tablet by mouth nightly     For Pharmacy Admin Tracking Only    Program: Medication Refill  CPA in place:    Recommendation Provided To:   Intervention Detail: New Rx: 1, reason: Patient Preference  Intervention Accepted By:   Gap Closed?:    Time Spent (min): 5

## 2024-11-13 DIAGNOSIS — E11.9 TYPE 2 DIABETES MELLITUS WITHOUT COMPLICATIONS (HCC): ICD-10-CM

## 2024-11-13 RX ORDER — GLIMEPIRIDE 4 MG/1
TABLET ORAL
Qty: 90 TABLET | Refills: 1 | Status: SHIPPED | OUTPATIENT
Start: 2024-11-13

## 2024-12-06 ENCOUNTER — APPOINTMENT (OUTPATIENT)
Facility: HOSPITAL | Age: 84
DRG: 069 | End: 2024-12-06
Payer: MEDICARE

## 2024-12-06 ENCOUNTER — APPOINTMENT (OUTPATIENT)
Facility: HOSPITAL | Age: 84
DRG: 069 | End: 2024-12-06
Attending: INTERNAL MEDICINE
Payer: MEDICARE

## 2024-12-06 ENCOUNTER — HOSPITAL ENCOUNTER (INPATIENT)
Facility: HOSPITAL | Age: 84
LOS: 2 days | Discharge: HOME | DRG: 069 | End: 2024-12-08
Attending: EMERGENCY MEDICINE | Admitting: INTERNAL MEDICINE
Payer: MEDICARE

## 2024-12-06 DIAGNOSIS — I63.9 ACUTE CVA (CEREBROVASCULAR ACCIDENT) (HCC): ICD-10-CM

## 2024-12-06 DIAGNOSIS — G45.9 TIA (TRANSIENT ISCHEMIC ATTACK): Primary | ICD-10-CM

## 2024-12-06 LAB
ALBUMIN SERPL-MCNC: 3.7 G/DL (ref 3.5–5)
ALBUMIN/GLOB SERPL: 1.2 (ref 1.1–2.2)
ALP SERPL-CCNC: 84 U/L (ref 45–117)
ALT SERPL-CCNC: 26 U/L (ref 12–78)
ANION GAP SERPL CALC-SCNC: 8 MMOL/L (ref 2–12)
AST SERPL-CCNC: 17 U/L (ref 15–37)
BASOPHILS # BLD: 0 K/UL (ref 0–0.1)
BASOPHILS NFR BLD: 0 % (ref 0–1)
BILIRUB SERPL-MCNC: 0.4 MG/DL (ref 0.2–1)
BUN SERPL-MCNC: 19 MG/DL (ref 6–20)
BUN/CREAT SERPL: 19 (ref 12–20)
CALCIUM SERPL-MCNC: 8.6 MG/DL (ref 8.5–10.1)
CHLORIDE SERPL-SCNC: 111 MMOL/L (ref 97–108)
CO2 SERPL-SCNC: 23 MMOL/L (ref 21–32)
COMMENT:: NORMAL
CREAT SERPL-MCNC: 0.99 MG/DL (ref 0.7–1.3)
DIFFERENTIAL METHOD BLD: ABNORMAL
EKG ATRIAL RATE: 75 BPM
EKG DIAGNOSIS: NORMAL
EKG P AXIS: 47 DEGREES
EKG P-R INTERVAL: 206 MS
EKG Q-T INTERVAL: 378 MS
EKG QRS DURATION: 98 MS
EKG QTC CALCULATION (BAZETT): 422 MS
EKG R AXIS: -20 DEGREES
EKG T AXIS: 11 DEGREES
EKG VENTRICULAR RATE: 75 BPM
EOSINOPHIL # BLD: 0.3 K/UL (ref 0–0.4)
EOSINOPHIL NFR BLD: 4 % (ref 0–7)
ERYTHROCYTE [DISTWIDTH] IN BLOOD BY AUTOMATED COUNT: 15.9 % (ref 11.5–14.5)
GLOBULIN SER CALC-MCNC: 3 G/DL (ref 2–4)
GLUCOSE BLD STRIP.AUTO-MCNC: 100 MG/DL (ref 65–117)
GLUCOSE BLD STRIP.AUTO-MCNC: 108 MG/DL (ref 65–117)
GLUCOSE SERPL-MCNC: 200 MG/DL (ref 65–100)
HCT VFR BLD AUTO: 33 % (ref 36.6–50.3)
HGB BLD-MCNC: 10 G/DL (ref 12.1–17)
IMM GRANULOCYTES # BLD AUTO: 0.1 K/UL (ref 0–0.04)
IMM GRANULOCYTES NFR BLD AUTO: 1 % (ref 0–0.5)
INR PPP: 1 (ref 0.9–1.1)
LYMPHOCYTES # BLD: 1.2 K/UL (ref 0.8–3.5)
LYMPHOCYTES NFR BLD: 15 % (ref 12–49)
MCH RBC QN AUTO: 24.8 PG (ref 26–34)
MCHC RBC AUTO-ENTMCNC: 30.3 G/DL (ref 30–36.5)
MCV RBC AUTO: 81.7 FL (ref 80–99)
MONOCYTES # BLD: 0.6 K/UL (ref 0–1)
MONOCYTES NFR BLD: 8 % (ref 5–13)
NEUTS SEG # BLD: 5.8 K/UL (ref 1.8–8)
NEUTS SEG NFR BLD: 72 % (ref 32–75)
NRBC # BLD: 0 K/UL (ref 0–0.01)
NRBC BLD-RTO: 0 PER 100 WBC
PLATELET # BLD AUTO: 275 K/UL (ref 150–400)
PMV BLD AUTO: 10.4 FL (ref 8.9–12.9)
POTASSIUM SERPL-SCNC: 3.8 MMOL/L (ref 3.5–5.1)
PROT SERPL-MCNC: 6.7 G/DL (ref 6.4–8.2)
PROTHROMBIN TIME: 10.7 SEC (ref 9–11.1)
RBC # BLD AUTO: 4.04 M/UL (ref 4.1–5.7)
SERVICE CMNT-IMP: NORMAL
SERVICE CMNT-IMP: NORMAL
SODIUM SERPL-SCNC: 142 MMOL/L (ref 136–145)
SPECIMEN HOLD: NORMAL
TROPONIN I SERPL HS-MCNC: 5 NG/L (ref 0–76)
WBC # BLD AUTO: 8.1 K/UL (ref 4.1–11.1)

## 2024-12-06 PROCEDURE — 4A03X5D MEASUREMENT OF ARTERIAL FLOW, INTRACRANIAL, EXTERNAL APPROACH: ICD-10-PCS | Performed by: STUDENT IN AN ORGANIZED HEALTH CARE EDUCATION/TRAINING PROGRAM

## 2024-12-06 PROCEDURE — 70450 CT HEAD/BRAIN W/O DYE: CPT

## 2024-12-06 PROCEDURE — 36415 COLL VENOUS BLD VENIPUNCTURE: CPT

## 2024-12-06 PROCEDURE — 85610 PROTHROMBIN TIME: CPT

## 2024-12-06 PROCEDURE — 6360000004 HC RX CONTRAST MEDICATION: Performed by: RADIOLOGY

## 2024-12-06 PROCEDURE — 6360000002 HC RX W HCPCS: Performed by: INTERNAL MEDICINE

## 2024-12-06 PROCEDURE — 0042T CT BRAIN PERFUSION: CPT

## 2024-12-06 PROCEDURE — 6370000000 HC RX 637 (ALT 250 FOR IP): Performed by: INTERNAL MEDICINE

## 2024-12-06 PROCEDURE — 82077 ASSAY SPEC XCP UR&BREATH IA: CPT

## 2024-12-06 PROCEDURE — 99285 EMERGENCY DEPT VISIT HI MDM: CPT

## 2024-12-06 PROCEDURE — 71045 X-RAY EXAM CHEST 1 VIEW: CPT

## 2024-12-06 PROCEDURE — 84484 ASSAY OF TROPONIN QUANT: CPT

## 2024-12-06 PROCEDURE — 70498 CT ANGIOGRAPHY NECK: CPT

## 2024-12-06 PROCEDURE — 93005 ELECTROCARDIOGRAM TRACING: CPT | Performed by: EMERGENCY MEDICINE

## 2024-12-06 PROCEDURE — 80053 COMPREHEN METABOLIC PANEL: CPT

## 2024-12-06 PROCEDURE — 85025 COMPLETE CBC W/AUTO DIFF WBC: CPT

## 2024-12-06 PROCEDURE — 2060000000 HC ICU INTERMEDIATE R&B

## 2024-12-06 PROCEDURE — 82962 GLUCOSE BLOOD TEST: CPT

## 2024-12-06 RX ORDER — IOPAMIDOL 755 MG/ML
100 INJECTION, SOLUTION INTRAVASCULAR
Status: COMPLETED | OUTPATIENT
Start: 2024-12-06 | End: 2024-12-06

## 2024-12-06 RX ORDER — ATORVASTATIN CALCIUM 40 MG/1
80 TABLET, FILM COATED ORAL NIGHTLY
Status: DISCONTINUED | OUTPATIENT
Start: 2024-12-06 | End: 2024-12-08 | Stop reason: HOSPADM

## 2024-12-06 RX ORDER — ASPIRIN 81 MG/1
81 TABLET, CHEWABLE ORAL DAILY
Status: DISCONTINUED | OUTPATIENT
Start: 2024-12-06 | End: 2024-12-08 | Stop reason: HOSPADM

## 2024-12-06 RX ORDER — SODIUM CHLORIDE 9 MG/ML
INJECTION, SOLUTION INTRAVENOUS PRN
Status: DISCONTINUED | OUTPATIENT
Start: 2024-12-06 | End: 2024-12-08 | Stop reason: HOSPADM

## 2024-12-06 RX ORDER — ASPIRIN 300 MG/1
300 SUPPOSITORY RECTAL DAILY
Status: DISCONTINUED | OUTPATIENT
Start: 2024-12-06 | End: 2024-12-08 | Stop reason: HOSPADM

## 2024-12-06 RX ORDER — POLYETHYLENE GLYCOL 3350 17 G/17G
17 POWDER, FOR SOLUTION ORAL DAILY PRN
Status: DISCONTINUED | OUTPATIENT
Start: 2024-12-06 | End: 2024-12-08 | Stop reason: HOSPADM

## 2024-12-06 RX ORDER — FINASTERIDE 5 MG/1
5 TABLET, FILM COATED ORAL DAILY
Status: DISCONTINUED | OUTPATIENT
Start: 2024-12-07 | End: 2024-12-08 | Stop reason: HOSPADM

## 2024-12-06 RX ORDER — ENOXAPARIN SODIUM 100 MG/ML
40 INJECTION SUBCUTANEOUS
Status: DISCONTINUED | OUTPATIENT
Start: 2024-12-06 | End: 2024-12-08 | Stop reason: HOSPADM

## 2024-12-06 RX ORDER — INSULIN LISPRO 100 [IU]/ML
0-8 INJECTION, SOLUTION INTRAVENOUS; SUBCUTANEOUS
Status: DISCONTINUED | OUTPATIENT
Start: 2024-12-06 | End: 2024-12-08 | Stop reason: HOSPADM

## 2024-12-06 RX ORDER — SODIUM CHLORIDE 9 MG/ML
INJECTION, SOLUTION INTRAVENOUS CONTINUOUS
Status: DISPENSED | OUTPATIENT
Start: 2024-12-06 | End: 2024-12-07

## 2024-12-06 RX ORDER — ONDANSETRON 4 MG/1
4 TABLET, ORALLY DISINTEGRATING ORAL EVERY 8 HOURS PRN
Status: DISCONTINUED | OUTPATIENT
Start: 2024-12-06 | End: 2024-12-08 | Stop reason: HOSPADM

## 2024-12-06 RX ORDER — SODIUM CHLORIDE 0.9 % (FLUSH) 0.9 %
5-40 SYRINGE (ML) INJECTION PRN
Status: DISCONTINUED | OUTPATIENT
Start: 2024-12-06 | End: 2024-12-08 | Stop reason: HOSPADM

## 2024-12-06 RX ORDER — PANTOPRAZOLE SODIUM 40 MG/1
40 TABLET, DELAYED RELEASE ORAL
Status: DISCONTINUED | OUTPATIENT
Start: 2024-12-07 | End: 2024-12-08 | Stop reason: HOSPADM

## 2024-12-06 RX ORDER — ONDANSETRON 2 MG/ML
4 INJECTION INTRAMUSCULAR; INTRAVENOUS EVERY 6 HOURS PRN
Status: DISCONTINUED | OUTPATIENT
Start: 2024-12-06 | End: 2024-12-08 | Stop reason: HOSPADM

## 2024-12-06 RX ORDER — FAMOTIDINE 20 MG/1
20 TABLET, FILM COATED ORAL
Status: DISCONTINUED | OUTPATIENT
Start: 2024-12-06 | End: 2024-12-06

## 2024-12-06 RX ORDER — DEXTROSE MONOHYDRATE 100 MG/ML
INJECTION, SOLUTION INTRAVENOUS CONTINUOUS PRN
Status: DISCONTINUED | OUTPATIENT
Start: 2024-12-06 | End: 2024-12-08 | Stop reason: HOSPADM

## 2024-12-06 RX ORDER — SODIUM CHLORIDE 0.9 % (FLUSH) 0.9 %
5-40 SYRINGE (ML) INJECTION EVERY 12 HOURS SCHEDULED
Status: DISCONTINUED | OUTPATIENT
Start: 2024-12-06 | End: 2024-12-08 | Stop reason: HOSPADM

## 2024-12-06 RX ORDER — CLOPIDOGREL BISULFATE 75 MG/1
75 TABLET ORAL DAILY
Status: DISCONTINUED | OUTPATIENT
Start: 2024-12-07 | End: 2024-12-08 | Stop reason: HOSPADM

## 2024-12-06 RX ADMIN — ATORVASTATIN CALCIUM 80 MG: 40 TABLET, FILM COATED ORAL at 23:00

## 2024-12-06 RX ADMIN — ENOXAPARIN SODIUM 40 MG: 100 INJECTION SUBCUTANEOUS at 22:59

## 2024-12-06 RX ADMIN — IOPAMIDOL 80 ML: 755 INJECTION, SOLUTION INTRAVENOUS at 12:37

## 2024-12-06 RX ADMIN — ASPIRIN 81 MG CHEWABLE TABLET 81 MG: 81 TABLET CHEWABLE at 22:59

## 2024-12-06 RX ADMIN — IOPAMIDOL 40 ML: 755 INJECTION, SOLUTION INTRAVENOUS at 12:19

## 2024-12-06 ASSESSMENT — PAIN - FUNCTIONAL ASSESSMENT
PAIN_FUNCTIONAL_ASSESSMENT: NONE - DENIES PAIN

## 2024-12-06 ASSESSMENT — PAIN SCALES - GENERAL
PAINLEVEL_OUTOF10: 0

## 2024-12-06 NOTE — H&P
History and Physical    Date of Service:  12/7/2024  Primary Care Provider: Tg Fry APRN - NP  Source of information: The patient on review of EMR    Chief Complaint: Aphasia      History of Presenting Illness:   Jeffrey Driver is a 84 y.o. male with past medical history significant for type 2 diabetes, hypertension, dyslipidemia, degenerative joint disease presented at the emergency room with slurred speech.  Patient symptoms started at about 1030 this morning.  The slurred  speech was noticed while talking on the phone and patient was also having difficulty getting his words out.  He was brought to the emergency room as code stroke level 2 alert.  CT of the head without contrast showed a partially with age-indeterminate infarct along  medial PICA territory but CTA of the head and neck did not show large vessel occlusion.  Patient was seen by teleneurology service at the request of ED physician.  He was subsequently referred to the hospitalist service for admission.         REVIEW OF SYSTEMS:  REVIEW OF SYSTEMS:  HEAD, EYES, EARS, NOSE AND THROAT:  No headache.  No dizziness.  No blurring of vision.  No photophobia.  RESPIRATORY SYSTEM:  No shortness of breath.  No cough.  No hemoptysis.  CARDIOVASCULAR SYSTEM:  No chest pain.  No orthopnea.  No palpitations.  GASTROINTESTINAL SYSTEM:  No nausea or vomiting.  No diarrhea.  No constipation.  GENITOURINARY SYSTEM:  No dysuria, no urgency, and no frequency.     All other systems are reviewed and they are negative.        Past Medical History:   Diagnosis Date    Arthritis     Calculus of kidney     Cerebral artery occlusion with cerebral infarction (HCC) 10/2016    Cerebrovascular disease     Chronic back pain     Diabetes mellitus (HCC)     Elevated cholesterol 5/26/2010    Fracture of right ankle 5/26/2010    GERD (gastroesophageal reflux disease)     HBP (high blood pressure) 5/26/2010    Headache     History of bladder cancer     2020

## 2024-12-06 NOTE — ED PROVIDER NOTES
[facility] EMERGENCY DEP  EMERGENCY DEPARTMENT ENCOUNTER      Pt Name: Jeffrey Driver  MRN: [id number]  Birthdate 1940  Date of evaluation: 12/6/2024  Provider: Rashaad Srivastava MD    CHIEF COMPLAINT       Chief Complaint   Patient presents with    Aphasia         HISTORY OF PRESENT ILLNESS    This is an 84-year-old male with a prior history of stroke in 2016.  He was having extreme difficulty with speech and walking as a result of that stroke but with rehab he has recovered and has no residual.  He states that about 1030 this morning he was talking on the phone to a friend and all of a sudden he started talking gibberish.  The friend called EMS and when they arrived on scene the patient was back to his baseline.  He states that he did have a bad headache along with the difficulty speaking.  He had no peripheral weakness or numbness.  He denies any shortness of breath, chest pain, nausea or vomiting and no visual symptoms.  Patient denies any other acute symptomatology.  Upon his arrival here his symptoms appear to disappeared.            Review of External Medical Records:     Nursing Notes were reviewed.    REVIEW OF SYSTEMS       Review of Systems   Constitutional:  Negative for activity change, chills, fatigue and fever.   HENT:  Negative for congestion, ear pain, rhinorrhea, sinus pressure, sinus pain, sore throat and trouble swallowing.    Eyes: Negative.    Respiratory:  Negative for cough, chest tightness, shortness of breath, wheezing and stridor.    Cardiovascular:  Negative for chest pain, palpitations and leg swelling.   Gastrointestinal:  Negative for abdominal pain, blood in stool, diarrhea, nausea and vomiting.   Endocrine: Negative.    Genitourinary:  Negative for decreased urine volume, difficulty urinating, flank pain, frequency and hematuria.   Musculoskeletal:  Negative for back pain, joint swelling and neck pain.   Skin:  Negative for color change, pallor and rash.   Neurological:  Positive         LABS:  Labs Reviewed   CBC WITH AUTO DIFFERENTIAL   COMPREHENSIVE METABOLIC PANEL   PROTIME-INR   TROPONIN   POCT GLUCOSE       All other labs were within normal range or not returned as of this dictation.    EMERGENCY DEPARTMENT COURSE and DIFFERENTIAL DIAGNOSIS/MDM:   Vitals:    Vitals:    12/06/24 1200   BP: (!) 171/71   Pulse: 72   Resp: 18   Temp: 97.7 °F (36.5 °C)   TempSrc: Temporal   SpO2: 97%           Medical Decision Making  This is an 84-year-old male who presents with a sudden onset of dysarthria and gibberish for speech.  He knew what he was trying to say but was unable to get it out.  He was on the phone talking to a friend when this occurred.  His speech just prior to that had been normal.  He has a history of diabetes and hypertension.  At the time he had the speech difficulty he had a very severe headache.  He is on Plavix currently for some vessel blockage presumably as result of his last stroke.  At this time he has no acute complaint.  Headache is resolved as well.  Physical exam is unremarkable.  A level 2 code stroke is called and negative.  Patient's symptoms have completely resolved.  Will discuss with neurointerventional and teleneurology.  Patient's blood pressures 171/71.    Amount and/or Complexity of Data Reviewed  Labs: ordered.  Radiology: ordered.  ECG/medicine tests: ordered.    Risk  Decision regarding hospitalization.            REASSESSMENT     ED Course as of 12/06/24 1311   Fri Dec 06, 2024   1220 BP(!): 171/71 [RP]      ED Course User Index  [RP] Rashaad Srivastava MD     Patient without any acute findings on CT.  Will admit for further evaluation of suspected TIA.  Consult will be placed with the hospitalist for admission.    Perfect Serve Consult for Admission  1:11 PM    ED Room Number: ER20/20  Patient Name and age:  Jeffrey Driver 84 y.o.  male  Working Diagnosis:   1. TIA (transient ischemic attack)        COVID-19 Suspicion: No  Sepsis present:  No  Reassessment

## 2024-12-06 NOTE — CARE COORDINATION
Care Management Initial Assessment       RUR: 14%  Readmission? No  1st IM letter given? Yes - 24  Verified Insurance: Medicare A and B, [employer]  Emergency Contacts: daughter Anisa Bhatti [phone]  Transportation: daughter      Patient admitted inpatient for acute stroke.    Met with patient in room. His friends Meryl and Gonzalo were in his room. Patient drives himself to MD appointments and his daughter goes with him. Patient said his daughter would drive him to appointments if he is not able to drive after he leaves the hospital. His friends in the room also said they would be willing to transport him.    Residence: Patient confirmed his address. He lives in a 1 level home with 3 steps to enter with rails on both side.   ADL: independent (buttons take him a long time), drives  DME: he has a cane, rolling walker and oxygen from his wife who  in   Pharmacy: [facility]  PCP: Tg Fry  Previous rehab/services:   Transportation: patient drives, his daughter or friends would drive him if needed           24 [id number]   Service Assessment   Patient Orientation Alert and Oriented   Cognition Alert   History Provided By Patient   Primary Caregiver Self   Support Systems Children   Patient's Healthcare Decision Maker is: Legal Next of Kin  (daughter Anisa Bhatti)   PCP Verified by CM Yes  (Dr. Tg Fry)   Last Visit to PCP Within last 3 months   Prior Functional Level Independent in ADLs/IADLs   Can patient return to prior living arrangement Yes   Ability to make needs known: Good   Family able to assist with home care needs: Yes   Would you like for me to discuss the discharge plan with any other family members/significant others, and if so, who? Yes  (laura Bhatti)   Financial Resources Medicare   Social/Functional History   Lives With Alone   Type of Home House   Home Layout One level   Home Access Stairs to enter with rails   Entrance Stairs - Number

## 2024-12-06 NOTE — PROGRESS NOTES
Code Stroke Documentation      Symptoms: Speech difficulty    Baseline mRS:  0   Last Known Well: 1030AM    Medical hx: Past Medical History:   Diagnosis Date    Arthritis     Calculus of kidney     Cerebral artery occlusion with cerebral infarction (HCC) 10/2016    Cerebrovascular disease     Chronic back pain     Diabetes mellitus (HCC)     Elevated cholesterol 2010    Fracture of right ankle 2010    GERD (gastroesophageal reflux disease)     HBP (high blood pressure) 2010    Headache     History of bladder cancer     2020  Resection    Occlusion of left vertebral artery     Primary urethral papillary carcinoma (HCC)     Dr Watkins    Skin cancer of face     Stenosis of right vertebral artery     Ureteral calculus 2010      Vitals: Vitals:    24 1200   BP: (!) 171/71   Pulse: 72   Resp: 18   Temp: 97.7 °F (36.5 °C)   SpO2: 97%      AC/APT: None    VAN: Negative   NIHSS: 1a-LOC:0  1b-Month/Age:0  1c-Open/Close Hand:0  2-Best Gaze:0  3-Visual Fields:0  4-Facial Palsy:0  5a-Left Arm:0  5b-Right Arm:0  6a-Left Le  6b-Right Le  7-Limb Ataxia:0  8-Sensory:0  9-Best Language:0  10-Dysarthria:0  11-Extinction/Inattention:0  TOTAL SCORE:0   Imaging (personally reviewed): CT Head:  Question age-indeterminate infarct along medial right PICA territory versus developing encephalomalacia    CT Angio Head and Neck + CT Perfusion:   No acute no acute vascular abnormality, no large vessel occlusion. No hemodynamically significant stenosis. Normal perfusion.   Plan: tNK Candidate: NO  Mechanical thrombectomy Candidate: NO    *Perform dysphagia screening prior to any PO intake*     Discussed with: ER Physician Dr. Srivastava     Arrival time: 1209    Maria Teresa Epperson APRN - CNP  Neurovascular Nurse Practitioner

## 2024-12-06 NOTE — ED TRIAGE NOTES
LTKW 1030, slurred speech while talking on the phone. Pt states he was having difficulty getting his words out as well. . Symptoms have since resolved. Pt is on plavix.    Code stroke Level2 called

## 2024-12-06 NOTE — ED NOTES
Pt able to stand upright at the bedside and use urinal with this RN standby assisting at the bedside.

## 2024-12-06 NOTE — ED NOTES
TRANSFER - OUT REPORT:    Verbal report given to Ronni Bryant RN on Jeffrey Driver  being transferred to [facility]  for routine progression of patient care       Report consisted of patient's Situation, Background, Assessment and   Recommendations(SBAR).     Information from the following report(s) Nurse Handoff Report, ED Encounter Summary, Cardiac Rhythm (NSR), and Neuro Assessment was reviewed with the receiving nurse.    [place] Fall Assessment:    Presents to emergency department  because of falls (Syncope, seizure, or loss of consciousness): No  Age > 70: Yes  Altered Mental Status, Intoxication with alcohol or substance confusion (Disorientation, impaired judgment, poor safety awaremess, or inability to follow instructions): No  Impaired Mobility: Ambulates or transfers with assistive devices or assistance; Unable to ambulate or transer.: Yes  Nursing Judgement: Yes          Lines:   Peripheral IV 12/06/24 Right Antecubital (Active)   Site Assessment Clean, dry & intact 12/06/24 1243   Line Status Normal saline locked 12/06/24 1243   Phlebitis Assessment No symptoms 12/06/24 1243   Infiltration Assessment 0 12/06/24 1243   Alcohol Cap Used Yes 12/06/24 1243   Dressing Status Clean, dry & intact 12/06/24 1243   Dressing Type Transparent 12/06/24 1243        Opportunity for questions and clarification was provided.      Patient transported with:  Tech

## 2024-12-07 ENCOUNTER — APPOINTMENT (OUTPATIENT)
Facility: HOSPITAL | Age: 84
DRG: 069 | End: 2024-12-07
Payer: MEDICARE

## 2024-12-07 LAB
ALBUMIN SERPL-MCNC: 3.3 G/DL (ref 3.5–5)
ALBUMIN/GLOB SERPL: 1.3 (ref 1.1–2.2)
ALP SERPL-CCNC: 83 U/L (ref 45–117)
ALT SERPL-CCNC: 21 U/L (ref 12–78)
AMMONIA PLAS-SCNC: 26 UMOL/L
AMPHET UR QL SCN: NEGATIVE
ANION GAP SERPL CALC-SCNC: 4 MMOL/L (ref 2–12)
ANION GAP SERPL CALC-SCNC: 6 MMOL/L (ref 2–12)
APAP SERPL-MCNC: 2 UG/ML (ref 10–30)
APPEARANCE UR: CLEAR
ARTERIAL PATENCY WRIST A: ABNORMAL
AST SERPL-CCNC: 12 U/L (ref 15–37)
BACTERIA URNS QL MICRO: NEGATIVE /HPF
BARBITURATES UR QL SCN: NEGATIVE
BASE EXCESS BLD CALC-SCNC: 0.2 MMOL/L
BASOPHILS # BLD: 0 K/UL (ref 0–0.1)
BASOPHILS NFR BLD: 1 % (ref 0–1)
BDY SITE: ABNORMAL
BENZODIAZ UR QL: NEGATIVE
BILIRUB SERPL-MCNC: 0.4 MG/DL (ref 0.2–1)
BILIRUB UR QL: NEGATIVE
BUN SERPL-MCNC: 16 MG/DL (ref 6–20)
BUN SERPL-MCNC: 17 MG/DL (ref 6–20)
BUN/CREAT SERPL: 19 (ref 12–20)
BUN/CREAT SERPL: 20 (ref 12–20)
CALCIUM SERPL-MCNC: 8.2 MG/DL (ref 8.5–10.1)
CALCIUM SERPL-MCNC: 8.7 MG/DL (ref 8.5–10.1)
CANNABINOIDS UR QL SCN: NEGATIVE
CHLORIDE SERPL-SCNC: 113 MMOL/L (ref 97–108)
CHLORIDE SERPL-SCNC: 115 MMOL/L (ref 97–108)
CHOLEST SERPL-MCNC: 113 MG/DL
CO2 SERPL-SCNC: 23 MMOL/L (ref 21–32)
CO2 SERPL-SCNC: 23 MMOL/L (ref 21–32)
COCAINE UR QL SCN: NEGATIVE
COLOR UR: ABNORMAL
CREAT SERPL-MCNC: 0.86 MG/DL (ref 0.7–1.3)
CREAT SERPL-MCNC: 0.86 MG/DL (ref 0.7–1.3)
CRP SERPL-MCNC: <0.29 MG/DL (ref 0–0.3)
DIFFERENTIAL METHOD BLD: ABNORMAL
EOSINOPHIL # BLD: 0.3 K/UL (ref 0–0.4)
EOSINOPHIL NFR BLD: 5 % (ref 0–7)
EPITH CASTS URNS QL MICRO: ABNORMAL /LPF
ERYTHROCYTE [DISTWIDTH] IN BLOOD BY AUTOMATED COUNT: 15.9 % (ref 11.5–14.5)
EST. AVERAGE GLUCOSE BLD GHB EST-MCNC: 171 MG/DL
ETHANOL SERPL-MCNC: <10 MG/DL (ref 0–0.08)
FERRITIN SERPL-MCNC: 10 NG/ML (ref 26–388)
FOLATE SERPL-MCNC: 19.8 NG/ML (ref 5–21)
GAS FLOW.O2 O2 DELIVERY SYS: ABNORMAL
GLOBULIN SER CALC-MCNC: 2.5 G/DL (ref 2–4)
GLUCOSE BLD STRIP.AUTO-MCNC: 142 MG/DL (ref 65–117)
GLUCOSE BLD STRIP.AUTO-MCNC: 162 MG/DL (ref 65–117)
GLUCOSE BLD STRIP.AUTO-MCNC: 197 MG/DL (ref 65–117)
GLUCOSE BLD STRIP.AUTO-MCNC: 91 MG/DL (ref 65–117)
GLUCOSE SERPL-MCNC: 127 MG/DL (ref 65–100)
GLUCOSE SERPL-MCNC: 94 MG/DL (ref 65–100)
GLUCOSE UR STRIP.AUTO-MCNC: 250 MG/DL
HBA1C MFR BLD: 7.6 % (ref 4–5.6)
HCO3 BLD-SCNC: 24.1 MMOL/L (ref 21–28)
HCT VFR BLD AUTO: 30.6 % (ref 36.6–50.3)
HDLC SERPL-MCNC: 42 MG/DL
HDLC SERPL: 2.7 (ref 0–5)
HGB BLD-MCNC: 9.6 G/DL (ref 12.1–17)
HGB UR QL STRIP: ABNORMAL
IMM GRANULOCYTES # BLD AUTO: 0.1 K/UL (ref 0–0.04)
IMM GRANULOCYTES NFR BLD AUTO: 1 % (ref 0–0.5)
IRON SATN MFR SERPL: 8 % (ref 20–50)
IRON SERPL-MCNC: 27 UG/DL (ref 35–150)
KETONES UR QL STRIP.AUTO: NEGATIVE MG/DL
LDLC SERPL CALC-MCNC: 52.6 MG/DL (ref 0–100)
LEUKOCYTE ESTERASE UR QL STRIP.AUTO: NEGATIVE
LYMPHOCYTES # BLD: 1.2 K/UL (ref 0.8–3.5)
LYMPHOCYTES NFR BLD: 19 % (ref 12–49)
Lab: NORMAL
MAGNESIUM SERPL-MCNC: 2.2 MG/DL (ref 1.6–2.4)
MCH RBC QN AUTO: 25.3 PG (ref 26–34)
MCHC RBC AUTO-ENTMCNC: 31.4 G/DL (ref 30–36.5)
MCV RBC AUTO: 80.5 FL (ref 80–99)
METHADONE UR QL: NEGATIVE
MONOCYTES # BLD: 0.5 K/UL (ref 0–1)
MONOCYTES NFR BLD: 7 % (ref 5–13)
NEUTS SEG # BLD: 4.3 K/UL (ref 1.8–8)
NEUTS SEG NFR BLD: 67 % (ref 32–75)
NITRITE UR QL STRIP.AUTO: NEGATIVE
NRBC # BLD: 0 K/UL (ref 0–0.01)
NRBC BLD-RTO: 0 PER 100 WBC
NT PRO BNP: 93 PG/ML
OPIATES UR QL: NEGATIVE
P2Y12 PLT RESPONSE: 190 PRU (ref 194–418)
PCO2 BLD: 35.3 MMHG (ref 35–48)
PCP UR QL: NEGATIVE
PH BLD: 7.44 (ref 7.35–7.45)
PH UR STRIP: 6 (ref 5–8)
PHOSPHATE SERPL-MCNC: 3 MG/DL (ref 2.6–4.7)
PLATELET # BLD AUTO: 248 K/UL (ref 150–400)
PMV BLD AUTO: 9.6 FL (ref 8.9–12.9)
PO2 BLD: 67 MMHG (ref 83–108)
POTASSIUM SERPL-SCNC: 4 MMOL/L (ref 3.5–5.1)
POTASSIUM SERPL-SCNC: 4.1 MMOL/L (ref 3.5–5.1)
PROT SERPL-MCNC: 5.8 G/DL (ref 6.4–8.2)
PROT UR STRIP-MCNC: NEGATIVE MG/DL
RBC # BLD AUTO: 3.8 M/UL (ref 4.1–5.7)
RBC #/AREA URNS HPF: ABNORMAL /HPF (ref 0–5)
SAO2 % BLD: 93.9 % (ref 92–97)
SERVICE CMNT-IMP: ABNORMAL
SERVICE CMNT-IMP: NORMAL
SODIUM SERPL-SCNC: 142 MMOL/L (ref 136–145)
SODIUM SERPL-SCNC: 142 MMOL/L (ref 136–145)
SP GR UR REFRACTOMETRY: 1.02 (ref 1–1.03)
SPECIMEN TYPE: ABNORMAL
TIBC SERPL-MCNC: 327 UG/DL (ref 250–450)
TRIGL SERPL-MCNC: 92 MG/DL
TROPONIN I SERPL HS-MCNC: 7 NG/L (ref 0–76)
TROPONIN I SERPL HS-MCNC: 7 NG/L (ref 0–76)
TSH SERPL DL<=0.05 MIU/L-ACNC: 1.28 UIU/ML (ref 0.36–3.74)
URINE CULTURE IF INDICATED: ABNORMAL
UROBILINOGEN UR QL STRIP.AUTO: 0.2 EU/DL (ref 0.2–1)
VIT B12 SERPL-MCNC: 118 PG/ML (ref 193–986)
VLDLC SERPL CALC-MCNC: 18.4 MG/DL
WBC # BLD AUTO: 6.4 K/UL (ref 4.1–11.1)
WBC URNS QL MICRO: ABNORMAL /HPF (ref 0–4)

## 2024-12-07 PROCEDURE — 86140 C-REACTIVE PROTEIN: CPT

## 2024-12-07 PROCEDURE — 85025 COMPLETE CBC W/AUTO DIFF WBC: CPT

## 2024-12-07 PROCEDURE — 84100 ASSAY OF PHOSPHORUS: CPT

## 2024-12-07 PROCEDURE — 97161 PT EVAL LOW COMPLEX 20 MIN: CPT

## 2024-12-07 PROCEDURE — 82962 GLUCOSE BLOOD TEST: CPT

## 2024-12-07 PROCEDURE — 83540 ASSAY OF IRON: CPT

## 2024-12-07 PROCEDURE — 82607 VITAMIN B-12: CPT

## 2024-12-07 PROCEDURE — 6360000002 HC RX W HCPCS: Performed by: INTERNAL MEDICINE

## 2024-12-07 PROCEDURE — 83880 ASSAY OF NATRIURETIC PEPTIDE: CPT

## 2024-12-07 PROCEDURE — 82728 ASSAY OF FERRITIN: CPT

## 2024-12-07 PROCEDURE — 2060000000 HC ICU INTERMEDIATE R&B

## 2024-12-07 PROCEDURE — 97535 SELF CARE MNGMENT TRAINING: CPT

## 2024-12-07 PROCEDURE — 80143 DRUG ASSAY ACETAMINOPHEN: CPT

## 2024-12-07 PROCEDURE — 84443 ASSAY THYROID STIM HORMONE: CPT

## 2024-12-07 PROCEDURE — 80061 LIPID PANEL: CPT

## 2024-12-07 PROCEDURE — 82746 ASSAY OF FOLIC ACID SERUM: CPT

## 2024-12-07 PROCEDURE — 97116 GAIT TRAINING THERAPY: CPT

## 2024-12-07 PROCEDURE — 83036 HEMOGLOBIN GLYCOSYLATED A1C: CPT

## 2024-12-07 PROCEDURE — 6370000000 HC RX 637 (ALT 250 FOR IP): Performed by: INTERNAL MEDICINE

## 2024-12-07 PROCEDURE — 97165 OT EVAL LOW COMPLEX 30 MIN: CPT

## 2024-12-07 PROCEDURE — 81001 URINALYSIS AUTO W/SCOPE: CPT

## 2024-12-07 PROCEDURE — 6370000000 HC RX 637 (ALT 250 FOR IP): Performed by: HOSPITALIST

## 2024-12-07 PROCEDURE — 82803 BLOOD GASES ANY COMBINATION: CPT

## 2024-12-07 PROCEDURE — 85576 BLOOD PLATELET AGGREGATION: CPT

## 2024-12-07 PROCEDURE — 70551 MRI BRAIN STEM W/O DYE: CPT

## 2024-12-07 PROCEDURE — 83550 IRON BINDING TEST: CPT

## 2024-12-07 PROCEDURE — 36600 WITHDRAWAL OF ARTERIAL BLOOD: CPT

## 2024-12-07 PROCEDURE — 2580000003 HC RX 258: Performed by: INTERNAL MEDICINE

## 2024-12-07 PROCEDURE — 82140 ASSAY OF AMMONIA: CPT

## 2024-12-07 PROCEDURE — 83735 ASSAY OF MAGNESIUM: CPT

## 2024-12-07 PROCEDURE — 80307 DRUG TEST PRSMV CHEM ANLYZR: CPT

## 2024-12-07 RX ORDER — ACETAMINOPHEN 325 MG/1
650 TABLET ORAL EVERY 6 HOURS PRN
Status: DISCONTINUED | OUTPATIENT
Start: 2024-12-07 | End: 2024-12-08 | Stop reason: HOSPADM

## 2024-12-07 RX ADMIN — ATORVASTATIN CALCIUM 80 MG: 40 TABLET, FILM COATED ORAL at 20:19

## 2024-12-07 RX ADMIN — CLOPIDOGREL BISULFATE 75 MG: 75 TABLET ORAL at 08:52

## 2024-12-07 RX ADMIN — SODIUM CHLORIDE, PRESERVATIVE FREE 10 ML: 5 INJECTION INTRAVENOUS at 08:52

## 2024-12-07 RX ADMIN — ASPIRIN 81 MG CHEWABLE TABLET 81 MG: 81 TABLET CHEWABLE at 08:52

## 2024-12-07 RX ADMIN — SODIUM CHLORIDE, PRESERVATIVE FREE 10 ML: 5 INJECTION INTRAVENOUS at 20:20

## 2024-12-07 RX ADMIN — PANTOPRAZOLE SODIUM 40 MG: 40 TABLET, DELAYED RELEASE ORAL at 07:13

## 2024-12-07 RX ADMIN — ACETAMINOPHEN 650 MG: 325 TABLET ORAL at 12:18

## 2024-12-07 RX ADMIN — ONDANSETRON 4 MG: 4 TABLET, ORALLY DISINTEGRATING ORAL at 08:51

## 2024-12-07 RX ADMIN — INSULIN LISPRO 2 UNITS: 100 INJECTION, SOLUTION INTRAVENOUS; SUBCUTANEOUS at 13:29

## 2024-12-07 RX ADMIN — FINASTERIDE 5 MG: 5 TABLET, FILM COATED ORAL at 08:52

## 2024-12-07 RX ADMIN — ENOXAPARIN SODIUM 40 MG: 100 INJECTION SUBCUTANEOUS at 20:19

## 2024-12-07 ASSESSMENT — PAIN SCALES - GENERAL
PAINLEVEL_OUTOF10: 0
PAINLEVEL_OUTOF10: 3
PAINLEVEL_OUTOF10: 3
PAINLEVEL_OUTOF10: 0

## 2024-12-07 ASSESSMENT — PAIN DESCRIPTION - LOCATION: LOCATION: BACK

## 2024-12-07 NOTE — PROGRESS NOTES
Physical Therapy 12/7/2024    Orders received, chart reviewed and patient evaluated by physical therapy. Pending progression with skilled acute physical therapy, recommend:    Intermittent physical therapy up to 2-3x/week in previous living setting    Recommend with nursing OOB to chair 3x/day and walking daily with 1x assist and rolling walker. Thank you for completing as able in order to maintain patient strength, endurance and independence.     Full evaluation to follow.      Thank you,  Amisha Novoa, PT, DPT, NCS

## 2024-12-07 NOTE — PLAN OF CARE
Problem: Occupational Therapy - Adult  Goal: By Discharge: Performs self-care activities at highest level of function for planned discharge setting.  See evaluation for individualized goals.  Description: FUNCTIONAL STATUS PRIOR TO ADMISSION:  Patient was INDP with ADLs, report furniture walking to get around house.      Receives Help From: Family, Prior Level of Assist for ADLs: Independent,  Prior Level of Assist for Homemaking: Independent, Ambulation Assistance: Independent, Prior Level of Assist for Transfers: Independent, Active : Yes         HOME SUPPORT: Patient lived with grandson.     Occupational Therapy Goals  Initiated 12/7/2024   1.  Patient will perform upper body dressing with Starbuck within 7 day(s).  2.  Patient will perform grooming with Starbuck within 7 day(s).  3.  Patient will perform lower body dressing with Starbuck within 7 day(s).  4.  Patient will perform toilet transfers with Starbuck within 7 day(s).  5.  Patient will perform all aspects of toileting with Starbuck within 7 day(s).  6.  Patient will participate in upper extremity therapeutic exercise/activities with Supervision within 7 day(s).    7.  Patient will utilize energy conservation techniques during functional activities with verbal cues within 7 day(s).  8.  Patient will improve their Fugl Garland score by 1-3 points in prep for ADLs within 7 days. (63/66 on eval)  Outcome: Progressing   OCCUPATIONAL THERAPY EVALUATION    Patient: Jeffrey Driver (84 y.o. male)  Date: 12/7/2024  Primary Diagnosis: TIA (transient ischemic attack) [G45.9]  Acute CVA (cerebrovascular accident) ([facility]) [I63.9]         Precautions: Fall Risk                  ASSESSMENT :  The patient is limited by decreased functional mobility, independence in ADLs, high-level IADLs, strength, activity tolerance, endurance, safety awareness, cognition, command following, attention/concentration, impulsivity, coordination, balance , and LUE  (occasional)  Standing: Impaired  Standing - Static: Fair;Occasional  Standing - Dynamic: Fair;Occasional      ADL Assessment:          Feeding: Setup       Grooming: Setup  Grooming Skilled Clinical Factors: completed standing grooming tasks at sink without diffiuclty    UE Bathing: Setup;Based on clinical judgement            LE Bathing: Minimal assistance;Based on clinical judgement       UE Dressing: Minimal assistance  UE Dressing Skilled Clinical Factors: patients daughter providing assistance for pt to doff/don t shirt, difficulty with manaipulating t shirt using LUE    LE Dressing: Minimal assistance  LE Dressing Skilled Clinical Factors: assist to maintain balance whne doffing/donning socks to BLEs    Toileting: Minimal assistance;Based on clinical judgement  Toileting Skilled Clinical Factors: compelted standing urination at toileting with CGA; infer he would req up to min A for seated toileting tasks due to above impairments                      ADL Intervention and task modifications:                                                                                                                                                                                                                                 Fugl-Garland Assessment of Motor Recovery after Stroke:     Reflex Activity  Flexors/Biceps/Fingers: Can be elicited  Extensors/Triceps: Can be elicited  Reflex Subtotal: 4    Volitional Movement Within Synergies  Shoulder Retraction: Full  Shoulder Elevation: Full  Shoulder Abduction (90 degrees): Full  Shoulder External Rotation: Full  Elbow Flexion: Full  Forearm Supination: Full  Shoulder Adduction/Internal Rotation: Full  Elbow Extension: Full  Forearm Pronation: Full  Subtotal: 18    Volitional Movement Mixing Synergies  Hand to Lumbar Spine: Full  Shoulder Flexion (0-90 degrees): Full  Pronation-Supination: Full  Subtotal: 6    Volitional Movement With Little or No Synergy  Shoulder Abduction (0-90    (Mars et al, 2008, n = 14, mean age = 59.9 (14.6) years, assessed on average 14 (6.5) months post stroke, Chronic Stroke)   FMA = 5.2 points for the Upper Extremity portion of the assessment                                                                                                                                                                                                                                 Pain Rating:  Patient did not endorse pain    Activity Tolerance:   Fair  and requires rest breaks    After treatment:   Patient left in no apparent distress sitting up in chair, Call bell within reach, Bed/ chair alarm activated, and Caregiver / family present    COMMUNICATION/EDUCATION:   The patient's plan of care was discussed with: physical therapist, occupational therapist, and registered nurse         Thank you for this referral.  Yuni Booth OT  Minutes: 25    Occupational Therapy Evaluation Charge Determination   History Examination Decision-Making   LOW Complexity : Brief history review  LOW Complexity: 1-3 Performance deficits relating to physical, cognitive, or psychosocial skills that result in activity limitations and/or participation restrictions LOW Complexity: No comorbidities that affect functional and  no verbal  or physical assist needed to complete eval tasks   Based on the above components, the patient evaluation is determined to be of the following complexity level: Low

## 2024-12-07 NOTE — PROGRESS NOTES
[facility]                                                                                          Hospitalist Progress Note  Maryann Escobedo MD  Office Phone: [phone]        Date of Service:  2024  NAME:  Jeffrey Driver  :  1940  MRN:  [id number]       Admission Summary:   84 y.o. male with past medical history significant for type 2 diabetes, hypertension, dyslipidemia, degenerative joint disease presented at the emergency room with slurred speech.  Patient symptoms started at about 1030 this morning.  The slurred  speech was noticed while talking on the phone and patient was also having difficulty getting his words out.  He was brought to the emergency room as code stroke level 2 alert.  CT of the head without contrast showed a partially with age-indeterminate infarct along  medial PICA territory but CTA of the head and neck did not show large vessel occlusion.  Patient was seen by teleneurology service at the request of ED physician.  He was subsequently referred to the hospitalist service for admission.       Interval history / Subjective:   Episode of seizure-like activity and confusion overnight. Currently asymptomatic. Wife at bedside.     Assessment & Plan:     Stroke-like symptoms  Possible seizure  -MRI pending, echo  -EEG  -consult neurology  -continue ASA, statin, Plavix     Type 2 DM  HTN  -resume home meds as indicated     Code status:   Prophylaxis:   Care Plan discussed with:   Anticipated Disposition:   Inpatient  Cardiac monitoring: Telemetry  Central Line:            Social Determinants of Health     Tobacco Use: Medium Risk (11/15/2024)    Patient History     Smoking Tobacco Use: Former     Smokeless Tobacco Use: Never     Passive Exposure: Not on file   Alcohol Use: Not At Risk (5/10/2024)    AUDIT-C     Frequency of Alcohol Consumption: Never     Average Number of Drinks: Patient does not drink     Frequency of Binge Drinking:      Recent Labs     12/06/24  1223 12/06/24  2330 12/07/24  0547    142 142   K 3.8 4.1 4.0   * 113* 115*   CO2 23 23 23   BUN 19 16 17   MG  --   --  2.2   PHOS  --   --  3.0     Recent Labs     12/06/24  1223 12/07/24  0547   ALT 26 21   GLOB 3.0 2.5     Recent Labs     12/06/24  1223   INR 1.0      Recent Labs     12/07/24  0547   TIBC 327      No results found for: \"RBCF\"   No results for input(s): \"PH\", \"PCO2\", \"PO2\" in the last 72 hours.  No results for input(s): \"CPK\" in the last 72 hours.    Invalid input(s): \"CPKMB\", \"CKNDX\", \"TROIQ\"  Lab Results   Component Value Date/Time    CHOL 113 12/07/2024 05:47 AM    HDL 42 12/07/2024 05:47 AM    LDL 52.6 12/07/2024 05:47 AM    LDL 53.4 03/07/2024 09:43 AM     No results found for: \"GLUCPOC\"  [unfilled]    Notes reviewed from all clinical/nonclinical/nursing services involved in patient's clinical care. Care coordination discussions were held with appropriate clinical/nonclinical/ nursing providers based on care coordination needs.         Patients current active Medications were reviewed, considered, added and adjusted based on the clinical condition today.      Home Medications were reconciled to the best of my ability given all available resources at the time of admission. Route is PO if not otherwise noted.      Admission Status:[id number]:::1}      Medications Reviewed:     Current Facility-Administered Medications   Medication Dose Route Frequency    acetaminophen (TYLENOL) tablet 650 mg  650 mg Oral Q6H PRN    clopidogrel (PLAVIX) tablet 75 mg  75 mg Oral Daily    finasteride (PROSCAR) tablet 5 mg  5 mg Oral Daily    pantoprazole (PROTONIX) tablet 40 mg  40 mg Oral QAM AC    sodium chloride flush 0.9 % injection 5-40 mL  5-40 mL IntraVENous 2 times per day    sodium chloride flush 0.9 % injection 5-40 mL  5-40 mL IntraVENous PRN    0.9 % sodium chloride infusion   IntraVENous PRN    ondansetron (ZOFRAN-ODT) disintegrating tablet 4 mg  4 mg Oral Q8H PRN     Or    ondansetron (ZOFRAN) injection 4 mg  4 mg IntraVENous Q6H PRN    polyethylene glycol (GLYCOLAX) packet 17 g  17 g Oral Daily PRN    enoxaparin (LOVENOX) injection 40 mg  40 mg SubCUTAneous QHS    atorvastatin (LIPITOR) tablet 80 mg  80 mg Oral Nightly    aspirin chewable tablet 81 mg  81 mg Oral Daily    Or    aspirin suppository 300 mg  300 mg Rectal Daily    insulin lispro (HUMALOG,ADMELOG) injection vial 0-8 Units  0-8 Units SubCUTAneous 4x Daily AC & HS    glucose chewable tablet 16 g  4 tablet Oral PRN    dextrose bolus 10% 125 mL  125 mL IntraVENous PRN    Or    dextrose bolus 10% 250 mL  250 mL IntraVENous PRN    glucagon injection 1 mg  1 mg SubCUTAneous PRN    dextrose 10 % infusion   IntraVENous Continuous PRN     ______________________________________________________________________  EXPECTED LENGTH OF STAY: 3  ACTUAL LENGTH OF STAY:          1                 Maryann Escobedo MD

## 2024-12-07 NOTE — CONSULTS
Neurology Consult      Patient: Jeffrey Driver MRN: [id number]  SSN: [id number]    YOB: 1940  Age: 84 y.o.  Sex: male      HPI      Jeffrey Driver is a 84 year old male w/pmh of headache, left vertebral artery occlusion, right middle cerebral artery/right cerebellar stroke, GERD, type 2 diabetes, hyperlipidemia, hypertension, DJD who presented on 12/6 as a stroke code with an episode of dysarthria/aphasia.     Per the patient, he was talking on the phone when he could no longer get his words out, when he did attempt to talk, his speech was very garbled and non comprehensible. He was instructed by the other person on the phone to sent down and wait for EMS. He opened the back door for EMS. He then states that he woke up to find EMS standing over him. It is unclear how long the patients symptoms lasted, he can not give an exact duration. Per chart review, LKW 1030AM and sx were resolved upon arriving at the hospital by 1200.     CT Head showed no acute pathology. CT Angio Head and Neck with no LVO, chronic left vertebral artery occlusion, CT Perfusion negative. He was not a tNK candidate.     Neurology consulted for stroke work up.     Yesterday evening after being admitted, he was noted to have an episode by nursing that involved shaking of the right arm/other extremities, increased respirations and confusion in between the shaking. Repeat CT Head obtained that was negative, routine EEG ordered.     Past Medical History:   Diagnosis Date    Arthritis     Calculus of kidney     Cerebral artery occlusion with cerebral infarction (HCC) 10/2016    Cerebrovascular disease     Chronic back pain     Diabetes mellitus (HCC)     Elevated cholesterol 5/26/2010    Fracture of right ankle 5/26/2010    GERD (gastroesophageal reflux disease)     HBP (high blood pressure) 5/26/2010    Headache     History of bladder cancer     2020  Resection    Occlusion of left vertebral artery 2016    Primary urethral papillary  carcinoma (HCC) 2014    Dr Watkins    Skin cancer of face     Stenosis of right vertebral artery     Ureteral calculus 2010     No family history on file.  Social History     Tobacco Use    Smoking status: Former     Current packs/day: 0.00     Types: Cigarettes     Quit date: 1992     Years since quittin.5    Smokeless tobacco: Never   Substance Use Topics    Alcohol use: Not Currently     Alcohol/week: 6.0 standard drinks of alcohol     Types: 6 Cans of beer per week      Prior to Admission Medications   Prescriptions Last Dose Informant Patient Reported? Taking?   Probiotic Product (ACIDOPHILUS PROBIOTIC) CAPS capsule   Yes No   Sig: Take by mouth   amLODIPine-valsartan (EXFORGE)  MG per tablet   No No   Sig: TAKE ONE TABLET BY MOUTH ONE TIME DAILY   aspirin 81 MG EC tablet   Yes No   Sig: Take by mouth daily   blood glucose test strips (EXACTECH TEST) strip   Yes No   Sig: TEST DAILY AS DIRECTED *E11.9*   clopidogrel (PLAVIX) 75 MG tablet   No No   Sig: TAKE 1 TABLET BY MOUTH EVERY DAY   diclofenac sodium (VOLTAREN) 1 % GEL   Yes No   Sig: Apply topically 4 times daily   famotidine (PEPCID) 20 MG tablet   No No   Sig: TAKE 1 TABLET BY MOUTH EVERYDAY AT BEDTIME   finasteride (PROSCAR) 5 MG tablet   Yes No   Sig: TAKE 1 TABLET BY MOUTH EVERY DAY   glimepiride (AMARYL) 4 MG tablet   No No   Sig: TAKE 1 TABLET BY MOUTH EVERY DAY IN THE MORNING   lansoprazole (PREVACID) 30 MG delayed release capsule   No No   Sig: TAKE 1 CAP BY MOUTH DAILY (BEFORE BREAKFAST).   metFORMIN (GLUCOPHAGE-XR) 500 MG extended release tablet   No No   Sig: TAKE 1 TABLET BY MOUTH TWICE A DAY WITH FOOD   ondansetron (ZOFRAN-ODT) 4 MG disintegrating tablet   No No   Sig: TAKE 1 TABLET BY MOUTH EVERY 8 HOURS AS NEEDED FOR NAUSEA   pravastatin (PRAVACHOL) 10 MG tablet   No No   Sig: Take 1 tablet by mouth nightly   vitamin D (CHOLECALCIFEROL) 25 MCG (1000 UT) TABS tablet   Yes No   Sig: Take by mouth daily       Facility-Administered Medications: None       Allergies   Allergen Reactions    Moexipril Other (See Comments)    Moexipril-Hydrochlorothiazide Other (See Comments)     GI upset    Olmesartan      Other reaction(s): Unable to Obtain    Penicillins      Other reaction(s): Unable to Obtain    Simvastatin Myalgia       Review of Systems:  Negative except for HPI    Objective:     Vitals:    12/07/24 0641 12/07/24 0800 12/07/24 1006 12/07/24 1045   BP: 123/64 (!) 126/48  (!) 133/46   Pulse: 62 57 67 81   Resp: 16 17  22   Temp: 97.4 °F (36.3 °C) 97.7 °F (36.5 °C)  97.6 °F (36.4 °C)   TempSrc: Oral Oral  Oral   SpO2: 94% 93%  93%   Weight:       Height:          Physical Exam:  GENERAL: Calm, cooperative, NAD  SKIN: Warm, dry, color appropriate for ethnicity.     Neurologic Exam:  Mental Status:  Alert and oriented x 4.  Appropriate affect, mood and behavior.       Language:    Normal fluency, repetition, comprehension and naming.    Cranial Nerves:   Pupils 3 mm, equal, round and reactive to light.     Visual fields full to confrontation.     Extraocular movements intact.        Facial sensation intact.     Right facial droop      Hearing grossly intact bilaterally.     No dysarthria. Tongue protrudes to midline, palate elevates symmetrically.      Shoulder shrug 5/5 bilaterally.       Motor:    No pronator drift.      Bulk and tone normal.      5/5 power in all extremities proximally and distally.     No involuntary movements.    Sensation:    Sensation intact throughout to light touch    Coordination & Gait: Deferred    NIHSS 1 for right facial droop     Labs:  Lab Results   Component Value Date/Time    WBC 6.4 12/07/2024 05:47 AM    HGB 9.6 12/07/2024 05:47 AM    HCT 30.6 12/07/2024 05:47 AM     12/07/2024 05:47 AM    MCV 80.5 12/07/2024 05:47 AM      Lab Results   Component Value Date/Time     12/07/2024 05:47 AM    K 4.0 12/07/2024 05:47 AM     12/07/2024 05:47 AM    CO2 23 12/07/2024 05:47 AM

## 2024-12-07 NOTE — PLAN OF CARE
Problem: Physical Therapy - Adult  Goal: By Discharge: Performs mobility at highest level of function for planned discharge setting.  See evaluation for individualized goals.  Description: FUNCTIONAL STATUS PRIOR TO ADMISSION: Patient was independent without DME.    HOME SUPPORT PRIOR TO ADMISSION: The patient lived with his grandson.    Physical Therapy Goals  Initiated 12/7/2024  1.  Patient will move from supine to sit and sit to supine and roll side to side in bed with modified independence within 7 day(s).    2.  Patient will perform sit to stand with modified independence within 7 day(s).  3.  Patient will transfer from bed to chair and chair to bed with modified independence using the least restrictive device within 7 day(s).  4.  Patient will ambulate with modified independence for 200 feet with the least restrictive device within 7 day(s).   5.  Patient will ascend/descend 3 stairs with handrail(s) with modified independence within 7 day(s).  6.  Patient will improve Garnett Balance score by 7 points within 7 days.   Outcome: Progressing   PHYSICAL THERAPY EVALUATION    Patient: Jeffrey Driver (84 y.o. male)  Date: 12/7/2024  Primary Diagnosis: TIA (transient ischemic attack) [G45.9]  Acute CVA (cerebrovascular accident) ([facility]) [I63.9]       Precautions: Restrictions/Precautions: Fall Risk                      ASSESSMENT :   DEFICITS/IMPAIRMENTS:   The patient is limited by decreased functional mobility, ROM, strength, body mechanics, activity tolerance, safety awareness, cognition, command following, attention/concentration, coordination, balance s/p admission for changes in his speech. CT showed \"question age-indeterminate infarct along medial right PICA territory versus developing encephalomalacia\" and MRI pending at the time of eval. Prior to admission patient reports being independent with mobility without devices.    Patient was received in bed, agreeable to mobility. He completed bed mobility with

## 2024-12-08 ENCOUNTER — APPOINTMENT (OUTPATIENT)
Facility: HOSPITAL | Age: 84
DRG: 069 | End: 2024-12-08
Attending: INTERNAL MEDICINE
Payer: MEDICARE

## 2024-12-08 VITALS
BODY MASS INDEX: 29.76 KG/M2 | TEMPERATURE: 97.9 F | HEART RATE: 67 BPM | RESPIRATION RATE: 18 BRPM | DIASTOLIC BLOOD PRESSURE: 80 MMHG | HEIGHT: 70 IN | SYSTOLIC BLOOD PRESSURE: 142 MMHG | WEIGHT: 207.89 LBS | OXYGEN SATURATION: 95 %

## 2024-12-08 LAB
ANION GAP SERPL CALC-SCNC: 5 MMOL/L (ref 2–12)
BASOPHILS # BLD: 0 K/UL (ref 0–0.1)
BASOPHILS NFR BLD: 0 % (ref 0–1)
BUN SERPL-MCNC: 18 MG/DL (ref 6–20)
BUN/CREAT SERPL: 17 (ref 12–20)
CALCIUM SERPL-MCNC: 8.4 MG/DL (ref 8.5–10.1)
CHLORIDE SERPL-SCNC: 112 MMOL/L (ref 97–108)
CO2 SERPL-SCNC: 23 MMOL/L (ref 21–32)
CREAT SERPL-MCNC: 1.05 MG/DL (ref 0.7–1.3)
DIFFERENTIAL METHOD BLD: ABNORMAL
ECHO AV AREA PEAK VELOCITY: 1.6 CM2
ECHO AV AREA VTI: 1.8 CM2
ECHO AV AREA/BSA PEAK VELOCITY: 0.8 CM2/M2
ECHO AV AREA/BSA VTI: 0.8 CM2/M2
ECHO AV MEAN GRADIENT: 12 MMHG
ECHO AV MEAN VELOCITY: 1.6 M/S
ECHO AV PEAK GRADIENT: 25 MMHG
ECHO AV PEAK VELOCITY: 2.5 M/S
ECHO AV VELOCITY RATIO: 0.48
ECHO AV VTI: 53.1 CM
ECHO BSA: 2.09 M2
ECHO EST RA PRESSURE: 3 MMHG
ECHO LV EF PHYSICIAN: 60 %
ECHO LV FRACTIONAL SHORTENING: 34 % (ref 28–44)
ECHO LV INTERNAL DIMENSION DIASTOLE INDEX: 2.5 CM/M2
ECHO LV INTERNAL DIMENSION DIASTOLIC: 5.3 CM (ref 4.2–5.9)
ECHO LV INTERNAL DIMENSION SYSTOLIC INDEX: 1.65 CM/M2
ECHO LV INTERNAL DIMENSION SYSTOLIC: 3.5 CM
ECHO LV IVSD: 1.1 CM (ref 0.6–1)
ECHO LV MASS 2D: 227.7 G (ref 88–224)
ECHO LV MASS INDEX 2D: 107.4 G/M2 (ref 49–115)
ECHO LV POSTERIOR WALL DIASTOLIC: 1.1 CM (ref 0.6–1)
ECHO LV RELATIVE WALL THICKNESS RATIO: 0.42
ECHO LVOT AREA: 3.5 CM2
ECHO LVOT AV VTI INDEX: 0.52
ECHO LVOT DIAM: 2.1 CM
ECHO LVOT MEAN GRADIENT: 3 MMHG
ECHO LVOT PEAK GRADIENT: 5 MMHG
ECHO LVOT PEAK VELOCITY: 1.2 M/S
ECHO LVOT STROKE VOLUME INDEX: 45.1 ML/M2
ECHO LVOT SV: 95.5 ML
ECHO LVOT VTI: 27.6 CM
ECHO MV A VELOCITY: 0.84 M/S
ECHO MV AREA PHT: 2.6 CM2
ECHO MV AREA VTI: 2.9 CM2
ECHO MV E DECELERATION TIME (DT): 286.6 MS
ECHO MV E VELOCITY: 0.66 M/S
ECHO MV E/A RATIO: 0.79
ECHO MV LVOT VTI INDEX: 1.18
ECHO MV MAX VELOCITY: 1.1 M/S
ECHO MV MEAN GRADIENT: 2 MMHG
ECHO MV MEAN VELOCITY: 0.6 M/S
ECHO MV PEAK GRADIENT: 4 MMHG
ECHO MV PRESSURE HALF TIME (PHT): 83.1 MS
ECHO MV REGURGITANT PEAK GRADIENT: 104 MMHG
ECHO MV REGURGITANT PEAK VELOCITY: 5.1 M/S
ECHO MV VTI: 32.6 CM
ECHO PV MAX VELOCITY: 0.7 M/S
ECHO PV PEAK GRADIENT: 2 MMHG
ECHO RIGHT VENTRICULAR SYSTOLIC PRESSURE (RVSP): 12 MMHG
ECHO TV REGURGITANT MAX VELOCITY: 1.51 M/S
ECHO TV REGURGITANT PEAK GRADIENT: 9 MMHG
EOSINOPHIL # BLD: 0.4 K/UL (ref 0–0.4)
EOSINOPHIL NFR BLD: 7 % (ref 0–7)
ERYTHROCYTE [DISTWIDTH] IN BLOOD BY AUTOMATED COUNT: 15.9 % (ref 11.5–14.5)
GLUCOSE BLD STRIP.AUTO-MCNC: 154 MG/DL (ref 65–117)
GLUCOSE BLD STRIP.AUTO-MCNC: 175 MG/DL (ref 65–117)
GLUCOSE BLD STRIP.AUTO-MCNC: 187 MG/DL (ref 65–117)
GLUCOSE SERPL-MCNC: 151 MG/DL (ref 65–100)
HCT VFR BLD AUTO: 30.4 % (ref 36.6–50.3)
HGB BLD-MCNC: 9.3 G/DL (ref 12.1–17)
IMM GRANULOCYTES # BLD AUTO: 0 K/UL (ref 0–0.04)
IMM GRANULOCYTES NFR BLD AUTO: 1 % (ref 0–0.5)
LYMPHOCYTES # BLD: 1.6 K/UL (ref 0.8–3.5)
LYMPHOCYTES NFR BLD: 24 % (ref 12–49)
MAGNESIUM SERPL-MCNC: 2.1 MG/DL (ref 1.6–2.4)
MCH RBC QN AUTO: 24.9 PG (ref 26–34)
MCHC RBC AUTO-ENTMCNC: 30.6 G/DL (ref 30–36.5)
MCV RBC AUTO: 81.3 FL (ref 80–99)
MONOCYTES # BLD: 0.5 K/UL (ref 0–1)
MONOCYTES NFR BLD: 8 % (ref 5–13)
NEUTS SEG # BLD: 4.1 K/UL (ref 1.8–8)
NEUTS SEG NFR BLD: 60 % (ref 32–75)
NRBC # BLD: 0 K/UL (ref 0–0.01)
NRBC BLD-RTO: 0 PER 100 WBC
PLATELET # BLD AUTO: 232 K/UL (ref 150–400)
PMV BLD AUTO: 10.2 FL (ref 8.9–12.9)
POTASSIUM SERPL-SCNC: 4 MMOL/L (ref 3.5–5.1)
RBC # BLD AUTO: 3.74 M/UL (ref 4.1–5.7)
SERVICE CMNT-IMP: ABNORMAL
SODIUM SERPL-SCNC: 140 MMOL/L (ref 136–145)
WBC # BLD AUTO: 6.7 K/UL (ref 4.1–11.1)

## 2024-12-08 PROCEDURE — 6370000000 HC RX 637 (ALT 250 FOR IP): Performed by: INTERNAL MEDICINE

## 2024-12-08 PROCEDURE — 83735 ASSAY OF MAGNESIUM: CPT

## 2024-12-08 PROCEDURE — 36415 COLL VENOUS BLD VENIPUNCTURE: CPT

## 2024-12-08 PROCEDURE — 2580000003 HC RX 258: Performed by: INTERNAL MEDICINE

## 2024-12-08 PROCEDURE — 93308 TTE F-UP OR LMTD: CPT

## 2024-12-08 PROCEDURE — 82962 GLUCOSE BLOOD TEST: CPT

## 2024-12-08 PROCEDURE — 80048 BASIC METABOLIC PNL TOTAL CA: CPT

## 2024-12-08 PROCEDURE — 85025 COMPLETE CBC W/AUTO DIFF WBC: CPT

## 2024-12-08 RX ADMIN — INSULIN LISPRO 2 UNITS: 100 INJECTION, SOLUTION INTRAVENOUS; SUBCUTANEOUS at 12:50

## 2024-12-08 RX ADMIN — FINASTERIDE 5 MG: 5 TABLET, FILM COATED ORAL at 09:34

## 2024-12-08 RX ADMIN — PANTOPRAZOLE SODIUM 40 MG: 40 TABLET, DELAYED RELEASE ORAL at 07:19

## 2024-12-08 RX ADMIN — ASPIRIN 81 MG CHEWABLE TABLET 81 MG: 81 TABLET CHEWABLE at 09:34

## 2024-12-08 RX ADMIN — SODIUM CHLORIDE, PRESERVATIVE FREE 10 ML: 5 INJECTION INTRAVENOUS at 09:36

## 2024-12-08 RX ADMIN — CLOPIDOGREL BISULFATE 75 MG: 75 TABLET ORAL at 09:34

## 2024-12-08 ASSESSMENT — PAIN SCALES - GENERAL
PAINLEVEL_OUTOF10: 0
PAINLEVEL_OUTOF10: 0

## 2024-12-08 NOTE — DISCHARGE SUMMARY
Discharge Summary       PATIENT ID: Jeffrey Driver  MRN: [id number]   YOB: 1940    DATE OF ADMISSION: 12/6/2024 12:16 PM    DATE OF DISCHARGE: 12/8/2024   PRIMARY CARE PROVIDER: Tg Fry APRN - NP     ATTENDING PHYSICIAN: Gregg  DISCHARGING PROVIDER: Maryann Escobedo MD    To contact this individual call [phone] and ask the  to page.  If unavailable ask to be transferred the Adult Hospitalist Department.    CONSULTATIONS: IP CONSULT TO TELE-NEUROLOGY  IP CONSULT TO NEUROLOGY  IP CONSULT TO CASE MANAGEMENT  IP CONSULT TO STROKE COORDINATOR    PROCEDURES/SURGERIES: * No surgery found *     ADMITTING DIAGNOSES & HOSPITAL COURSE:   Stroke-like symptoms  Type 2 DM  HTN        84 y.o. male with past medical history significant for type 2 diabetes, hypertension, dyslipidemia, degenerative joint disease presented at the emergency room with slurred speech. The slurred  speech was noticed while talking on the phone and patient was also having difficulty getting his words out.  He was brought to the emergency room as code stroke level 2 alert.  CT of the head without contrast showed a partially with age-indeterminate infarct along  medial PICA territory but CTA of the head and neck did not show large vessel occlusion. Shortly after admission he had an episode of confusion and seizure-like activity.    -MRI negative for acute CVA  -LDL 52 on current statin, PFA therapeutic on Plavix  -d/w neuro, ok to dc without EEG as pt adamant on leaving. We were ultimately able to get it right before discharge. Report pending.  -echo not done; pt adamant on discharge today, advised to get it outpatient at least    Has been asymptomatic and requesting discharge today.    DISCHARGE DIAGNOSES / PLAN:      PENDING TEST RESULTS:   At the time of discharge the following test results are still pending: EEG read    FOLLOW UP APPOINTMENTS:    Follow-up Information       Follow up With Specialties Details  DIAGNOSES:  Principal Problem:    Acute CVA (cerebrovascular accident) (HCC)  Resolved Problems:    * No resolved hospital problems. *        Greater than 31 minutes were spent with the patient on counseling and coordination of care    Signed:   Maryann Escobedo MD  12/8/2024  3:47 PM

## 2024-12-08 NOTE — PROGRESS NOTES
SLP Contact Note    Consult received and appreciated. Pt chart reviewed. Although pt came in for concerns for aphasia and dysarthria, MRI negative for acute infarct. There are concerns for a seizure and therefore these symptoms could be related to a post-ictal state. However, symptoms have now reportedly resolved. Pt passed swallow screen. NIH 0. No history of dysphagia. No SLP needs. Will sign off.  Please reconsult should SLP be of any assistance.      Thank you,  Eleni Beckham M.Ed, CCC-SLP  Speech-Language Pathologist

## 2024-12-09 PROBLEM — R56.9 SEIZURE-LIKE ACTIVITY (HCC): Status: ACTIVE | Noted: 2024-12-09

## 2024-12-09 PROCEDURE — 95816 EEG AWAKE AND DROWSY: CPT | Performed by: PSYCHIATRY & NEUROLOGY

## 2024-12-09 NOTE — PROGRESS NOTES
Patient was D/C 12/8 @ 1718 - 12/9 @ 10:23 Went into patients chart after D/C to get phone numbers to call patient or family to see if they can  belongings that where left in room 659. A blanket (Tan,White & brown strip).   Spoke with Anisa Bhatti @ [phone] - She will  tomorrow 12/10

## 2024-12-10 NOTE — PROCEDURES
[place] [id number]                                   EEG      PATIENT NAME: ALLEN SHER              : 1940  MED REC NO: [id number]                       ROOM:   ACCOUNT NO: [id number]                       ADMIT DATE: 2024  PROVIDER: Jose Enrique Gant MD    DATE OF SERVICE:  2024    REFERRING PHYSICIAN:  BASIA WRIGHT    HISTORY:  The patient is an 84-year-old male who is being evaluated after an episode of slurred speech and seizure-like activity/confusion that lasted overnight.    DESCRIPTION:  This is an 18-channel EEG performed on an awake and drowsy patient.  During wakefulness, the dominant posterior background rhythm consists of symmetric, well-modulated, medium voltage rhythms in the 8-9 hertz frequency range.  Faster frequency is seen in the frontal areas.  When the patient was clinically noted to be asleep, slowing was seen in the central areas.  Photic stimulation and hyperventilation was not performed.    EEG SUMMARY:  Normal study.    CLINICAL INTERPRETATION:  This is a normal EEG during awake and drowsy states of the patient.  No lateralizing or epileptiform features were noted.        JOSE ENRIQUE GANT MD      ASS/AQS  D:  2024 22:29:59  T:  2024 22:42:23  JOB #:  [id number]

## 2024-12-12 ENCOUNTER — OFFICE VISIT (OUTPATIENT)
Age: 84
End: 2024-12-12

## 2024-12-12 VITALS
HEIGHT: 70 IN | BODY MASS INDEX: 28.2 KG/M2 | DIASTOLIC BLOOD PRESSURE: 58 MMHG | OXYGEN SATURATION: 97 % | TEMPERATURE: 98.4 F | HEART RATE: 74 BPM | WEIGHT: 197 LBS | RESPIRATION RATE: 16 BRPM | SYSTOLIC BLOOD PRESSURE: 126 MMHG

## 2024-12-12 DIAGNOSIS — E11.9 TYPE 2 DIABETES MELLITUS WITHOUT COMPLICATION, WITHOUT LONG-TERM CURRENT USE OF INSULIN (HCC): ICD-10-CM

## 2024-12-12 DIAGNOSIS — I65.09 VERTEBRAL ARTERY OCCLUSION, UNSPECIFIED LATERALITY: ICD-10-CM

## 2024-12-12 DIAGNOSIS — R29.90 STROKE-LIKE SYMPTOMS: Primary | ICD-10-CM

## 2024-12-12 PROBLEM — R56.9 SEIZURE-LIKE ACTIVITY (HCC): Status: RESOLVED | Noted: 2024-12-09 | Resolved: 2024-12-12

## 2024-12-12 ASSESSMENT — ENCOUNTER SYMPTOMS
SHORTNESS OF BREATH: 0
CHEST TIGHTNESS: 0

## 2024-12-12 NOTE — PROGRESS NOTES
Subjective:      Patient ID: Jeffrey Driver is a 84 y.o. male     HPI  Follow up hospitalization.  Records reviewed and summarized as follows: Taken by EMS to [facility] with slurred speech.  Was speaking to a friend on the phone when his speech began to slur.  PMH significant for vertebral artery occlusion on plavix and ASA.    CT head showed age indeterminate infarct.  CTA head, neck with no occlusion.  MRI negative for acute infarct.   Had some seizure like activity while hospitalized, EEG with no acute abnormality.    Feeling well since discharge.  No recurrence of symptoms.  Has follow up scheduled with ALEXUS Altamirano on 1/21/25.    Reports blood sugars were high in hospital.  PMH T2DM.  Taking prescribed meds.  A1C mildly elevated at 7.6, increased from 6.8 at last OV 2 months ago.  Admits to not being consistent with diabetic diet.      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[facility])    BPH with urinary obstruction    Degeneration of intervertebral disc of lumbar region with discogenic back pain and lower extremity pain    Acute CVA (cerebrovascular accident) ([facility])     Current Outpatient Medications   Medication Sig    glimepiride (AMARYL) 4 MG tablet TAKE 1 TABLET BY MOUTH EVERY DAY IN THE MORNING    pravastatin (PRAVACHOL) 10 MG tablet Take 1 tablet by mouth nightly    famotidine (PEPCID) 20 MG tablet TAKE 1 TABLET BY MOUTH EVERYDAY AT BEDTIME    clopidogrel (PLAVIX) 75 MG tablet TAKE 1 TABLET BY MOUTH EVERY DAY    ondansetron (ZOFRAN-ODT) 4 MG disintegrating tablet TAKE 1 TABLET BY MOUTH EVERY 8 HOURS AS NEEDED FOR NAUSEA    metFORMIN (GLUCOPHAGE-XR) 500 MG extended release tablet TAKE 1 TABLET BY MOUTH TWICE A DAY WITH FOOD

## 2024-12-12 NOTE — PROGRESS NOTES
Chief Complaint   Patient presents with    Follow-Up from Hospital     \"Have you been to the ER, urgent care clinic since your last visit?  Hospitalized since your last visit?\"    YES - When: approximately 4 days ago.  Where and Why: [facility].    “Have you seen or consulted any other health care providers outside of [facility] since your last visit?”    NO

## 2024-12-16 DIAGNOSIS — E66.3 OVERWEIGHT: ICD-10-CM

## 2024-12-16 DIAGNOSIS — I65.09 OCCLUSION AND STENOSIS OF UNSPECIFIED VERTEBRAL ARTERY: ICD-10-CM

## 2024-12-16 RX ORDER — AMLODIPINE AND VALSARTAN 10; 320 MG/1; MG/1
1 TABLET ORAL DAILY
Qty: 90 TABLET | Refills: 1 | Status: SHIPPED | OUTPATIENT
Start: 2024-12-16

## 2024-12-18 RX ORDER — AMLODIPINE BESYLATE 10 MG/1
10 TABLET ORAL DAILY
Qty: 30 TABLET | Refills: 0 | Status: SHIPPED | OUTPATIENT
Start: 2024-12-18

## 2024-12-18 RX ORDER — VALSARTAN 320 MG/1
320 TABLET ORAL DAILY
Qty: 30 TABLET | Refills: 0 | Status: SHIPPED | OUTPATIENT
Start: 2024-12-18

## 2024-12-18 NOTE — PROGRESS NOTES
Physician Progress Note      PATIENT:               ALLEN SHER  CSN #:                  [id number]  :                       1940  ADMIT DATE:       2024 12:16 PM  DISCH DATE:        2024 5:18 PM  RESPONDING  PROVIDER #:        Maryann Escobedo MD          QUERY TEXT:    Patient arrived with slurred speech, episode of confusion and had seizure like   activity. Head CT and MRI were negative for an acute process. Patient has a   history of stroke. DCS noted, \"stroke-like symptoms.\" Neurology consult noted,   \"Presenting symptoms are concerning for a TIA.\" Progress note indicated,   \"stroke-like symptoms, possible seizure.\" After study, can the cause of the   presenting signs and symptoms be further specified as:    The medical record reflects the following:  Risk Factors: 84 y.o. male with previous CVA, DM2, HTN, HLD, DJD    Clinical Indicators:   Neurology Consultant  \"Presenting symptoms are concerning for a transient ischemic attack, acute   stroke given risk factors. Primary team has ordered a routine EEG given sx of   shaking last night.\"     PN  \"Stroke-like symptoms  Possible seizure\"     DCS  \"Stroke-like symptoms\"    \"CT of the head without contrast showed a partially with age-indeterminate   infarct along  medial PICA territory but CTA of the head and neck did not show   large vessel occlusion. Shortly after admission he had an episode of   confusion and seizure-like activity.\"    \"Principal Problem:    Acute CVA (cerebrovascular accident) (HCC)\"    Treatment: NSTU level of care, Brain imaging, NIHSS, Neurology Consult, EEG,   Neuro checks and VS per unit routine    Thank you,  Maria Esther Mosley, BSN, RN, CCRN, CRCR  Options provided:  -- Stroke-like symptoms due to TIA  -- Stroke-like symptoms due to Seizure related to previous stroke, TIA ruled   out after study  -- Stroke-like symptoms due to Seizure unrelated to previous stroke, TIA ruled   out after study  -- Other - I will

## 2024-12-18 NOTE — TELEPHONE ENCOUNTER
NP [place],    Per [facility], the Combo Amodipine-Valsartan 10-320mg is on backorder til January.    Asking for separate RX's until can fill w/the Combo sent on 12/16/24 for 90 + 1.      Entered both as requested.  Please Review. Mora Noriega    Previous refill encounter(s): 12/16/24 90 + 1 (Combination Med is on backorder)    Requested Prescriptions     Pending Prescriptions Disp Refills    amLODIPine (NORVASC) 10 MG tablet 30 tablet 0     Sig: Take 1 tablet by mouth daily **Replaces Combination Amlodipine-Valsartan due to backorder temporarily**    valsartan (DIOVAN) 320 MG tablet 30 tablet 0     Sig: Take 1 tablet by mouth daily **Replaces Combination Amlodipine-Valsartan due to backorder temporarily**     For Pharmacy Admin Tracking Only    Program: Medication Refill  CPA in place:    Recommendation Provided To:   Intervention Detail: New Rx: 2, reason: Patient Preference  Intervention Accepted By:   Gap Closed?:    Time Spent (min): 5

## 2024-12-27 ENCOUNTER — HOSPITAL ENCOUNTER (OUTPATIENT)
Facility: HOSPITAL | Age: 84
Discharge: HOME | End: 2024-12-30
Attending: ORTHOPAEDIC SURGERY
Payer: MEDICARE

## 2024-12-27 DIAGNOSIS — M16.11 PRIMARY OSTEOARTHRITIS OF RIGHT HIP: ICD-10-CM

## 2024-12-27 PROCEDURE — 20610 DRAIN/INJ JOINT/BURSA W/O US: CPT

## 2024-12-27 PROCEDURE — 77002 NEEDLE LOCALIZATION BY XRAY: CPT

## 2024-12-27 PROCEDURE — 6360000002 HC RX W HCPCS

## 2024-12-27 PROCEDURE — 6360000004 HC RX CONTRAST MEDICATION

## 2024-12-27 RX ORDER — LIDOCAINE HYDROCHLORIDE 10 MG/ML
5 INJECTION, SOLUTION EPIDURAL; INFILTRATION; INTRACAUDAL; PERINEURAL ONCE
Status: COMPLETED | OUTPATIENT
Start: 2024-12-27 | End: 2024-12-27

## 2024-12-27 RX ORDER — TRIAMCINOLONE ACETONIDE 40 MG/ML
40 INJECTION, SUSPENSION INTRA-ARTICULAR; INTRAMUSCULAR ONCE
Status: COMPLETED | OUTPATIENT
Start: 2024-12-27 | End: 2024-12-27

## 2024-12-27 RX ORDER — BUPIVACAINE HYDROCHLORIDE 5 MG/ML
3 INJECTION, SOLUTION EPIDURAL; INTRACAUDAL ONCE
Status: COMPLETED | OUTPATIENT
Start: 2024-12-27 | End: 2024-12-27

## 2024-12-27 RX ADMIN — TRIAMCINOLONE ACETONIDE 40 MG: 40 INJECTION, SUSPENSION INTRA-ARTICULAR; INTRAMUSCULAR at 14:11

## 2024-12-27 RX ADMIN — BUPIVACAINE HYDROCHLORIDE 5 MG: 5 INJECTION, SOLUTION EPIDURAL; INTRACAUDAL; PERINEURAL at 14:10

## 2024-12-27 RX ADMIN — IOHEXOL 5 ML: 180 INJECTION INTRAVENOUS at 14:10

## 2024-12-27 RX ADMIN — LIDOCAINE HYDROCHLORIDE 5 ML: 10 INJECTION, SOLUTION EPIDURAL; INFILTRATION; INTRACAUDAL; PERINEURAL at 14:11

## 2025-01-02 DIAGNOSIS — E11.9 TYPE 2 DIABETES MELLITUS WITHOUT COMPLICATIONS (HCC): ICD-10-CM

## 2025-01-02 RX ORDER — METFORMIN HYDROCHLORIDE 500 MG/1
500 TABLET, EXTENDED RELEASE ORAL 2 TIMES DAILY WITH MEALS
Qty: 180 TABLET | Refills: 1 | Status: SHIPPED | OUTPATIENT
Start: 2025-01-02

## 2025-01-20 RX ORDER — CLOPIDOGREL BISULFATE 75 MG/1
TABLET ORAL
Qty: 90 TABLET | Refills: 1 | Status: SHIPPED | OUTPATIENT
Start: 2025-01-20

## 2025-01-20 NOTE — PROGRESS NOTES
Chief Complaint   Patient presents with    Follow-Up from Hospital     TIA.  Patient reports headaches first thing in the morning with dizziness       HPI    Mr Driver is a 84 year old male here for his hospital FU. He was seen inpatient by Dr Hurd on 12/6/24. PMHX headaches, gerd, L VA occlusion, R MCA/ cerebellar stroke, DM2, HLD, HTN, DJD. He presented as code S for episode of aphasia. He may have had a brief episode of LOC as well.  Stroke work up was negative. He was not a TNK candidate. EEG was negative. He is currently on DAPT, which will continue. He is here today reporting that he is doing ok. He reports that first thing in the morning is when he feels the worst. He is battling with gerd. He wakes up feeling nauseous at times with a small headache. Usually feels better after he gets up and has some coffee. His headaches are usually in the front of his head with slight pain. He does report some dizziness with he stands up too long or turns his head to fast. No stroke like s/s since discharge. Tolerating his DAPT without any difficulty. Sleeps ok at night. Wakes up 1-2 times to use the bathroom. Could do better with water intake throughout the day. Having hip pain right now and seeing ortho for that.               Review of Systems   HENT:  Positive for hearing loss.    Eyes:  Negative for photophobia and visual disturbance.   Gastrointestinal:  Positive for nausea.   Neurological:  Positive for dizziness, light-headedness and headaches. Negative for speech difficulty and weakness.   Psychiatric/Behavioral:  Negative for confusion and sleep disturbance.          Past Medical History:   Diagnosis Date    Arthritis     Calculus of kidney     Cerebral artery occlusion with cerebral infarction (HCC) 10/2016    Cerebrovascular disease     Chronic back pain     Diabetes mellitus (HCC)     Elevated cholesterol 5/26/2010    Fracture of right ankle 5/26/2010    GERD (gastroesophageal reflux disease)     HBP (high

## 2025-01-21 ENCOUNTER — OFFICE VISIT (OUTPATIENT)
Age: 85
End: 2025-01-21
Payer: MEDICARE

## 2025-01-21 VITALS
BODY MASS INDEX: 28.2 KG/M2 | OXYGEN SATURATION: 98 % | DIASTOLIC BLOOD PRESSURE: 78 MMHG | RESPIRATION RATE: 18 BRPM | HEIGHT: 70 IN | WEIGHT: 197 LBS | SYSTOLIC BLOOD PRESSURE: 124 MMHG | HEART RATE: 88 BPM

## 2025-01-21 DIAGNOSIS — R29.90 STROKE-LIKE SYMPTOMS: ICD-10-CM

## 2025-01-21 DIAGNOSIS — Z09 HOSPITAL DISCHARGE FOLLOW-UP: ICD-10-CM

## 2025-01-21 DIAGNOSIS — Z86.73 H/O TIA (TRANSIENT ISCHEMIC ATTACK) AND STROKE: Primary | ICD-10-CM

## 2025-01-21 PROCEDURE — 99215 OFFICE O/P EST HI 40 MIN: CPT

## 2025-01-21 PROCEDURE — 1160F RVW MEDS BY RX/DR IN RCRD: CPT

## 2025-01-21 PROCEDURE — 1036F TOBACCO NON-USER: CPT

## 2025-01-21 PROCEDURE — 1159F MED LIST DOCD IN RCRD: CPT

## 2025-01-21 PROCEDURE — 3078F DIAST BP <80 MM HG: CPT

## 2025-01-21 PROCEDURE — 3074F SYST BP LT 130 MM HG: CPT

## 2025-01-21 PROCEDURE — 1111F DSCHRG MED/CURRENT MED MERGE: CPT

## 2025-01-21 PROCEDURE — 1126F AMNT PAIN NOTED NONE PRSNT: CPT

## 2025-01-21 PROCEDURE — 1123F ACP DISCUSS/DSCN MKR DOCD: CPT

## 2025-01-21 PROCEDURE — G8419 CALC BMI OUT NRM PARAM NOF/U: HCPCS

## 2025-01-21 PROCEDURE — G8427 DOCREV CUR MEDS BY ELIG CLIN: HCPCS

## 2025-01-21 ASSESSMENT — ENCOUNTER SYMPTOMS
PHOTOPHOBIA: 0
NAUSEA: 1

## 2025-01-21 NOTE — PROGRESS NOTES
Chief Complaint   Patient presents with    Follow-Up from Hospital     TIA.  Patient reports headaches first thing in the morning with dizziness      Vitals:    01/21/25 0816   BP: 124/78   Pulse: 88   Resp: 18   SpO2: 98%

## 2025-01-22 NOTE — PROGRESS NOTES
Neuroscience Patient Post Discharge Follow Up Phone Call - 30-day Follow-up  The Nurse Navigator attempted to contact the patient by telephone to perform a post-hospital discharge follow-up call. The patient's name and date of birth were verified as identifiers.     Discharge Information   Nurse Navigator unable to review discharge information due to not being able to reach the patient.     Post-Discharge Experience   Nurse Navigator was unable to review post-discharge experience as the patient could not be reached.     Patient Questions/Concerns   Nurse Navigator was unable to review questions or concerns as the patient could not be reached.     Plan   The Nurse Navigator was unable to review discharge information, patient experience, or address any questions and concerns as the patient could not be reached. A callback will be attempted within 24-48 hours.

## 2025-01-22 NOTE — PROGRESS NOTES
Neuroscience Patient Post Discharge Follow Up Phone Call - 30-day Follow-up    The Nurse Navigator contacted the patient by telephone to perform a post-hospital discharge follow-up call. The patient’s name and date of birth were verified as identifiers, and an introduction to self and role was provided.    Discharge Information   Diagnosis Understanding  The patient fully understands their diagnosis, treatment plan, and warning signs. Actively engaged. No additional educational needs identified.    Discharge Medications  The patient consistently takes medications as prescribed. Demonstrates full adherence with no reported issues.    Discharge Symptom Monitoring and Management  The patient monitors for new or worsening symptoms (i.e., weakness, speech, vision, balance, coordination, and numbness).  The patient was recommended to continue current symptom monitoring and management strategies.    30-day mRS Score  1 - No significant disability: able to carry out all usual activities, despite some symptoms. Patient reports that while slow, he is still able to carry out all activities he did prior to his admission.    Post-Discharge Experience   Follow Up Appointment  The patient has attended their follow-up appointment with Neurology on 1/21/2025.    Emergency Department Visits/Hospital Readmissions  The patient has not visited the emergency department or readmitted to the hospital recently.    Complications/Issues new complications or issues were reported.  The patient has not reported any complications since discharge.    Patient Questions/Concerns   Questions and Concerns  The patient did not have any questions or concerns at this time.    Plan   The patient reports that he is doing pretty well and has been attending all scheduled follow-up appointments.   Support group information was offered by the Nurse Navigator. The reports feeling well emotionally and believes he may not benefit from stroke support group

## 2025-01-31 DIAGNOSIS — K21.9 GASTRO-ESOPHAGEAL REFLUX DISEASE WITHOUT ESOPHAGITIS: ICD-10-CM

## 2025-01-31 RX ORDER — LANSOPRAZOLE 30 MG/1
CAPSULE, DELAYED RELEASE ORAL
Qty: 90 CAPSULE | Refills: 1 | Status: SHIPPED | OUTPATIENT
Start: 2025-01-31

## 2025-02-26 DIAGNOSIS — K21.9 GASTROESOPHAGEAL REFLUX DISEASE WITHOUT ESOPHAGITIS: ICD-10-CM

## 2025-02-26 RX ORDER — FAMOTIDINE 20 MG/1
20 TABLET, FILM COATED ORAL
Qty: 90 TABLET | Refills: 0 | Status: SHIPPED | OUTPATIENT
Start: 2025-02-26

## 2025-02-26 NOTE — TELEPHONE ENCOUNTER
PCP: Tg Fry APRN - NP    Last appt: 12/12/2024       No future appointments.    Requested Prescriptions     Pending Prescriptions Disp Refills    famotidine (PEPCID) 20 MG tablet [Pharmacy Med Name: FAMOTIDINE 20 MG TABLET] 90 tablet 1     Sig: TAKE 1 TABLET BY MOUTH EVERYDAY AT BEDTIME       Prior labs and Blood pressures:  BP Readings from Last 3 Encounters:   01/21/25 124/78   12/12/24 (!) 126/58   12/08/24 (!) 142/80     Lab Results   Component Value Date/Time     12/08/2024 12:03 AM    K 4.0 12/08/2024 12:03 AM     12/08/2024 12:03 AM    CO2 23 12/08/2024 12:03 AM    BUN 18 12/08/2024 12:03 AM    GFRAA >60 06/27/2022 09:00 AM     No results found for: \"HBA1C\", \"[id number]\"  Lab Results   Component Value Date/Time    CHOL 113 12/07/2024 05:47 AM    HDL 42 12/07/2024 05:47 AM    LDL 52.6 12/07/2024 05:47 AM    LDL 53.4 03/07/2024 09:43 AM    VLDL 18.4 12/07/2024 05:47 AM     No results found for: \"VITD3\"    Lab Results   Component Value Date/Time    TSH 1.28 12/07/2024 05:47 AM

## 2025-03-02 ENCOUNTER — APPOINTMENT (OUTPATIENT)
Facility: HOSPITAL | Age: 85
End: 2025-03-02
Payer: MEDICARE

## 2025-03-02 ENCOUNTER — HOSPITAL ENCOUNTER (EMERGENCY)
Facility: HOSPITAL | Age: 85
Discharge: HOME | End: 2025-03-02
Attending: EMERGENCY MEDICINE
Payer: MEDICARE

## 2025-03-02 VITALS
OXYGEN SATURATION: 96 % | HEART RATE: 61 BPM | WEIGHT: 204.37 LBS | HEIGHT: 70 IN | RESPIRATION RATE: 16 BRPM | DIASTOLIC BLOOD PRESSURE: 58 MMHG | BODY MASS INDEX: 29.26 KG/M2 | SYSTOLIC BLOOD PRESSURE: 129 MMHG | TEMPERATURE: 97.3 F

## 2025-03-02 DIAGNOSIS — R60.9 DEPENDENT EDEMA: Primary | ICD-10-CM

## 2025-03-02 LAB
ALBUMIN SERPL-MCNC: 3.8 G/DL (ref 3.5–5)
ALBUMIN/GLOB SERPL: 1.2 (ref 1.1–2.2)
ALP SERPL-CCNC: 82 U/L (ref 45–117)
ALT SERPL-CCNC: 27 U/L (ref 12–78)
ANION GAP SERPL CALC-SCNC: 9 MMOL/L (ref 2–12)
AST SERPL-CCNC: 16 U/L (ref 15–37)
BASOPHILS # BLD: 0.03 K/UL (ref 0–0.1)
BASOPHILS NFR BLD: 0.4 % (ref 0–1)
BILIRUB SERPL-MCNC: 0.5 MG/DL (ref 0.2–1)
BUN SERPL-MCNC: 15 MG/DL (ref 6–20)
BUN/CREAT SERPL: 14 (ref 12–20)
CALCIUM SERPL-MCNC: 8.6 MG/DL (ref 8.5–10.1)
CHLORIDE SERPL-SCNC: 109 MMOL/L (ref 97–108)
CO2 SERPL-SCNC: 25 MMOL/L (ref 21–32)
COMMENT:: NORMAL
CREAT SERPL-MCNC: 1.08 MG/DL (ref 0.7–1.3)
DIFFERENTIAL METHOD BLD: ABNORMAL
ECHO BSA: 2.14 M2
EKG DIAGNOSIS: NORMAL
EKG Q-T INTERVAL: 390 MS
EKG QRS DURATION: 90 MS
EKG QTC CALCULATION (BAZETT): 432 MS
EKG R AXIS: -26 DEGREES
EKG T AXIS: -5 DEGREES
EKG VENTRICULAR RATE: 74 BPM
EOSINOPHIL # BLD: 0.12 K/UL (ref 0–0.4)
EOSINOPHIL NFR BLD: 1.5 % (ref 0–7)
ERYTHROCYTE [DISTWIDTH] IN BLOOD BY AUTOMATED COUNT: 17.2 % (ref 11.5–14.5)
GLOBULIN SER CALC-MCNC: 3.1 G/DL (ref 2–4)
GLUCOSE SERPL-MCNC: 138 MG/DL (ref 65–100)
HCT VFR BLD AUTO: 33.1 % (ref 36.6–50.3)
HGB BLD-MCNC: 10.1 G/DL (ref 12.1–17)
IMM GRANULOCYTES # BLD AUTO: 0.05 K/UL (ref 0–0.04)
IMM GRANULOCYTES NFR BLD AUTO: 0.6 % (ref 0–0.5)
LYMPHOCYTES # BLD: 1.3 K/UL (ref 0.8–3.5)
LYMPHOCYTES NFR BLD: 16.3 % (ref 12–49)
MCH RBC QN AUTO: 23.9 PG (ref 26–34)
MCHC RBC AUTO-ENTMCNC: 30.5 G/DL (ref 30–36.5)
MCV RBC AUTO: 78.3 FL (ref 80–99)
MONOCYTES # BLD: 0.58 K/UL (ref 0–1)
MONOCYTES NFR BLD: 7.3 % (ref 5–13)
NEUTS SEG # BLD: 5.92 K/UL (ref 1.8–8)
NEUTS SEG NFR BLD: 73.9 % (ref 32–75)
NRBC # BLD: 0 K/UL (ref 0–0.01)
NRBC BLD-RTO: 0 PER 100 WBC
NT PRO BNP: 75 PG/ML (ref 0–450)
PLATELET # BLD AUTO: 311 K/UL (ref 150–400)
PMV BLD AUTO: 9.5 FL (ref 8.9–12.9)
POTASSIUM SERPL-SCNC: 4.2 MMOL/L (ref 3.5–5.1)
PROT SERPL-MCNC: 6.9 G/DL (ref 6.4–8.2)
RBC # BLD AUTO: 4.23 M/UL (ref 4.1–5.7)
RBC MORPH BLD: ABNORMAL
SODIUM SERPL-SCNC: 143 MMOL/L (ref 136–145)
SPECIMEN HOLD: NORMAL
TROPONIN I SERPL HS-MCNC: 6 NG/L (ref 0–76)
WBC # BLD AUTO: 8 K/UL (ref 4.1–11.1)

## 2025-03-02 PROCEDURE — 80053 COMPREHEN METABOLIC PANEL: CPT

## 2025-03-02 PROCEDURE — 85025 COMPLETE CBC W/AUTO DIFF WBC: CPT

## 2025-03-02 PROCEDURE — 36415 COLL VENOUS BLD VENIPUNCTURE: CPT

## 2025-03-02 PROCEDURE — 84484 ASSAY OF TROPONIN QUANT: CPT

## 2025-03-02 PROCEDURE — 93971 EXTREMITY STUDY: CPT

## 2025-03-02 PROCEDURE — 71046 X-RAY EXAM CHEST 2 VIEWS: CPT

## 2025-03-02 PROCEDURE — 83880 ASSAY OF NATRIURETIC PEPTIDE: CPT

## 2025-03-02 PROCEDURE — 93005 ELECTROCARDIOGRAM TRACING: CPT | Performed by: EMERGENCY MEDICINE

## 2025-03-02 PROCEDURE — 99285 EMERGENCY DEPT VISIT HI MDM: CPT

## 2025-03-02 ASSESSMENT — LIFESTYLE VARIABLES
HOW OFTEN DO YOU HAVE A DRINK CONTAINING ALCOHOL: NEVER
HOW MANY STANDARD DRINKS CONTAINING ALCOHOL DO YOU HAVE ON A TYPICAL DAY: PATIENT DOES NOT DRINK

## 2025-03-02 NOTE — ED NOTES
Verbal shift change report given to RODRIGUEZ Arenas (oncoming nurse) by RODRIGUEZ Lowery (offgoing nurse). Report included the following information SBAR, ED Summary, Intake/Output, MAR and Recent Results.

## 2025-03-02 NOTE — ED PROVIDER NOTES
DISPOSITION Decision To Discharge 03/02/2025 04:00:47 PM      PATIENT REFERRED TO:  Tg Fry APRN - NP  [address] [facility] [address] [phone]    Schedule an appointment as soon as possible for a visit       [facility]  [id number] [place] [id number] [phone]    If symptoms worsen      DISCHARGE MEDICATIONS:  New Prescriptions    No medications on file         (Please note that portions of this note were completed with a voice recognition program.  Efforts were made to edit the dictations but occasionally words are mis-transcribed.)    Huber Orozco MD (electronically signed)  Emergency Attending Physician / Physician Assistant / Nurse Practitioner           Hbuer Orozco MD  03/02/25 1539       Huber Orozco MD  03/02/25 1600

## 2025-03-02 NOTE — ED TRIAGE NOTES
Patient arrives with c/o right leg swelling, and pain for the last month. Patient has pitting edema to both ankles, worse on the right. Patient denies injury. Patient adds he's been getting dizzy when standing, nausea everyday when he wakes up, and some SOB. Hx of HTN, TIA.

## 2025-03-22 SDOH — ECONOMIC STABILITY: TRANSPORTATION INSECURITY
IN THE PAST 12 MONTHS, HAS THE LACK OF TRANSPORTATION KEPT YOU FROM MEDICAL APPOINTMENTS OR FROM GETTING MEDICATIONS?: NO

## 2025-03-22 SDOH — ECONOMIC STABILITY: FOOD INSECURITY: WITHIN THE PAST 12 MONTHS, THE FOOD YOU BOUGHT JUST DIDN'T LAST AND YOU DIDN'T HAVE MONEY TO GET MORE.: NEVER TRUE

## 2025-03-22 SDOH — ECONOMIC STABILITY: FOOD INSECURITY: WITHIN THE PAST 12 MONTHS, YOU WORRIED THAT YOUR FOOD WOULD RUN OUT BEFORE YOU GOT MONEY TO BUY MORE.: NEVER TRUE

## 2025-03-24 ENCOUNTER — HOSPITAL ENCOUNTER (OUTPATIENT)
Facility: HOSPITAL | Age: 85
Discharge: HOME | End: 2025-03-27
Payer: MEDICARE

## 2025-03-24 ENCOUNTER — OFFICE VISIT (OUTPATIENT)
Age: 85
End: 2025-03-24
Payer: MEDICARE

## 2025-03-24 VITALS
BODY MASS INDEX: 28.89 KG/M2 | WEIGHT: 201.8 LBS | HEART RATE: 75 BPM | RESPIRATION RATE: 18 BRPM | SYSTOLIC BLOOD PRESSURE: 138 MMHG | TEMPERATURE: 97.8 F | OXYGEN SATURATION: 96 % | DIASTOLIC BLOOD PRESSURE: 56 MMHG | HEIGHT: 70 IN

## 2025-03-24 DIAGNOSIS — I10 BENIGN ESSENTIAL HTN: ICD-10-CM

## 2025-03-24 DIAGNOSIS — M25.571 ACUTE RIGHT ANKLE PAIN: ICD-10-CM

## 2025-03-24 DIAGNOSIS — M79.671 RIGHT FOOT PAIN: ICD-10-CM

## 2025-03-24 DIAGNOSIS — E11.9 TYPE 2 DIABETES MELLITUS WITHOUT COMPLICATION, WITHOUT LONG-TERM CURRENT USE OF INSULIN: ICD-10-CM

## 2025-03-24 DIAGNOSIS — M79.89 SWELLING OF RIGHT FOOT: ICD-10-CM

## 2025-03-24 DIAGNOSIS — M79.671 RIGHT FOOT PAIN: Primary | ICD-10-CM

## 2025-03-24 LAB
ANION GAP SERPL CALC-SCNC: 5 MMOL/L (ref 2–12)
BUN SERPL-MCNC: 13 MG/DL (ref 6–20)
BUN/CREAT SERPL: 13 (ref 12–20)
CALCIUM SERPL-MCNC: 9.3 MG/DL (ref 8.5–10.1)
CHLORIDE SERPL-SCNC: 111 MMOL/L (ref 97–108)
CO2 SERPL-SCNC: 24 MMOL/L (ref 21–32)
CREAT SERPL-MCNC: 1.02 MG/DL (ref 0.7–1.3)
GLUCOSE SERPL-MCNC: 165 MG/DL (ref 65–100)
POTASSIUM SERPL-SCNC: 4.5 MMOL/L (ref 3.5–5.1)
SODIUM SERPL-SCNC: 140 MMOL/L (ref 136–145)
URATE SERPL-MCNC: 3.9 MG/DL (ref 3.5–7.2)

## 2025-03-24 PROCEDURE — 1123F ACP DISCUSS/DSCN MKR DOCD: CPT | Performed by: FAMILY MEDICINE

## 2025-03-24 PROCEDURE — 1126F AMNT PAIN NOTED NONE PRSNT: CPT | Performed by: FAMILY MEDICINE

## 2025-03-24 PROCEDURE — 99214 OFFICE O/P EST MOD 30 MIN: CPT | Performed by: FAMILY MEDICINE

## 2025-03-24 PROCEDURE — 73630 X-RAY EXAM OF FOOT: CPT

## 2025-03-24 PROCEDURE — 1036F TOBACCO NON-USER: CPT | Performed by: FAMILY MEDICINE

## 2025-03-24 PROCEDURE — 3078F DIAST BP <80 MM HG: CPT | Performed by: FAMILY MEDICINE

## 2025-03-24 PROCEDURE — G8427 DOCREV CUR MEDS BY ELIG CLIN: HCPCS | Performed by: FAMILY MEDICINE

## 2025-03-24 PROCEDURE — 1159F MED LIST DOCD IN RCRD: CPT | Performed by: FAMILY MEDICINE

## 2025-03-24 PROCEDURE — 3075F SYST BP GE 130 - 139MM HG: CPT | Performed by: FAMILY MEDICINE

## 2025-03-24 PROCEDURE — 73610 X-RAY EXAM OF ANKLE: CPT

## 2025-03-24 PROCEDURE — G8419 CALC BMI OUT NRM PARAM NOF/U: HCPCS | Performed by: FAMILY MEDICINE

## 2025-03-24 RX ORDER — FUROSEMIDE 20 MG/1
20 TABLET ORAL DAILY
Qty: 60 TABLET | Refills: 3 | Status: CANCELLED | OUTPATIENT
Start: 2025-03-24

## 2025-03-24 NOTE — PROGRESS NOTES
Patient Name: Jeffrey Driver   MRN: [id number]    ASSESSMENT AND PLAN  Jeffrey Driver is a 84 y.o. male who presents today for:    Assessment & Plan  Right foot pain  Will evaluate with updated x-ray and check for uric acid to rule out gout.  Continue with as needed Tylenol, elevation, and compression wrapping.    Orders:    Uric Acid; Future    Basic Metabolic Panel; Future    XR FOOT RIGHT (MIN 3 VIEWS); Future    XR ANKLE RIGHT (MIN 3 VIEWS); Future    Acute right ankle pain   Will evaluate with updated x-ray and check for uric acid to rule out gout.  Continue with as needed Tylenol, elevation, and compression wrapping.    Orders:    Uric Acid; Future    Basic Metabolic Panel; Future    XR FOOT RIGHT (MIN 3 VIEWS); Future    XR ANKLE RIGHT (MIN 3 VIEWS); Future    Swelling of right foot   Will evaluate with updated x-ray and check for uric acid to rule out gout.  Continue with as needed Tylenol, elevation, and compression wrapping.    Orders:    Uric Acid; Future    Basic Metabolic Panel; Future    XR FOOT RIGHT (MIN 3 VIEWS); Future    XR ANKLE RIGHT (MIN 3 VIEWS); Future    Benign essential HTN   Chronic, at goal (stable), continue current treatment plan         Type 2 diabetes mellitus without complication, without long-term current use of insulin   Chronic, at goal (stable), continue current treatment plan           No orders of the defined types were placed in this encounter.       There are no discontinued medications.      SUBJECTIVE  Jeffrey Driver is a 84 y.o. male who presents with the following:     Reports 1 month history of increased pain and swelling along his right leg and right foot.  Area of most concern is pain along the top of his right foot.  Recently went to the ER for the symptoms; workup notable for negative DVT and normal BNP.  No prior history of known gout.  He does have osteoarthritis.     All other ROS were reviewed and are negative except as discussed in HPI.  The patient's

## 2025-03-24 NOTE — PROGRESS NOTES
Chief Complaint   Patient presents with    Leg Swelling     Right leg and foot swelling and painful    Leg Pain     Left leg pain; comes and goes- 7/10      \"Have you been to the ER, urgent care clinic since your last visit?  Hospitalized since your last visit?\"    Yes. 3/2/25- [facility] ER- Edema.     “Have you seen or consulted any other health care providers outside of [facility] since your last visit?”    NO      Financial Resource Strain: Low Risk  (3/4/2024)    Overall Financial Resource Strain (CARDIA)     Difficulty of Paying Living Expenses: Not hard at all      Food Insecurity: No Food Insecurity (3/22/2025)    Hunger Vital Sign     Worried About Running Out of Food in the Last Year: Never true     Ran Out of Food in the Last Year: Never true            1/21/2025     8:17 AM   PHQ-9    Little interest or pleasure in doing things 0   Feeling down, depressed, or hopeless 0   PHQ-2 Score 0   PHQ-9 Total Score 0       Health Maintenance Due   Topic Date Due    DTaP/Tdap/Td vaccine (1 - Tdap) Never done    Respiratory Syncytial Virus (RSV) Pregnant or age 60 yrs+ (1 - 1-dose 75+ series) Never done    COVID-19 Vaccine (4 - 2024-25 season) 09/01/2024

## 2025-03-25 ENCOUNTER — RESULTS FOLLOW-UP (OUTPATIENT)
Age: 85
End: 2025-03-25

## 2025-03-28 NOTE — TELEPHONE ENCOUNTER
PCP: Tg Fry APRN - NP    Last appt: 3/24/2025   No future appointments.    Requested Prescriptions     Pending Prescriptions Disp Refills    ondansetron (ZOFRAN-ODT) 4 MG disintegrating tablet [Pharmacy Med Name: ONDANSETRON ODT 4 MG TABLET] 30 tablet 2     Sig: DISSOLVE 1 TABLET IN MOUTH EVERY 8 HOURS AS NEEDED FOR NAUSEA

## 2025-03-30 RX ORDER — ONDANSETRON 4 MG/1
TABLET, ORALLY DISINTEGRATING ORAL
Qty: 30 TABLET | Refills: 2 | Status: SHIPPED | OUTPATIENT
Start: 2025-03-30

## 2025-04-21 RX ORDER — VALSARTAN 320 MG/1
TABLET ORAL
Qty: 30 TABLET | Refills: 0 | Status: SHIPPED | OUTPATIENT
Start: 2025-04-21

## 2025-04-21 RX ORDER — AMLODIPINE AND VALSARTAN 10; 320 MG/1; MG/1
1 TABLET ORAL DAILY
Qty: 90 TABLET | Refills: 1 | OUTPATIENT
Start: 2025-04-21

## 2025-04-21 NOTE — TELEPHONE ENCOUNTER
PCP: Tg Fry, APRN - NP    Last appt: 3/24/2025     Future Appointments   Date Time Provider Department Center   5/23/2025  9:30 AM Rodrick Tellez MD [facility]       Requested Prescriptions     Pending Prescriptions Disp Refills    valsartan (DIOVAN) 320 MG tablet [Pharmacy Med Name: VALSARTAN 320 MG TAB] 30 tablet 0     Sig: TAKE ONE TABLET BY MOUTH ONE TIME DAILY **REPLACES COMBINATION AMLODIPINE-VALSARTAN DUE TO BACKORDER TEMPORARILY**    amLODIPine-valsartan (EXFORGE)  MG per tablet [Pharmacy Med Name: AMLODIPINE-VALSARTAN  MG TAB] 90 tablet 1     Sig: TAKE ONE TABLET BY MOUTH ONE TIME DAILY       Prior labs and Blood pressures:  BP Readings from Last 3 Encounters:   03/24/25 (!) 138/56   03/02/25 (!) 129/58   01/21/25 124/78     Lab Results   Component Value Date/Time     03/24/2025 09:14 AM    K 4.5 03/24/2025 09:14 AM     03/24/2025 09:14 AM    CO2 24 03/24/2025 09:14 AM    BUN 13 03/24/2025 09:14 AM    GFRAA >60 06/27/2022 09:00 AM     No results found for: \"HBA1C\", \"[id number]\"  Lab Results   Component Value Date/Time    CHOL 113 12/07/2024 05:47 AM    HDL 42 12/07/2024 05:47 AM    LDL 52.6 12/07/2024 05:47 AM    LDL 53.4 03/07/2024 09:43 AM    VLDL 18.4 12/07/2024 05:47 AM     No results found for: \"VITD3\"    Lab Results   Component Value Date/Time    TSH 1.28 12/07/2024 05:47 AM

## 2025-04-23 DIAGNOSIS — E78.00 PURE HYPERCHOLESTEROLEMIA, UNSPECIFIED: ICD-10-CM

## 2025-04-23 RX ORDER — PRAVASTATIN SODIUM 10 MG
10 TABLET ORAL NIGHTLY
Qty: 90 TABLET | Refills: 0 | Status: SHIPPED | OUTPATIENT
Start: 2025-04-23

## 2025-04-23 NOTE — TELEPHONE ENCOUNTER
PCP: Tg Fry APRN - NP    Last appt: 3/24/2025       Future Appointments   Date Time Provider Department Center   5/23/2025  9:30 AM Rodrick Tellez MD [facility]       Requested Prescriptions     Pending Prescriptions Disp Refills    pravastatin (PRAVACHOL) 10 MG tablet [Pharmacy Med Name: PRAVASTATIN SODIUM 10 MG TAB] 90 tablet 1     Sig: TAKE 1 TABLET BY MOUTH EVERY DAY AT NIGHT       Prior labs and Blood pressures:  BP Readings from Last 3 Encounters:   03/24/25 (!) 138/56   03/02/25 (!) 129/58   01/21/25 124/78     Lab Results   Component Value Date/Time     03/24/2025 09:14 AM    K 4.5 03/24/2025 09:14 AM     03/24/2025 09:14 AM    CO2 24 03/24/2025 09:14 AM    BUN 13 03/24/2025 09:14 AM    GFRAA >60 06/27/2022 09:00 AM     No results found for: \"HBA1C\", \"[id number]\"  Lab Results   Component Value Date/Time    CHOL 113 12/07/2024 05:47 AM    HDL 42 12/07/2024 05:47 AM    LDL 52.6 12/07/2024 05:47 AM    LDL 53.4 03/07/2024 09:43 AM    VLDL 18.4 12/07/2024 05:47 AM     No results found for: \"VITD3\"    Lab Results   Component Value Date/Time    TSH 1.28 12/07/2024 05:47 AM

## 2025-04-24 RX ORDER — AMLODIPINE AND VALSARTAN 10; 320 MG/1; MG/1
1 TABLET ORAL DAILY
Qty: 90 TABLET | Refills: 0 | Status: SHIPPED | OUTPATIENT
Start: 2025-04-24

## 2025-04-24 RX ORDER — AMLODIPINE AND VALSARTAN 10; 320 MG/1; MG/1
1 TABLET ORAL DAILY
Qty: 90 TABLET | Refills: 1 | OUTPATIENT
Start: 2025-04-24

## 2025-04-24 NOTE — TELEPHONE ENCOUNTER
PCP: Tg Fry APRN - NP    Last appt: 3/24/2025       Future Appointments   Date Time Provider Department Center   5/23/2025  9:30 AM Rodrick Tellez MD [facility]       Requested Prescriptions     Pending Prescriptions Disp Refills    amLODIPine-valsartan (EXFORGE)  MG per tablet [Pharmacy Med Name: AMLODIPINE-VALSARTAN  MG TAB] 90 tablet 1     Sig: TAKE ONE TABLET BY MOUTH ONE TIME DAILY       Prior labs and Blood pressures:  BP Readings from Last 3 Encounters:   03/24/25 (!) 138/56   03/02/25 (!) 129/58   01/21/25 124/78     Lab Results   Component Value Date/Time     03/24/2025 09:14 AM    K 4.5 03/24/2025 09:14 AM     03/24/2025 09:14 AM    CO2 24 03/24/2025 09:14 AM    BUN 13 03/24/2025 09:14 AM    GFRAA >60 06/27/2022 09:00 AM     No results found for: \"HBA1C\", \"[id number]\"  Lab Results   Component Value Date/Time    CHOL 113 12/07/2024 05:47 AM    HDL 42 12/07/2024 05:47 AM    LDL 52.6 12/07/2024 05:47 AM    LDL 53.4 03/07/2024 09:43 AM    VLDL 18.4 12/07/2024 05:47 AM     No results found for: \"VITD3\"    Lab Results   Component Value Date/Time    TSH 1.28 12/07/2024 05:47 AM

## 2025-04-24 NOTE — TELEPHONE ENCOUNTER
ALEXUS Fry,    Call from [facility], stating patient is requesting to fill the combination Amlodipine-Valsartan (previously could not fill due to backorder and )-  [facility] stating they now have in stock and patient requesting 90 d/s.    (New Valsartan rx sent on Monday- has not filled as wants the combo).  (Please d/c this and amlodipine)    RE-Entered Amlodipine-Valsartan 10-320mg daily if appropriate.  Thanks, Mora    Last appointment: 3/25/25 (acute) [facility], 12/12/24 [place]  Next appointment: None  Previous refill encounter(s): 12/16/24 90 + 1 (discontinued due to backorder- and ordered separately)    Requested Prescriptions     Pending Prescriptions Disp Refills    amLODIPine-valsartan (EXFORGE)  MG per tablet 90 tablet 1     Sig: Take 1 tablet by mouth daily     For Pharmacy Admin Tracking Only    Program: Medication Refill  CPA in place:    Recommendation Provided To:   Intervention Detail: New Rx: 1, reason: Patient Preference  Intervention Accepted By:   Gap Closed?:    Time Spent (min): 5

## 2025-05-06 DIAGNOSIS — E11.9 TYPE 2 DIABETES MELLITUS WITHOUT COMPLICATIONS (HCC): ICD-10-CM

## 2025-05-06 RX ORDER — GLIMEPIRIDE 4 MG/1
4 TABLET ORAL EVERY MORNING
Qty: 90 TABLET | Refills: 0 | Status: SHIPPED | OUTPATIENT
Start: 2025-05-06

## 2025-05-06 NOTE — TELEPHONE ENCOUNTER
PCP: Tg Fry, APRN - NP    Last appt: 3/24/2025     Future Appointments   Date Time Provider Department Center   5/23/2025  9:30 AM Rodrick Tellez MD [facility]       Requested Prescriptions     Pending Prescriptions Disp Refills    glimepiride (AMARYL) 4 MG tablet [Pharmacy Med Name: GLIMEPIRIDE 4 MG TABLET] 90 tablet 1     Sig: TAKE 1 TABLET BY MOUTH EVERY DAY IN THE MORNING       Prior labs and Blood pressures:  BP Readings from Last 3 Encounters:   03/24/25 (!) 138/56   03/02/25 (!) 129/58   01/21/25 124/78     Lab Results   Component Value Date/Time     03/24/2025 09:14 AM    K 4.5 03/24/2025 09:14 AM     03/24/2025 09:14 AM    CO2 24 03/24/2025 09:14 AM    BUN 13 03/24/2025 09:14 AM    GFRAA >60 06/27/2022 09:00 AM     No results found for: \"HBA1C\", \"[id number]\"  Lab Results   Component Value Date/Time    CHOL 113 12/07/2024 05:47 AM    HDL 42 12/07/2024 05:47 AM    LDL 52.6 12/07/2024 05:47 AM    LDL 53.4 03/07/2024 09:43 AM    VLDL 18.4 12/07/2024 05:47 AM     No results found for: \"VITD3\"    Lab Results   Component Value Date/Time    TSH 1.28 12/07/2024 05:47 AM

## 2025-05-21 ENCOUNTER — HOSPITAL ENCOUNTER (OUTPATIENT)
Facility: HOSPITAL | Age: 85
Discharge: HOME | End: 2025-05-24
Payer: MEDICARE

## 2025-05-21 VITALS
SYSTOLIC BLOOD PRESSURE: 137 MMHG | TEMPERATURE: 97.8 F | HEIGHT: 69 IN | HEART RATE: 70 BPM | BODY MASS INDEX: 29.89 KG/M2 | DIASTOLIC BLOOD PRESSURE: 71 MMHG | RESPIRATION RATE: 18 BRPM | WEIGHT: 201.8 LBS

## 2025-05-21 LAB
ALBUMIN SERPL-MCNC: 4.1 G/DL (ref 3.5–5)
ALBUMIN/GLOB SERPL: 1.5 (ref 1.1–2.2)
ALP SERPL-CCNC: 85 U/L (ref 45–117)
ALT SERPL-CCNC: 25 U/L (ref 12–78)
ANION GAP SERPL CALC-SCNC: 5 MMOL/L (ref 2–12)
APPEARANCE UR: CLEAR
AST SERPL-CCNC: 18 U/L (ref 15–37)
BACTERIA URNS QL MICRO: NEGATIVE /HPF
BASOPHILS # BLD: 0.04 K/UL (ref 0–0.1)
BASOPHILS NFR BLD: 0.5 % (ref 0–1)
BILIRUB SERPL-MCNC: 0.5 MG/DL (ref 0.2–1)
BILIRUB UR QL: NEGATIVE
BUN SERPL-MCNC: 17 MG/DL (ref 6–20)
BUN/CREAT SERPL: 18 (ref 12–20)
CALCIUM SERPL-MCNC: 9.2 MG/DL (ref 8.5–10.1)
CHLORIDE SERPL-SCNC: 108 MMOL/L (ref 97–108)
CO2 SERPL-SCNC: 27 MMOL/L (ref 21–32)
COLOR UR: ABNORMAL
CREAT SERPL-MCNC: 0.96 MG/DL (ref 0.7–1.3)
DIFFERENTIAL METHOD BLD: ABNORMAL
EKG ATRIAL RATE: 69 BPM
EKG DIAGNOSIS: NORMAL
EKG P AXIS: 43 DEGREES
EKG P-R INTERVAL: 196 MS
EKG Q-T INTERVAL: 392 MS
EKG QRS DURATION: 102 MS
EKG QTC CALCULATION (BAZETT): 420 MS
EKG R AXIS: -23 DEGREES
EKG T AXIS: -8 DEGREES
EKG VENTRICULAR RATE: 69 BPM
EOSINOPHIL # BLD: 0.08 K/UL (ref 0–0.4)
EOSINOPHIL NFR BLD: 1 % (ref 0–7)
EPITH CASTS URNS QL MICRO: ABNORMAL /LPF
ERYTHROCYTE [DISTWIDTH] IN BLOOD BY AUTOMATED COUNT: 17.6 % (ref 11.5–14.5)
EST. AVERAGE GLUCOSE BLD GHB EST-MCNC: 166 MG/DL
GLOBULIN SER CALC-MCNC: 2.7 G/DL (ref 2–4)
GLUCOSE SERPL-MCNC: 106 MG/DL (ref 65–100)
GLUCOSE UR STRIP.AUTO-MCNC: 250 MG/DL
HBA1C MFR BLD: 7.4 % (ref 4–5.6)
HCT VFR BLD AUTO: 32.6 % (ref 36.6–50.3)
HGB BLD-MCNC: 9.9 G/DL (ref 12.1–17)
HGB UR QL STRIP: NEGATIVE
HYALINE CASTS URNS QL MICRO: ABNORMAL /LPF (ref 0–5)
IMM GRANULOCYTES # BLD AUTO: 0.07 K/UL (ref 0–0.04)
IMM GRANULOCYTES NFR BLD AUTO: 0.9 % (ref 0–0.5)
KETONES UR QL STRIP.AUTO: NEGATIVE MG/DL
LEUKOCYTE ESTERASE UR QL STRIP.AUTO: NEGATIVE
LYMPHOCYTES # BLD: 1.31 K/UL (ref 0.8–3.5)
LYMPHOCYTES NFR BLD: 16.7 % (ref 12–49)
MCH RBC QN AUTO: 23.9 PG (ref 26–34)
MCHC RBC AUTO-ENTMCNC: 30.4 G/DL (ref 30–36.5)
MCV RBC AUTO: 78.6 FL (ref 80–99)
MONOCYTES # BLD: 0.62 K/UL (ref 0–1)
MONOCYTES NFR BLD: 7.9 % (ref 5–13)
NEUTS SEG # BLD: 5.71 K/UL (ref 1.8–8)
NEUTS SEG NFR BLD: 73 % (ref 32–75)
NITRITE UR QL STRIP.AUTO: NEGATIVE
NRBC # BLD: 0 K/UL (ref 0–0.01)
NRBC BLD-RTO: 0 PER 100 WBC
PH UR STRIP: 5.5 (ref 5–8)
PLATELET # BLD AUTO: 315 K/UL (ref 150–400)
PMV BLD AUTO: 10.7 FL (ref 8.9–12.9)
POTASSIUM SERPL-SCNC: 4 MMOL/L (ref 3.5–5.1)
PROT SERPL-MCNC: 6.8 G/DL (ref 6.4–8.2)
PROT UR STRIP-MCNC: NEGATIVE MG/DL
RBC # BLD AUTO: 4.15 M/UL (ref 4.1–5.7)
RBC #/AREA URNS HPF: ABNORMAL /HPF (ref 0–5)
SODIUM SERPL-SCNC: 140 MMOL/L (ref 136–145)
SP GR UR REFRACTOMETRY: 1.02 (ref 1–1.03)
URINE CULTURE IF INDICATED: ABNORMAL
UROBILINOGEN UR QL STRIP.AUTO: 0.2 EU/DL (ref 0.2–1)
WBC # BLD AUTO: 7.8 K/UL (ref 4.1–11.1)
WBC URNS QL MICRO: ABNORMAL /HPF (ref 0–4)

## 2025-05-21 PROCEDURE — 81001 URINALYSIS AUTO W/SCOPE: CPT

## 2025-05-21 PROCEDURE — 83036 HEMOGLOBIN GLYCOSYLATED A1C: CPT

## 2025-05-21 PROCEDURE — 93005 ELECTROCARDIOGRAM TRACING: CPT | Performed by: STUDENT IN AN ORGANIZED HEALTH CARE EDUCATION/TRAINING PROGRAM

## 2025-05-21 PROCEDURE — 80053 COMPREHEN METABOLIC PANEL: CPT

## 2025-05-21 PROCEDURE — 85025 COMPLETE CBC W/AUTO DIFF WBC: CPT

## 2025-05-21 NOTE — PERIOP NOTE
PAT: 5-21  @ 3;00   
surgical wound is healed, wear clothing and sleep on bed linens that are clean.  Do not allow pets to sleep in your bed with you or touch your surgical wound.  Do not smoke - smoking delays wound healing. This may be a good time to stop smoking.  If you have diabetes, it is important for you to manage your blood sugar levels properly before your surgery as well as after your surgery. Poorly managed blood sugar levels slow down wound healing and prevent you from healing completely.     Follow all instructions so your surgery won’t be cancelled.  Please, be on time.                    If a situation occurs and you are delayed the day of surgery, call [phone]/ [phone].    If your physical condition changes (like a fever, cold, flu, etc.) call your surgeon as soon as possible.    Home medication(s) reviewed and verified via during PAT appointment/call.    The patient was contacted in person.     He verbalized understanding of all instructions does not need reinforcement.

## 2025-05-23 DIAGNOSIS — K21.9 GASTROESOPHAGEAL REFLUX DISEASE WITHOUT ESOPHAGITIS: ICD-10-CM

## 2025-05-23 RX ORDER — FAMOTIDINE 20 MG/1
20 TABLET, FILM COATED ORAL
Qty: 30 TABLET | Refills: 0 | Status: SHIPPED | OUTPATIENT
Start: 2025-05-23

## 2025-05-23 NOTE — TELEPHONE ENCOUNTER
PCP: Tg Fry APRN - NP    Last appt: 3/24/2025     Future Appointments   Date Time Provider Department Center   5/23/2025  9:30 AM Rodrick Tellez MD [facility] AM[facility]       Requested Prescriptions     Pending Prescriptions Disp Refills    famotidine (PEPCID) 20 MG tablet [Pharmacy Med Name: FAMOTIDINE 20 MG TABLET] 90 tablet 0     Sig: TAKE 1 TABLET BY MOUTH EVERYDAY AT BEDTIME       Prior labs and Blood pressures:  BP Readings from Last 3 Encounters:   05/21/25 137/71   03/24/25 (!) 138/56   03/02/25 (!) 129/58     Lab Results   Component Value Date/Time     05/21/2025 02:28 PM    K 4.0 05/21/2025 02:28 PM     05/21/2025 02:28 PM    CO2 27 05/21/2025 02:28 PM    BUN 17 05/21/2025 02:28 PM    GFRAA >60 06/27/2022 09:00 AM     No results found for: \"HBA1C\", \"[id number]\"  Lab Results   Component Value Date/Time    CHOL 113 12/07/2024 05:47 AM    HDL 42 12/07/2024 05:47 AM    LDL 52.6 12/07/2024 05:47 AM    LDL 53.4 03/07/2024 09:43 AM    VLDL 18.4 12/07/2024 05:47 AM     No results found for: \"VITD3\"    Lab Results   Component Value Date/Time    TSH 1.28 12/07/2024 05:47 AM

## 2025-06-02 ENCOUNTER — HOSPITAL ENCOUNTER (OUTPATIENT)
Facility: HOSPITAL | Age: 85
Discharge: HOME | End: 2025-06-05
Attending: ORTHOPAEDIC SURGERY
Payer: MEDICARE

## 2025-06-02 ENCOUNTER — HOSPITAL ENCOUNTER (OUTPATIENT)
Facility: HOSPITAL | Age: 85
Setting detail: RECURRING SERIES
Discharge: HOME | End: 2025-06-05
Attending: ORTHOPAEDIC SURGERY

## 2025-06-02 DIAGNOSIS — M54.16 LUMBAR RADICULITIS: ICD-10-CM

## 2025-06-02 DIAGNOSIS — M48.062 SPINAL STENOSIS OF LUMBAR REGION WITH NEUROGENIC CLAUDICATION: ICD-10-CM

## 2025-06-02 PROCEDURE — 72148 MRI LUMBAR SPINE W/O DYE: CPT

## 2025-06-03 ENCOUNTER — ANESTHESIA EVENT (OUTPATIENT)
Facility: HOSPITAL | Age: 85
End: 2025-06-03
Payer: MEDICARE

## 2025-06-04 ENCOUNTER — HOSPITAL ENCOUNTER (OUTPATIENT)
Facility: HOSPITAL | Age: 85
Setting detail: OUTPATIENT SURGERY
Discharge: HOME | End: 2025-06-04
Attending: STUDENT IN AN ORGANIZED HEALTH CARE EDUCATION/TRAINING PROGRAM | Admitting: STUDENT IN AN ORGANIZED HEALTH CARE EDUCATION/TRAINING PROGRAM
Payer: MEDICARE

## 2025-06-04 ENCOUNTER — APPOINTMENT (OUTPATIENT)
Facility: HOSPITAL | Age: 85
End: 2025-06-04
Attending: STUDENT IN AN ORGANIZED HEALTH CARE EDUCATION/TRAINING PROGRAM
Payer: MEDICARE

## 2025-06-04 ENCOUNTER — ANESTHESIA (OUTPATIENT)
Facility: HOSPITAL | Age: 85
End: 2025-06-04
Payer: MEDICARE

## 2025-06-04 VITALS
DIASTOLIC BLOOD PRESSURE: 64 MMHG | SYSTOLIC BLOOD PRESSURE: 134 MMHG | HEART RATE: 78 BPM | OXYGEN SATURATION: 95 % | TEMPERATURE: 98 F | RESPIRATION RATE: 16 BRPM

## 2025-06-04 DIAGNOSIS — C67.9 MALIGNANT NEOPLASM OF URINARY BLADDER, UNSPECIFIED SITE (HCC): ICD-10-CM

## 2025-06-04 DIAGNOSIS — N40.1 BPH WITH URINARY OBSTRUCTION: Primary | ICD-10-CM

## 2025-06-04 DIAGNOSIS — N13.8 BPH WITH URINARY OBSTRUCTION: Primary | ICD-10-CM

## 2025-06-04 LAB
ABO + RH BLD: NORMAL
BLOOD GROUP ANTIBODIES SERPL: NORMAL
GLUCOSE BLD STRIP.AUTO-MCNC: 147 MG/DL (ref 65–117)
GLUCOSE BLD STRIP.AUTO-MCNC: 163 MG/DL (ref 65–117)
SERVICE CMNT-IMP: ABNORMAL
SERVICE CMNT-IMP: ABNORMAL
SPECIMEN EXP DATE BLD: NORMAL

## 2025-06-04 PROCEDURE — 7100000000 HC PACU RECOVERY - FIRST 15 MIN: Performed by: STUDENT IN AN ORGANIZED HEALTH CARE EDUCATION/TRAINING PROGRAM

## 2025-06-04 PROCEDURE — 6370000000 HC RX 637 (ALT 250 FOR IP): Performed by: ANESTHESIOLOGY

## 2025-06-04 PROCEDURE — 88305 TISSUE EXAM BY PATHOLOGIST: CPT

## 2025-06-04 PROCEDURE — C1758 CATHETER, URETERAL: HCPCS | Performed by: STUDENT IN AN ORGANIZED HEALTH CARE EDUCATION/TRAINING PROGRAM

## 2025-06-04 PROCEDURE — 3600000004 HC SURGERY LEVEL 4 BASE: Performed by: STUDENT IN AN ORGANIZED HEALTH CARE EDUCATION/TRAINING PROGRAM

## 2025-06-04 PROCEDURE — 3600000014 HC SURGERY LEVEL 4 ADDTL 15MIN: Performed by: STUDENT IN AN ORGANIZED HEALTH CARE EDUCATION/TRAINING PROGRAM

## 2025-06-04 PROCEDURE — 2500000003 HC RX 250 WO HCPCS: Performed by: NURSE ANESTHETIST, CERTIFIED REGISTERED

## 2025-06-04 PROCEDURE — 82962 GLUCOSE BLOOD TEST: CPT

## 2025-06-04 PROCEDURE — C2617 STENT, NON-COR, TEM W/O DEL: HCPCS | Performed by: STUDENT IN AN ORGANIZED HEALTH CARE EDUCATION/TRAINING PROGRAM

## 2025-06-04 PROCEDURE — 6360000004 HC RX CONTRAST MEDICATION: Performed by: STUDENT IN AN ORGANIZED HEALTH CARE EDUCATION/TRAINING PROGRAM

## 2025-06-04 PROCEDURE — 6360000002 HC RX W HCPCS: Performed by: STUDENT IN AN ORGANIZED HEALTH CARE EDUCATION/TRAINING PROGRAM

## 2025-06-04 PROCEDURE — 7100000001 HC PACU RECOVERY - ADDTL 15 MIN: Performed by: STUDENT IN AN ORGANIZED HEALTH CARE EDUCATION/TRAINING PROGRAM

## 2025-06-04 PROCEDURE — 2580000003 HC RX 258: Performed by: ANESTHESIOLOGY

## 2025-06-04 PROCEDURE — 2580000003 HC RX 258: Performed by: NURSE ANESTHETIST, CERTIFIED REGISTERED

## 2025-06-04 PROCEDURE — 3700000000 HC ANESTHESIA ATTENDED CARE: Performed by: STUDENT IN AN ORGANIZED HEALTH CARE EDUCATION/TRAINING PROGRAM

## 2025-06-04 PROCEDURE — 2720000010 HC SURG SUPPLY STERILE: Performed by: STUDENT IN AN ORGANIZED HEALTH CARE EDUCATION/TRAINING PROGRAM

## 2025-06-04 PROCEDURE — 86900 BLOOD TYPING SEROLOGIC ABO: CPT

## 2025-06-04 PROCEDURE — 2709999900 HC NON-CHARGEABLE SUPPLY: Performed by: STUDENT IN AN ORGANIZED HEALTH CARE EDUCATION/TRAINING PROGRAM

## 2025-06-04 PROCEDURE — C1769 GUIDE WIRE: HCPCS | Performed by: STUDENT IN AN ORGANIZED HEALTH CARE EDUCATION/TRAINING PROGRAM

## 2025-06-04 PROCEDURE — 2500000003 HC RX 250 WO HCPCS: Performed by: STUDENT IN AN ORGANIZED HEALTH CARE EDUCATION/TRAINING PROGRAM

## 2025-06-04 PROCEDURE — 86901 BLOOD TYPING SEROLOGIC RH(D): CPT

## 2025-06-04 PROCEDURE — 86850 RBC ANTIBODY SCREEN: CPT

## 2025-06-04 PROCEDURE — 7100000010 HC PHASE II RECOVERY - FIRST 15 MIN: Performed by: STUDENT IN AN ORGANIZED HEALTH CARE EDUCATION/TRAINING PROGRAM

## 2025-06-04 PROCEDURE — 6360000002 HC RX W HCPCS: Performed by: NURSE ANESTHETIST, CERTIFIED REGISTERED

## 2025-06-04 PROCEDURE — 3700000001 HC ADD 15 MINUTES (ANESTHESIA): Performed by: STUDENT IN AN ORGANIZED HEALTH CARE EDUCATION/TRAINING PROGRAM

## 2025-06-04 RX ORDER — ONDANSETRON 2 MG/ML
INJECTION INTRAMUSCULAR; INTRAVENOUS
Status: DISCONTINUED | OUTPATIENT
Start: 2025-06-04 | End: 2025-06-04 | Stop reason: SDUPTHER

## 2025-06-04 RX ORDER — FENTANYL CITRATE 50 UG/ML
100 INJECTION, SOLUTION INTRAMUSCULAR; INTRAVENOUS
Status: DISCONTINUED | OUTPATIENT
Start: 2025-06-04 | End: 2025-06-04 | Stop reason: HOSPADM

## 2025-06-04 RX ORDER — OXYCODONE HYDROCHLORIDE 5 MG/1
5 TABLET ORAL
Status: DISCONTINUED | OUTPATIENT
Start: 2025-06-04 | End: 2025-06-04 | Stop reason: HOSPADM

## 2025-06-04 RX ORDER — FENTANYL CITRATE 50 UG/ML
25 INJECTION, SOLUTION INTRAMUSCULAR; INTRAVENOUS EVERY 5 MIN PRN
Status: DISCONTINUED | OUTPATIENT
Start: 2025-06-04 | End: 2025-06-04 | Stop reason: HOSPADM

## 2025-06-04 RX ORDER — NALOXONE HYDROCHLORIDE 0.4 MG/ML
INJECTION, SOLUTION INTRAMUSCULAR; INTRAVENOUS; SUBCUTANEOUS PRN
Status: DISCONTINUED | OUTPATIENT
Start: 2025-06-04 | End: 2025-06-04 | Stop reason: HOSPADM

## 2025-06-04 RX ORDER — SODIUM CHLORIDE 0.9 % (FLUSH) 0.9 %
5-40 SYRINGE (ML) INJECTION EVERY 12 HOURS SCHEDULED
Status: DISCONTINUED | OUTPATIENT
Start: 2025-06-04 | End: 2025-06-04 | Stop reason: HOSPADM

## 2025-06-04 RX ORDER — SODIUM CHLORIDE 9 MG/ML
INJECTION, SOLUTION INTRAVENOUS PRN
Status: DISCONTINUED | OUTPATIENT
Start: 2025-06-04 | End: 2025-06-04 | Stop reason: HOSPADM

## 2025-06-04 RX ORDER — SODIUM CHLORIDE 0.9 % (FLUSH) 0.9 %
5-40 SYRINGE (ML) INJECTION PRN
Status: DISCONTINUED | OUTPATIENT
Start: 2025-06-04 | End: 2025-06-04 | Stop reason: HOSPADM

## 2025-06-04 RX ORDER — LIDOCAINE HYDROCHLORIDE 10 MG/ML
1 INJECTION, SOLUTION EPIDURAL; INFILTRATION; INTRACAUDAL; PERINEURAL
Status: DISCONTINUED | OUTPATIENT
Start: 2025-06-04 | End: 2025-06-04 | Stop reason: HOSPADM

## 2025-06-04 RX ORDER — DIPHENHYDRAMINE HYDROCHLORIDE 50 MG/ML
12.5 INJECTION, SOLUTION INTRAMUSCULAR; INTRAVENOUS
Status: DISCONTINUED | OUTPATIENT
Start: 2025-06-04 | End: 2025-06-04 | Stop reason: HOSPADM

## 2025-06-04 RX ORDER — EPHEDRINE SULFATE 50 MG/ML
INJECTION INTRAVENOUS
Status: DISCONTINUED | OUTPATIENT
Start: 2025-06-04 | End: 2025-06-04 | Stop reason: SDUPTHER

## 2025-06-04 RX ORDER — LIDOCAINE HYDROCHLORIDE 20 MG/ML
INJECTION, SOLUTION EPIDURAL; INFILTRATION; INTRACAUDAL; PERINEURAL
Status: DISCONTINUED | OUTPATIENT
Start: 2025-06-04 | End: 2025-06-04 | Stop reason: SDUPTHER

## 2025-06-04 RX ORDER — MIDAZOLAM HYDROCHLORIDE 2 MG/2ML
2 INJECTION, SOLUTION INTRAMUSCULAR; INTRAVENOUS AS NEEDED
Status: DISCONTINUED | OUTPATIENT
Start: 2025-06-04 | End: 2025-06-04 | Stop reason: HOSPADM

## 2025-06-04 RX ORDER — TRAMADOL HYDROCHLORIDE 50 MG/1
50 TABLET ORAL EVERY 6 HOURS PRN
Qty: 12 TABLET | Refills: 0 | Status: SHIPPED | OUTPATIENT
Start: 2025-06-04 | End: 2025-06-07

## 2025-06-04 RX ORDER — SUCCINYLCHOLINE/SOD CL,ISO/PF 200MG/10ML
SYRINGE (ML) INTRAVENOUS
Status: DISCONTINUED | OUTPATIENT
Start: 2025-06-04 | End: 2025-06-04 | Stop reason: SDUPTHER

## 2025-06-04 RX ORDER — PROCHLORPERAZINE EDISYLATE 5 MG/ML
5 INJECTION INTRAMUSCULAR; INTRAVENOUS
Status: DISCONTINUED | OUTPATIENT
Start: 2025-06-04 | End: 2025-06-04 | Stop reason: HOSPADM

## 2025-06-04 RX ORDER — FENTANYL CITRATE 50 UG/ML
INJECTION, SOLUTION INTRAMUSCULAR; INTRAVENOUS
Status: DISCONTINUED | OUTPATIENT
Start: 2025-06-04 | End: 2025-06-04 | Stop reason: SDUPTHER

## 2025-06-04 RX ORDER — ACETAMINOPHEN 500 MG
1000 TABLET ORAL ONCE
Status: COMPLETED | OUTPATIENT
Start: 2025-06-04 | End: 2025-06-04

## 2025-06-04 RX ORDER — HYDRALAZINE HYDROCHLORIDE 20 MG/ML
10 INJECTION INTRAMUSCULAR; INTRAVENOUS
Status: DISCONTINUED | OUTPATIENT
Start: 2025-06-04 | End: 2025-06-04 | Stop reason: HOSPADM

## 2025-06-04 RX ORDER — ONDANSETRON 2 MG/ML
4 INJECTION INTRAMUSCULAR; INTRAVENOUS
Status: DISCONTINUED | OUTPATIENT
Start: 2025-06-04 | End: 2025-06-04 | Stop reason: HOSPADM

## 2025-06-04 RX ORDER — ROCURONIUM BROMIDE 10 MG/ML
INJECTION, SOLUTION INTRAVENOUS
Status: DISCONTINUED | OUTPATIENT
Start: 2025-06-04 | End: 2025-06-04 | Stop reason: SDUPTHER

## 2025-06-04 RX ORDER — IPRATROPIUM BROMIDE AND ALBUTEROL SULFATE 2.5; .5 MG/3ML; MG/3ML
1 SOLUTION RESPIRATORY (INHALATION)
Status: DISCONTINUED | OUTPATIENT
Start: 2025-06-04 | End: 2025-06-04 | Stop reason: HOSPADM

## 2025-06-04 RX ORDER — LORAZEPAM 2 MG/ML
0.5 INJECTION INTRAMUSCULAR
Status: DISCONTINUED | OUTPATIENT
Start: 2025-06-04 | End: 2025-06-04 | Stop reason: HOSPADM

## 2025-06-04 RX ORDER — HYDROMORPHONE HYDROCHLORIDE 1 MG/ML
0.5 INJECTION, SOLUTION INTRAMUSCULAR; INTRAVENOUS; SUBCUTANEOUS EVERY 5 MIN PRN
Status: DISCONTINUED | OUTPATIENT
Start: 2025-06-04 | End: 2025-06-04 | Stop reason: HOSPADM

## 2025-06-04 RX ORDER — SODIUM CHLORIDE, SODIUM LACTATE, POTASSIUM CHLORIDE, CALCIUM CHLORIDE 600; 310; 30; 20 MG/100ML; MG/100ML; MG/100ML; MG/100ML
INJECTION, SOLUTION INTRAVENOUS CONTINUOUS
Status: DISCONTINUED | OUTPATIENT
Start: 2025-06-04 | End: 2025-06-04 | Stop reason: HOSPADM

## 2025-06-04 RX ADMIN — ONDANSETRON 4 MG: 2 INJECTION, SOLUTION INTRAMUSCULAR; INTRAVENOUS at 10:35

## 2025-06-04 RX ADMIN — PROPOFOL 150 MG: 10 INJECTION, EMULSION INTRAVENOUS at 09:36

## 2025-06-04 RX ADMIN — WATER 2000 MG: 1 INJECTION INTRAMUSCULAR; INTRAVENOUS; SUBCUTANEOUS at 09:45

## 2025-06-04 RX ADMIN — Medication 120 MG: at 09:36

## 2025-06-04 RX ADMIN — FENTANYL CITRATE 50 MCG: 50 INJECTION INTRAMUSCULAR; INTRAVENOUS at 09:49

## 2025-06-04 RX ADMIN — PHENYLEPHRINE HYDROCHLORIDE 20 MCG/MIN: 10 INJECTION INTRAVENOUS at 09:57

## 2025-06-04 RX ADMIN — PROPOFOL 20 MG: 10 INJECTION, EMULSION INTRAVENOUS at 09:49

## 2025-06-04 RX ADMIN — EPHEDRINE SULFATE 10 MG: 50 INJECTION INTRAVENOUS at 09:57

## 2025-06-04 RX ADMIN — ACETAMINOPHEN 1000 MG: 500 TABLET ORAL at 08:22

## 2025-06-04 RX ADMIN — SODIUM CHLORIDE, SODIUM LACTATE, POTASSIUM CHLORIDE, AND CALCIUM CHLORIDE: .6; .31; .03; .02 INJECTION, SOLUTION INTRAVENOUS at 08:33

## 2025-06-04 RX ADMIN — LIDOCAINE HYDROCHLORIDE 100 MG: 20 INJECTION, SOLUTION EPIDURAL; INFILTRATION; INTRACAUDAL; PERINEURAL at 09:36

## 2025-06-04 RX ADMIN — EPHEDRINE SULFATE 10 MG: 50 INJECTION INTRAVENOUS at 10:05

## 2025-06-04 RX ADMIN — ROCURONIUM BROMIDE 5 MG: 10 INJECTION, SOLUTION INTRAVENOUS at 09:36

## 2025-06-04 RX ADMIN — FENTANYL CITRATE 50 MCG: 50 INJECTION INTRAMUSCULAR; INTRAVENOUS at 09:36

## 2025-06-04 ASSESSMENT — PAIN DESCRIPTION - DESCRIPTORS: DESCRIPTORS: ACHING

## 2025-06-04 ASSESSMENT — PAIN - FUNCTIONAL ASSESSMENT: PAIN_FUNCTIONAL_ASSESSMENT: 0-10

## 2025-06-04 NOTE — PERIOP NOTE
URETERAL STENT PLACED ON LEFT  6F X 26CM  LOT [id number]  EXP 2027-12-17  STRINGS LEFT OUT    LITETOUCH    20

## 2025-06-04 NOTE — H&P
HISTORY AND PHYSICAL             Date: 6/4/2025        Patient Name: Jeffrey Driver     YOB: 1940      Age:  85 y.o.    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 Last underwent turbt 7/2024 and b/l ureteroscopy with laser ablation of L ureteral tumor 9/2024.  He presents today for cystoscopy, possible turbt, left ureteroscopy with possible laser ablation of tumor and stent placement    Past Medical History     Past Medical History:   Diagnosis Date    Arthritis     Bladder cancer (HCC)     Calculus of kidney     Cerebral artery occlusion with cerebral infarction (HCC) 10/2016    3 STROKES; LAST TIA 12/2024    Cerebrovascular disease     Chronic back pain     Diabetes mellitus (HCC)     Fracture of right ankle 5/26/2010    GERD (gastroesophageal reflux disease)     HBP (high blood pressure) 5/26/2010    Headache     History of bladder cancer     2020  Resection    Occlusion of left vertebral artery 2016    Primary urethral papillary carcinoma (HCC) 2014    Dr Watkins    Seizure-like activity ([facility]) 12/09/2024    PT SAID HE HAD TIA    Skin cancer of face     Stenosis of right vertebral artery     Ureteral calculus 5/26/2010        Past Surgical History     Past Surgical History:   Procedure Laterality Date    COLONOSCOPY  02; 2/07; 2012    polyp 5 yr    EYE SURGERY Bilateral     CATARACTS    TONSILLECTOMY      UROLOGICAL SURGERY  06/06/2022    bladder    UROLOGICAL SURGERY  04/07/2020    transurethral resection of recurrent bladder tumor Dr. Rashaad Watkins    UROLOGICAL SURGERY  2015    bladder    UROLOGICAL SURGERY  2014    bladder        Medications Prior to Admission     Prior to Admission medications    Medication Sig Start Date End Date Taking? Authorizing Provider   famotidine (PEPCID) 20 MG tablet

## 2025-06-04 NOTE — PERIOP NOTE
Patient awake and alert, ambulated to chair with steady gait.  Patient denies pain or nausea.  Patient tolerated PO soda.     Discharge instructions reviewed with patient and family. Questions answered and understanding verbalized.    Patient to exit in wheelchair, transferred safely into the car and was released to the care of his family.

## 2025-06-04 NOTE — OP NOTE
DATE OF PROCEDURE: 6/4/2025    SURGEON: Tony Carson MD    ASSISTANT: none    PREOPERATIVE DIAGNOSES:   1.  History of bladder cancer  2.  History of left ureteral tumor      POSTOPERATIVE DIAGNOSES:   1.  History of bladder cancer  2.  Left ureteral tumor    PROCEDURES PERFORMED:   1.  Cystourethroscopy  2.  Bladder biopsy with fulguration of base  3.  Left ureteroscopy with laser ablation of proximal ureteral tumor  4.  Left retrograde pyelogram  5.  Left ureteral stent placement  6.  Intraoperative fluoroscopy interpretation less than 1 hour    PERIOPERATIVE ANTIBIOTICS: Ancef    ANAESTHESIA: General    INDICATIONS:This patient has a history of recurrent TA low-grade urothelial carcinoma of the bladder since 2013, he was also diagnosed with a left proximal ureteral tumor in 2024 status post laser ablation.  He returns for cystoscopy and ureteroscopic surveillance.. After discussion of management options the patient elected to proceed with the procedures listed above.     PROCEDURE NARRATIVE: The patient signed consent for the operation prior to being brought back to the operating room. Upon arrival on the operating room, a time out was performed for the patient's safety and the correct patient, procedure, site and side were identified. The patient was placed in a supine position and anesthesia was administered.  The patient was placed in a dorsal lithotomy position. All pressure points were appropriately padded in order to prevent compartment syndrome and neuropathy. The patient was prepped and draped in the usual sterile fashion.     Cystourethroscopy was performed the 21 Mongolian sheath and 30 degree lens.  The urethra was notable for a wide caliber bulbar urethral stricture which was easily passed with the scope without resistance.  Prostate with mild bilobar enlargement.  Bladder surveyed in its entirety.  There was some patchy erythema just posterior to the left ureteral orifice which was sessile and

## 2025-06-04 NOTE — ANESTHESIA PRE PROCEDURE
Department of Anesthesiology  Preprocedure Note       Name:  Jeffrey Driver   Age:  85 y.o.  :  1940                                          MRN:  [id number]         Date:  2025      Surgeon: Surgeon(s):  Tony Carson MD    Procedure: Procedure(s):  LEFT DIAGNOSTIC URETEROSCOPY, POSSIBLE  LASER ABLATION OF TUMOR, LEFT STENT, CYSTOSCOPY, POSSIBLE TRANSURETHRAL RESECTION OF BLADDER TUMOR WITH LIGHT TOUCH LASER    Medications prior to admission:   Prior to Admission medications    Medication Sig Start Date End Date Taking? Authorizing Provider   famotidine (PEPCID) 20 MG tablet TAKE 1 TABLET BY MOUTH EVERYDAY AT BEDTIME 25  Yes Filiberto Ignacio MD   glimepiride (AMARYL) 4 MG tablet TAKE 1 TABLET BY MOUTH EVERY DAY IN THE MORNING 25  Yes Tg Fry APRN - NP   pravastatin (PRAVACHOL) 10 MG tablet TAKE 1 TABLET BY MOUTH EVERY DAY AT NIGHT 25  Yes Tg Fry APRN - NP   ondansetron (ZOFRAN-ODT) 4 MG disintegrating tablet DISSOLVE 1 TABLET IN MOUTH EVERY 8 HOURS AS NEEDED FOR NAUSEA 3/30/25  Yes Tg Fry APRN - NP   lansoprazole (PREVACID) 30 MG delayed release capsule TAKE 1 CAP BY MOUTH DAILY (BEFORE BREAKFAST). 25  Yes Tg Fry APRN - NP   amLODIPine (NORVASC) 10 MG tablet Take 1 tablet by mouth nightly   Yes Provider, MD Adia   metFORMIN (GLUCOPHAGE-XR) 500 MG extended release tablet TAKE 1 TABLET BY MOUTH TWICE A DAY WITH FOOD 25  Yes Tg Fry APRN - NP   vitamin D (CHOLECALCIFEROL) 25 MCG (1000 UT) TABS tablet Take by mouth daily   Yes Automatic Reconciliation, Ar   diclofenac sodium (VOLTAREN) 1 % GEL Apply topically 4 times daily 20  Yes Automatic Reconciliation, Ar   finasteride (PROSCAR) 5 MG tablet TAKE 1 TABLET BY MOUTH EVERY DAY 20  Yes Automatic Reconciliation, Ar   Probiotic Product (ACIDOPHILUS PROBIOTIC) CAPS capsule Take by mouth   Yes Automatic Reconciliation, Ar   clopidogrel (PLAVIX) 75

## 2025-06-04 NOTE — ANESTHESIA POSTPROCEDURE EVALUATION
Post-Anesthesia Evaluation and Assessment    Patient: Jeffrey Driver MRN: [id number]  SSN: [id number]    YOB: 1940  Age: 85 y.o.  Sex: male      I have evaluated the patient and they are stable and ready for discharge from the PACU.     Cardiovascular Function/Vital Signs  Visit Vitals  BP (!) 129/59   Pulse 80   Temp 97.1 °F (36.2 °C) (Axillary)   Resp 17   SpO2 94%       Patient is status post General anesthesia for Procedure(s):  LEFT DIAGNOSTIC URETEROSCOPY, POSSIBLE  LASER ABLATION OF TUMOR, LEFT STENT, CYSTOSCOPY, BLADDER BIOPSY WITH FULGURATION WITH LIGHT TOUCH LASER.    Nausea/Vomiting: None    Postoperative hydration reviewed and adequate.    Pain:      Managed    Neurological Status:       At baseline    Mental Status, Level of Consciousness: Alert and  oriented to person, place, and time    Pulmonary Status:       Adequate oxygenation and airway patent    Complications related to anesthesia: None    Post-anesthesia assessment completed. No concerns    Signed By: Atilio Lobo MD     June 4, 2025

## 2025-06-04 NOTE — FLOWSHEET NOTE
06/04/25 1013   Family Communication    Relationship to Patient Daughter    Phone Number MARIBEL KNIGHT [phone]   Family/Significant Other Update Called;Updated   Delivery Origin Nurse   Message Disposition Family present - message delivered   Update Given Yes   Family Communication   Family Update Message Procedure started;Surgeon working;Patient stable

## 2025-06-09 DIAGNOSIS — E27.8 LEFT ADRENAL MASS: Primary | ICD-10-CM

## 2025-06-10 PROBLEM — D49.59: Status: ACTIVE | Noted: 2025-06-10

## 2025-06-19 ENCOUNTER — TELEPHONE (OUTPATIENT)
Age: 85
End: 2025-06-19

## 2025-06-19 DIAGNOSIS — K21.9 GASTROESOPHAGEAL REFLUX DISEASE WITHOUT ESOPHAGITIS: ICD-10-CM

## 2025-06-19 NOTE — DISCHARGE INSTRUCTIONS
You did not have a new stroke.  You should have an echocardiogram as an outpatient.  The EEG was done and the final result is pending.   Adult

## 2025-06-20 NOTE — TELEPHONE ENCOUNTER
PCP: Tg Fry APRN - NP    Last appt: 3/24/2025       Future Appointments   Date Time Provider Department Center   7/10/2025  9:00 AM Tg Fry APRN - NP [facility] DEP       Requested Prescriptions     Pending Prescriptions Disp Refills    famotidine (PEPCID) 20 MG tablet [Pharmacy Med Name: FAMOTIDINE 20 MG TABLET] 90 tablet 1     Sig: TAKE 1 TABLET BY MOUTH EVERYDAY AT BEDTIME       Prior labs and Blood pressures:  BP Readings from Last 3 Encounters:   06/04/25 134/64   05/21/25 137/71   03/24/25 (!) 138/56     Lab Results   Component Value Date/Time     05/21/2025 02:28 PM    K 4.0 05/21/2025 02:28 PM     05/21/2025 02:28 PM    CO2 27 05/21/2025 02:28 PM    BUN 17 05/21/2025 02:28 PM    GFRAA >60 06/27/2022 09:00 AM     No results found for: \"HBA1C\", \"[id number]\"  Lab Results   Component Value Date/Time    CHOL 113 12/07/2024 05:47 AM    HDL 42 12/07/2024 05:47 AM    LDL 52.6 12/07/2024 05:47 AM    LDL 53.4 03/07/2024 09:43 AM    VLDL 18.4 12/07/2024 05:47 AM     No results found for: \"VITD3\"    Lab Results   Component Value Date/Time    TSH 1.28 12/07/2024 05:47 AM

## 2025-06-22 RX ORDER — FAMOTIDINE 20 MG/1
20 TABLET, FILM COATED ORAL
Qty: 90 TABLET | Refills: 1 | Status: SHIPPED | OUTPATIENT
Start: 2025-06-22

## 2025-06-24 DIAGNOSIS — E27.8 MASS OF LEFT ADRENAL GLAND: Primary | ICD-10-CM

## 2025-06-24 DIAGNOSIS — E27.8 OTHER SPECIFIED DISORDERS OF ADRENAL GLAND: ICD-10-CM

## 2025-06-24 DIAGNOSIS — R93.5 ABNORMAL FINDINGS ON DIAGNOSTIC IMAGING OF OTHER ABDOMINAL REGIONS, INCLUDING RETROPERITONEUM: ICD-10-CM

## 2025-06-28 DIAGNOSIS — E11.9 TYPE 2 DIABETES MELLITUS WITHOUT COMPLICATIONS (HCC): ICD-10-CM

## 2025-06-30 RX ORDER — METFORMIN HYDROCHLORIDE 500 MG/1
500 TABLET, EXTENDED RELEASE ORAL 2 TIMES DAILY WITH MEALS
Qty: 180 TABLET | Refills: 0 | Status: SHIPPED | OUTPATIENT
Start: 2025-06-30

## 2025-06-30 NOTE — TELEPHONE ENCOUNTER
PCP: Tg Fry APRN - NP      Future Appointments   Date Time Provider Department Center   7/3/2025  9:15 AM SPT CT 1 [facility] [place] [facility]   7/10/2025  9:00 AM Tg Fry APRN - NP [facility] DEP       Requested Prescriptions     Pending Prescriptions Disp Refills    metFORMIN (GLUCOPHAGE-XR) 500 MG extended release tablet [Pharmacy Med Name: METFORMIN HCL  MG TABLET] 180 tablet 1     Sig: TAKE 1 TABLET BY MOUTH TWICE A DAY WITH FOOD       Prior labs and Blood pressures:  BP Readings from Last 3 Encounters:   06/04/25 134/64   05/21/25 137/71   03/24/25 (!) 138/56     Lab Results   Component Value Date/Time     05/21/2025 02:28 PM    K 4.0 05/21/2025 02:28 PM     05/21/2025 02:28 PM    CO2 27 05/21/2025 02:28 PM    BUN 17 05/21/2025 02:28 PM    GFRAA >60 06/27/2022 09:00 AM     No results found for: \"HBA1C\", \"[id number]\"  Lab Results   Component Value Date/Time    CHOL 113 12/07/2024 05:47 AM    HDL 42 12/07/2024 05:47 AM    LDL 52.6 12/07/2024 05:47 AM    LDL 53.4 03/07/2024 09:43 AM    VLDL 18.4 12/07/2024 05:47 AM     No results found for: \"VITD3\"    Lab Results   Component Value Date/Time    TSH 1.28 12/07/2024 05:47 AM

## 2025-07-03 ENCOUNTER — HOSPITAL ENCOUNTER (OUTPATIENT)
Facility: HOSPITAL | Age: 85
Discharge: HOME | End: 2025-07-03
Payer: MEDICARE

## 2025-07-03 DIAGNOSIS — R93.5 ABNORMAL FINDINGS ON DIAGNOSTIC IMAGING OF OTHER ABDOMINAL REGIONS, INCLUDING RETROPERITONEUM: ICD-10-CM

## 2025-07-03 PROCEDURE — 74150 CT ABDOMEN W/O CONTRAST: CPT

## 2025-07-06 RX ORDER — CLOPIDOGREL BISULFATE 75 MG/1
75 TABLET ORAL DAILY
Qty: 90 TABLET | Refills: 0 | Status: SHIPPED | OUTPATIENT
Start: 2025-07-06

## 2025-07-10 ENCOUNTER — OFFICE VISIT (OUTPATIENT)
Age: 85
End: 2025-07-10
Payer: MEDICARE

## 2025-07-10 VITALS
DIASTOLIC BLOOD PRESSURE: 62 MMHG | WEIGHT: 197.2 LBS | RESPIRATION RATE: 18 BRPM | SYSTOLIC BLOOD PRESSURE: 120 MMHG | HEIGHT: 69 IN | BODY MASS INDEX: 29.21 KG/M2 | OXYGEN SATURATION: 96 % | TEMPERATURE: 97.8 F | HEART RATE: 73 BPM

## 2025-07-10 DIAGNOSIS — R06.09 EXERTIONAL DYSPNEA: ICD-10-CM

## 2025-07-10 DIAGNOSIS — E78.00 HYPERCHOLESTEROLEMIA: ICD-10-CM

## 2025-07-10 DIAGNOSIS — K21.9 GASTROESOPHAGEAL REFLUX DISEASE WITHOUT ESOPHAGITIS: ICD-10-CM

## 2025-07-10 DIAGNOSIS — I65.09 VERTEBRAL ARTERY OCCLUSION, UNSPECIFIED LATERALITY: ICD-10-CM

## 2025-07-10 DIAGNOSIS — E11.9 TYPE 2 DIABETES MELLITUS WITHOUT COMPLICATION, WITHOUT LONG-TERM CURRENT USE OF INSULIN (HCC): Primary | ICD-10-CM

## 2025-07-10 DIAGNOSIS — G45.9 TIA (TRANSIENT ISCHEMIC ATTACK): ICD-10-CM

## 2025-07-10 DIAGNOSIS — C68.0 PRIMARY URETHRAL PAPILLARY CARCINOMA (HCC): ICD-10-CM

## 2025-07-10 DIAGNOSIS — I10 BENIGN ESSENTIAL HTN: ICD-10-CM

## 2025-07-10 DIAGNOSIS — M51.362 DEGENERATION OF INTERVERTEBRAL DISC OF LUMBAR REGION WITH DISCOGENIC BACK PAIN AND LOWER EXTREMITY PAIN: ICD-10-CM

## 2025-07-10 DIAGNOSIS — E27.9 LESION OF ADRENAL GLAND: ICD-10-CM

## 2025-07-10 PROBLEM — I63.9 ACUTE CVA (CEREBROVASCULAR ACCIDENT) (HCC): Status: RESOLVED | Noted: 2024-12-06 | Resolved: 2025-07-10

## 2025-07-10 PROCEDURE — 1036F TOBACCO NON-USER: CPT | Performed by: NURSE PRACTITIONER

## 2025-07-10 PROCEDURE — 1125F AMNT PAIN NOTED PAIN PRSNT: CPT | Performed by: NURSE PRACTITIONER

## 2025-07-10 PROCEDURE — 1160F RVW MEDS BY RX/DR IN RCRD: CPT | Performed by: NURSE PRACTITIONER

## 2025-07-10 PROCEDURE — 99215 OFFICE O/P EST HI 40 MIN: CPT | Performed by: NURSE PRACTITIONER

## 2025-07-10 PROCEDURE — 3051F HG A1C>EQUAL 7.0%<8.0%: CPT | Performed by: NURSE PRACTITIONER

## 2025-07-10 PROCEDURE — 1159F MED LIST DOCD IN RCRD: CPT | Performed by: NURSE PRACTITIONER

## 2025-07-10 PROCEDURE — G8419 CALC BMI OUT NRM PARAM NOF/U: HCPCS | Performed by: NURSE PRACTITIONER

## 2025-07-10 PROCEDURE — 1123F ACP DISCUSS/DSCN MKR DOCD: CPT | Performed by: NURSE PRACTITIONER

## 2025-07-10 PROCEDURE — G8427 DOCREV CUR MEDS BY ELIG CLIN: HCPCS | Performed by: NURSE PRACTITIONER

## 2025-07-10 ASSESSMENT — PATIENT HEALTH QUESTIONNAIRE - PHQ9
SUM OF ALL RESPONSES TO PHQ QUESTIONS 1-9: 0
SUM OF ALL RESPONSES TO PHQ QUESTIONS 1-9: 0
2. FEELING DOWN, DEPRESSED OR HOPELESS: NOT AT ALL
SUM OF ALL RESPONSES TO PHQ QUESTIONS 1-9: 0
SUM OF ALL RESPONSES TO PHQ QUESTIONS 1-9: 0
1. LITTLE INTEREST OR PLEASURE IN DOING THINGS: NOT AT ALL

## 2025-07-10 ASSESSMENT — ENCOUNTER SYMPTOMS
BLOOD IN STOOL: 0
ABDOMINAL PAIN: 0
CHEST TIGHTNESS: 0
BACK PAIN: 1
WHEEZING: 0
SHORTNESS OF BREATH: 1

## 2025-07-10 NOTE — PROGRESS NOTES
Chief Complaint   Patient presents with    Diabetes         \"Have you been to the ER, urgent care clinic since your last visit?  Hospitalized since your last visit?\"    Yes 4 month [place] Dx: Swollen Legs     “Have you seen or consulted any other health care providers outside of [facility] since your last visit?”    NO          Click Here for Release of Records Request           7/10/2025     9:05 AM   PHQ-9    Little interest or pleasure in doing things 0   Feeling down, depressed, or hopeless 0   PHQ-2 Score 0   PHQ-9 Total Score 0           Financial Resource Strain: Low Risk  (3/4/2024)    Overall Financial Resource Strain (CARDIA)     Difficulty of Paying Living Expenses: Not hard at all      Food Insecurity: No Food Insecurity (3/22/2025)    Hunger Vital Sign     Worried About Running Out of Food in the Last Year: Never true     Ran Out of Food in the Last Year: Never true          Health Maintenance Due   Topic Date Due    DTaP/Tdap/Td vaccine (1 - Tdap) Never done    Respiratory Syncytial Virus (RSV) Pregnant or age 60 yrs+ (1 - 1-dose 75+ series) Never done    COVID-19 Vaccine (4 - 2024-25 season) 09/01/2024    Annual Wellness Visit (Medicare)  05/14/2025        
  Musculoskeletal:  Positive for arthralgias and back pain.   Neurological:  Positive for weakness and headaches (intermittent). Negative for dizziness.   All other systems reviewed and are negative.        Objective:   Physical Exam  Constitutional:       General: He is not in acute distress.  Cardiovascular:      Rate and Rhythm: Normal rate and regular rhythm.      Heart sounds: Normal heart sounds.   Pulmonary:      Effort: Pulmonary effort is normal.      Breath sounds: Normal breath sounds. No rales.   Musculoskeletal:      Cervical back: Neck supple.      Right lower leg: No edema.      Left lower leg: No edema.   Lymphadenopathy:      Cervical: No cervical adenopathy.   Skin:     General: Skin is warm and dry.   Neurological:      Mental Status: He is alert.      Coordination: Coordination normal.      Gait: Gait abnormal (ambulating with cane favoring left leg).   Psychiatric:         Mood and Affect: Mood normal.           Assessment / Plan:      Assessment & Plan  Type 2 diabetes mellitus without complication, without long-term current use of insulin (HCC)     Stable with last A1c.  Continue current treatment.  Recheck labs.  Orders:    Comprehensive Metabolic Panel; Future    Hemoglobin A1C; Future    Albumin/Creatinine Ratio, Urine; Future    Hypercholesterolemia     At goal with last FLP.  Continue statin.  Fasting labs sent.  Orders:    Comprehensive Metabolic Panel; Future    Lipid Panel; Future    Benign essential HTN     Controlled.  Continue current treatment.  Orders:    Comprehensive Metabolic Panel; Future    Vertebral artery occlusion, unspecified laterality     Condition stable.  Continue current treatment.  Follow-up with neurology as scheduled.       Gastroesophageal reflux disease without esophagitis     Moderately stable on PPI and H2 blocker.  Follow-up with GI as needed symptoms progress.       Degeneration of intervertebral disc of lumbar region with discogenic back pain and lower

## 2025-07-10 NOTE — ASSESSMENT & PLAN NOTE
At goal with last FLP.  Continue statin.  Fasting labs sent.  Orders:    Comprehensive Metabolic Panel; Future    Lipid Panel; Future

## 2025-07-10 NOTE — ASSESSMENT & PLAN NOTE
Stable with last A1c.  Continue current treatment.  Recheck labs.  Orders:    Comprehensive Metabolic Panel; Future    Hemoglobin A1C; Future    Albumin/Creatinine Ratio, Urine; Future

## 2025-07-12 LAB
ALBUMIN SERPL-MCNC: 4 G/DL (ref 3.5–5)
ALBUMIN/GLOB SERPL: 1.5 (ref 1.1–2.2)
ALP SERPL-CCNC: 81 U/L (ref 45–117)
ALT SERPL-CCNC: 26 U/L (ref 12–78)
ANION GAP SERPL CALC-SCNC: 8 MMOL/L (ref 2–12)
AST SERPL-CCNC: 20 U/L (ref 15–37)
BILIRUB SERPL-MCNC: 0.5 MG/DL (ref 0.2–1)
BUN SERPL-MCNC: 17 MG/DL (ref 6–20)
BUN/CREAT SERPL: 15 (ref 12–20)
CALCIUM SERPL-MCNC: 9.2 MG/DL (ref 8.5–10.1)
CHLORIDE SERPL-SCNC: 110 MMOL/L (ref 97–108)
CHOLEST SERPL-MCNC: 108 MG/DL
CO2 SERPL-SCNC: 23 MMOL/L (ref 21–32)
CREAT SERPL-MCNC: 1.11 MG/DL (ref 0.7–1.3)
CREAT UR-MCNC: 152 MG/DL
EST. AVERAGE GLUCOSE BLD GHB EST-MCNC: 160 MG/DL
GLOBULIN SER CALC-MCNC: 2.6 G/DL (ref 2–4)
GLUCOSE SERPL-MCNC: 129 MG/DL (ref 65–100)
HBA1C MFR BLD: 7.2 % (ref 4–5.6)
HDLC SERPL-MCNC: 47 MG/DL
HDLC SERPL: 2.3 (ref 0–5)
LDLC SERPL CALC-MCNC: 48.4 MG/DL (ref 0–100)
MICROALBUMIN UR-MCNC: 4.94 MG/DL
MICROALBUMIN/CREAT UR-RTO: 33 MG/G (ref 0–30)
POTASSIUM SERPL-SCNC: 4.4 MMOL/L (ref 3.5–5.1)
PROT SERPL-MCNC: 6.6 G/DL (ref 6.4–8.2)
SODIUM SERPL-SCNC: 141 MMOL/L (ref 136–145)
TRIGL SERPL-MCNC: 63 MG/DL
VLDLC SERPL CALC-MCNC: 12.6 MG/DL

## 2025-07-20 DIAGNOSIS — E78.00 PURE HYPERCHOLESTEROLEMIA, UNSPECIFIED: ICD-10-CM

## 2025-07-21 RX ORDER — PRAVASTATIN SODIUM 10 MG
10 TABLET ORAL NIGHTLY
Qty: 90 TABLET | Refills: 1 | Status: SHIPPED | OUTPATIENT
Start: 2025-07-21

## 2025-07-21 NOTE — TELEPHONE ENCOUNTER
PCP: Tg Fry APRN - NP    Last appt: 7/10/2025   Future Appointments   Date Time Provider Department Center   7/22/2025 10:30 AM Tuyet Kumar PT [facility]   10/20/2025  9:00 AM Tg Fry APRN - NP PA[facility] DEP       Requested Prescriptions     Pending Prescriptions Disp Refills    pravastatin (PRAVACHOL) 10 MG tablet [Pharmacy Med Name: PRAVASTATIN SODIUM 10 MG TAB] 90 tablet 0     Sig: TAKE 1 TABLET BY MOUTH EVERY DAY AT NIGHT

## 2025-07-22 ENCOUNTER — HOSPITAL ENCOUNTER (OUTPATIENT)
Facility: HOSPITAL | Age: 85
Setting detail: RECURRING SERIES
Discharge: HOME | End: 2025-07-25
Attending: ORTHOPAEDIC SURGERY
Payer: MEDICARE

## 2025-07-22 PROCEDURE — 97162 PT EVAL MOD COMPLEX 30 MIN: CPT

## 2025-07-22 PROCEDURE — 97110 THERAPEUTIC EXERCISES: CPT

## 2025-07-22 NOTE — THERAPY EVALUATION
strength, coordination, balance, and proprioception to improve patient's ability to progress to PLOF and address remaining functional goals. (see flow sheet as applicable)    Details if applicable:  HEP   15     Total Total       [x]  Patient Education billed concurrently with other procedures   [x] Review HEP    [] Progressed/Changed HEP, detail:    [x] Other detail: torey/phys; PT POC; importance of performing HEP in order to maximize benefit of PT; discussed high fall risk and encouraged use of rollator or walker lolis when in community; discussed importance of avoiding sedentary behavior at home    Pain Level at end of session (0-10 scale): not captured    Plan of Care / Statement of Necessity for Physical Therapy Services     Assessment / key information:  Pt is an 85 yr old male presenting with signs and symptoms consistent with B hip OA, lumbar OA effecting mobility, ability to perform ADL's. He will benefit from PT to address problem list below.    Evaluation Complexity:  History:  MEDIUM  Complexity : 1-2 comorbidities / personal factors will impact the outcome/ POC ; Examination:  MEDIUM Complexity : 3 Standardized tests and measures addressin body structure, function, activity limitation and / or participation in recreation  ;Presentation:  MEDIUM Complexity : Evolving with changing characteristics  ;Clinical Decision Making:  MEDIUM Complexity : FOTO score of 26-74 Overall Complexity Rating: MEDIUM  Problem List: pain affecting function, decrease ROM, decrease strength, impaired gait/balance, decrease ADL/functional abilities, decrease activity tolerance, decrease flexibility/joint mobility, and decrease transfer abilities    Treatment Plan may include any combination of the followin Therapeutic Exercise, 68842 Neuromuscular Re-Education, 63685 Manual Therapy, 40712 Therapeutic Activity, 87468 Self Care/Home Management, 26024 Electrical Stim unattended /  ([facility]), 74537 Electrical Stim attended,

## 2025-07-27 DIAGNOSIS — K21.9 GASTRO-ESOPHAGEAL REFLUX DISEASE WITHOUT ESOPHAGITIS: ICD-10-CM

## 2025-07-27 RX ORDER — LANSOPRAZOLE 30 MG/1
CAPSULE, DELAYED RELEASE ORAL
Qty: 90 CAPSULE | Refills: 1 | Status: SHIPPED | OUTPATIENT
Start: 2025-07-27

## 2025-07-29 ENCOUNTER — HOSPITAL ENCOUNTER (OUTPATIENT)
Facility: HOSPITAL | Age: 85
Setting detail: RECURRING SERIES
Discharge: HOME | End: 2025-08-01
Attending: ORTHOPAEDIC SURGERY
Payer: MEDICARE

## 2025-07-29 PROCEDURE — 97110 THERAPEUTIC EXERCISES: CPT

## 2025-07-29 NOTE — PROGRESS NOTES
PHYSICAL THERAPY - MEDICARE DAILY TREATMENT NOTE (updated 3/23)      Date: 2025          Patient Name:  Jeffrey Driver :  1940   Medical   Diagnosis:  Right hip pain [M25.551]  Back pain [M54.9] Treatment Diagnosis:  M25.551  RIGHT HIP PAIN  and M54.59, G89.29  CHRONIC LOWER BACK PAIN    Referral Source:  Rodrick Tellez MD Insurance:   Payor: MEDICARE / Plan: MEDICARE PART A AND B / Product Type: *No Product type* /                     Patient  verified yes     Visit #   Current  / Total 2 24   Time   In / Out 10:00 A 10:50 A   Total Treatment Time 50   Total Timed Codes 40   1:1 Treatment Time 30      [facility] Totals Reminder:  bill using total billable   min of TIMED therapeutic procedures and modalities.   8-22 min = 1 unit; 23-37 min = 2 units; 38-52 min = 3 units; 53-67 min = 4 units; 68-82 min = 5 units            SUBJECTIVE  If an interpreting service was utilized for treatment of this patient, the contents of this document represent the material reviewed with the patient via the .     Pain Level (0-10 scale): not captured    Any medication changes, allergies to medications, adverse drug reactions, diagnosis change, or new procedure performed?: [x] No    [] Yes (see summary sheet for update)  Medications: Verified on Patient Summary List    Subjective functional status/changes:     Reports good compliance with HEP, exercising    OBJECTIVE      Therapeutic Procedures:  Tx Min Billable or 1:1 Min (if diff from Tx Min) Procedure, Rationale, Specifics   40 30 61516 Therapeutic Exercise (timed):  increase ROM, strength, coordination, balance, and proprioception to improve patient's ability to progress to PLOF and address remaining functional goals. (see flow sheet as applicable)     Details if applicable:          40 30    Total Total         Modalities Rationale:     decrease inflammation and decrease pain to improve patient's ability to progress to PLOF and address remaining functional

## 2025-07-31 ENCOUNTER — HOSPITAL ENCOUNTER (OUTPATIENT)
Facility: HOSPITAL | Age: 85
Setting detail: RECURRING SERIES
End: 2025-07-31
Attending: ORTHOPAEDIC SURGERY
Payer: MEDICARE

## 2025-07-31 PROCEDURE — 97110 THERAPEUTIC EXERCISES: CPT

## 2025-07-31 NOTE — PROGRESS NOTES
PHYSICAL THERAPY - MEDICARE DAILY TREATMENT NOTE (updated 3/23)      Date: 2025          Patient Name:  Jeffrey Driver :  1940   Medical   Diagnosis:  Right hip pain [M25.551]  Back pain [M54.9] Treatment Diagnosis:  M25.551  RIGHT HIP PAIN  and M54.59, G89.29  CHRONIC LOWER BACK PAIN    Referral Source:  Rodrcik Tellez MD Insurance:   Payor: MEDICARE / Plan: MEDICARE PART A AND B / Product Type: *No Product type* /                     Patient  verified yes     Visit #   Current  / Total 3 24   Time   In / Out 10:00 A 10:50 A   Total Treatment Time 50   Total Timed Codes 40   1:1 Treatment Time 30      [facility] Totals Reminder:  bill using total billable   min of TIMED therapeutic procedures and modalities.   8-22 min = 1 unit; 23-37 min = 2 units; 38-52 min = 3 units; 53-67 min = 4 units; 68-82 min = 5 units            SUBJECTIVE  If an interpreting service was utilized for treatment of this patient, the contents of this document represent the material reviewed with the patient via the .     Pain Level (0-10 scale): not captured    Any medication changes, allergies to medications, adverse drug reactions, diagnosis change, or new procedure performed?: [x] No    [] Yes (see summary sheet for update)  Medications: Verified on Patient Summary List    Subjective functional status/changes:     Pt reports his hips were very sore yesterday, still sore today.    OBJECTIVE      Therapeutic Procedures:  Tx Min Billable or 1:1 Min (if diff from Tx Min) Procedure, Rationale, Specifics   40 30 [id number] Therapeutic Exercise (timed):  increase ROM, strength, coordination, balance, and proprioception to improve patient's ability to progress to PLOF and address remaining functional goals. (see flow sheet as applicable)     Details if applicable:          40 30    Total Total         Modalities Rationale:     decrease inflammation and decrease pain to improve patient's ability to progress to PLOF and address

## 2025-08-03 DIAGNOSIS — E11.9 TYPE 2 DIABETES MELLITUS WITHOUT COMPLICATIONS (HCC): ICD-10-CM

## 2025-08-03 RX ORDER — GLIMEPIRIDE 4 MG/1
4 TABLET ORAL EVERY MORNING
Qty: 90 TABLET | Refills: 1 | Status: SHIPPED | OUTPATIENT
Start: 2025-08-03

## 2025-08-04 RX ORDER — ONDANSETRON 4 MG/1
TABLET, ORALLY DISINTEGRATING ORAL
Qty: 30 TABLET | Refills: 2 | Status: SHIPPED | OUTPATIENT
Start: 2025-08-04

## 2025-08-05 ENCOUNTER — HOSPITAL ENCOUNTER (OUTPATIENT)
Facility: HOSPITAL | Age: 85
Setting detail: RECURRING SERIES
Discharge: HOME | End: 2025-08-08
Attending: ORTHOPAEDIC SURGERY
Payer: MEDICARE

## 2025-08-05 PROCEDURE — 97112 NEUROMUSCULAR REEDUCATION: CPT

## 2025-08-05 PROCEDURE — 97110 THERAPEUTIC EXERCISES: CPT

## 2025-08-07 ENCOUNTER — HOSPITAL ENCOUNTER (OUTPATIENT)
Facility: HOSPITAL | Age: 85
Setting detail: RECURRING SERIES
Discharge: HOME | End: 2025-08-10
Attending: ORTHOPAEDIC SURGERY
Payer: MEDICARE

## 2025-08-07 PROCEDURE — 97110 THERAPEUTIC EXERCISES: CPT

## 2025-08-07 PROCEDURE — 97112 NEUROMUSCULAR REEDUCATION: CPT

## 2025-08-12 ENCOUNTER — HOSPITAL ENCOUNTER (OUTPATIENT)
Facility: HOSPITAL | Age: 85
Setting detail: RECURRING SERIES
Discharge: HOME | End: 2025-08-15
Attending: ORTHOPAEDIC SURGERY
Payer: MEDICARE

## 2025-08-12 PROCEDURE — 97112 NEUROMUSCULAR REEDUCATION: CPT

## 2025-08-12 PROCEDURE — 97110 THERAPEUTIC EXERCISES: CPT

## 2025-08-14 ENCOUNTER — HOSPITAL ENCOUNTER (OUTPATIENT)
Facility: HOSPITAL | Age: 85
Setting detail: RECURRING SERIES
Discharge: HOME | End: 2025-08-17
Attending: ORTHOPAEDIC SURGERY
Payer: MEDICARE

## 2025-08-14 PROCEDURE — 97112 NEUROMUSCULAR REEDUCATION: CPT

## 2025-08-14 PROCEDURE — 97110 THERAPEUTIC EXERCISES: CPT

## 2025-08-19 ENCOUNTER — HOSPITAL ENCOUNTER (OUTPATIENT)
Facility: HOSPITAL | Age: 85
Setting detail: RECURRING SERIES
Discharge: HOME | End: 2025-08-22
Attending: ORTHOPAEDIC SURGERY
Payer: MEDICARE

## 2025-08-19 PROCEDURE — 97110 THERAPEUTIC EXERCISES: CPT

## 2025-08-19 PROCEDURE — 97112 NEUROMUSCULAR REEDUCATION: CPT

## 2025-08-21 ENCOUNTER — HOSPITAL ENCOUNTER (OUTPATIENT)
Facility: HOSPITAL | Age: 85
Setting detail: RECURRING SERIES
Discharge: HOME | End: 2025-08-24
Attending: ORTHOPAEDIC SURGERY
Payer: MEDICARE

## 2025-08-21 PROCEDURE — 97112 NEUROMUSCULAR REEDUCATION: CPT

## 2025-08-21 PROCEDURE — 97110 THERAPEUTIC EXERCISES: CPT

## 2025-08-26 ENCOUNTER — HOSPITAL ENCOUNTER (OUTPATIENT)
Facility: HOSPITAL | Age: 85
Setting detail: RECURRING SERIES
Discharge: HOME | End: 2025-08-29
Attending: ORTHOPAEDIC SURGERY
Payer: MEDICARE

## 2025-08-26 PROCEDURE — 97112 NEUROMUSCULAR REEDUCATION: CPT

## 2025-08-26 PROCEDURE — 97110 THERAPEUTIC EXERCISES: CPT

## 2025-08-28 ENCOUNTER — HOSPITAL ENCOUNTER (OUTPATIENT)
Facility: HOSPITAL | Age: 85
Setting detail: RECURRING SERIES
Discharge: HOME | End: 2025-08-31
Attending: ORTHOPAEDIC SURGERY
Payer: MEDICARE

## 2025-08-28 PROCEDURE — 97110 THERAPEUTIC EXERCISES: CPT

## 2025-08-28 PROCEDURE — 97112 NEUROMUSCULAR REEDUCATION: CPT

## 2025-09-02 ENCOUNTER — HOSPITAL ENCOUNTER (OUTPATIENT)
Facility: HOSPITAL | Age: 85
Setting detail: RECURRING SERIES
Discharge: HOME | End: 2025-09-05
Attending: ORTHOPAEDIC SURGERY
Payer: MEDICARE

## 2025-09-02 PROCEDURE — 97112 NEUROMUSCULAR REEDUCATION: CPT

## 2025-09-02 PROCEDURE — 97110 THERAPEUTIC EXERCISES: CPT

## (undated) DEVICE — GLOVE ORANGE PI 8   MSG9080

## (undated) DEVICE — OPEN-END URETERAL CATHETER: Brand: COOK

## (undated) DEVICE — Device

## (undated) DEVICE — GARMENT,MEDLINE,DVT,INT,CALF,MED, GEN2: Brand: MEDLINE

## (undated) DEVICE — SOL IRR SOD CHL 0.9% TITAN XL CNTNR 3000ML

## (undated) DEVICE — TUR SERIES SET

## (undated) DEVICE — SOLUTION IRRIG 1000ML H2O PIC PLAS SHATTERPROOF CONTAINER

## (undated) DEVICE — SYRINGE MED 10ML LUERLOCK TIP W/O SFTY DISP

## (undated) DEVICE — CYSTO-SMH: Brand: MEDLINE INDUSTRIES, INC.

## (undated) DEVICE — GUIDEWIRE ENDOSCP L150CM DIA0.035IN TIP 3CM PTFE NIT

## (undated) DEVICE — GOWN,SIRUS,POLYRNF,BRTHSLV,XL,30/CS: Brand: MEDLINE

## (undated) DEVICE — SOLUTION IRRIGATION STRL H2O 1000 ML UROMATIC CONTAINER